# Patient Record
Sex: FEMALE | Race: BLACK OR AFRICAN AMERICAN | Employment: UNEMPLOYED | URBAN - METROPOLITAN AREA
[De-identification: names, ages, dates, MRNs, and addresses within clinical notes are randomized per-mention and may not be internally consistent; named-entity substitution may affect disease eponyms.]

---

## 2017-01-15 ENCOUNTER — HOSPITAL ENCOUNTER (EMERGENCY)
Age: 22
Discharge: HOME | End: 2017-01-15
Attending: EMERGENCY MEDICINE
Payer: COMMERCIAL

## 2017-01-15 VITALS
TEMPERATURE: 98 F | BODY MASS INDEX: 26.37 KG/M2 | DIASTOLIC BLOOD PRESSURE: 70 MMHG | OXYGEN SATURATION: 99 % | WEIGHT: 168 LBS | SYSTOLIC BLOOD PRESSURE: 132 MMHG | RESPIRATION RATE: 18 BRPM | HEIGHT: 67 IN | HEART RATE: 74 BPM

## 2017-01-15 DIAGNOSIS — Z34.90 EARLY STAGE OF PREGNANCY: Primary | ICD-10-CM

## 2017-01-15 LAB
ALBUMIN SERPL BCP-MCNC: 3.8 G/DL (ref 3.5–5)
ALBUMIN/GLOB SERPL: 1 {RATIO} (ref 1.2–3.5)
ALP SERPL-CCNC: 45 U/L (ref 50–136)
ALT SERPL-CCNC: 23 U/L (ref 12–65)
ANION GAP BLD CALC-SCNC: 11 MMOL/L (ref 7–16)
AST SERPL W P-5'-P-CCNC: 28 U/L (ref 15–37)
BASOPHILS # BLD AUTO: 0 K/UL (ref 0–0.2)
BASOPHILS # BLD: 1 % (ref 0–2)
BILIRUB SERPL-MCNC: 0.5 MG/DL (ref 0.2–1.1)
BUN SERPL-MCNC: 9 MG/DL (ref 6–23)
CALCIUM SERPL-MCNC: 8.9 MG/DL (ref 8.3–10.4)
CHLORIDE SERPL-SCNC: 105 MMOL/L (ref 98–107)
CO2 SERPL-SCNC: 23 MMOL/L (ref 21–32)
CREAT SERPL-MCNC: 0.76 MG/DL (ref 0.6–1)
DIFFERENTIAL METHOD BLD: ABNORMAL
EOSINOPHIL # BLD: 0.1 K/UL (ref 0–0.8)
EOSINOPHIL NFR BLD: 1 % (ref 0.5–7.8)
ERYTHROCYTE [DISTWIDTH] IN BLOOD BY AUTOMATED COUNT: 13.2 % (ref 11.9–14.6)
GLOBULIN SER CALC-MCNC: 3.7 G/DL (ref 2.3–3.5)
GLUCOSE SERPL-MCNC: 75 MG/DL (ref 65–100)
HCG SERPL-ACNC: ABNORMAL MIU/ML (ref 0–6)
HCG UR QL: POSITIVE
HCT VFR BLD AUTO: 33.4 % (ref 35.8–46.3)
HGB BLD-MCNC: 11.3 G/DL (ref 11.7–15.4)
IMM GRANULOCYTES # BLD: 0 K/UL (ref 0–0.5)
IMM GRANULOCYTES NFR BLD AUTO: 0 % (ref 0–5)
LYMPHOCYTES # BLD AUTO: 66 % (ref 13–44)
LYMPHOCYTES # BLD: 2.7 K/UL (ref 0.5–4.6)
MAGNESIUM SERPL-MCNC: 2.3 MG/DL (ref 1.8–2.4)
MCH RBC QN AUTO: 29.4 PG (ref 26.1–32.9)
MCHC RBC AUTO-ENTMCNC: 33.8 G/DL (ref 31.4–35)
MCV RBC AUTO: 87 FL (ref 79.6–97.8)
MONOCYTES # BLD: 0.7 K/UL (ref 0.1–1.3)
MONOCYTES NFR BLD AUTO: 18 % (ref 4–12)
NEUTS SEG # BLD: 0.6 K/UL (ref 1.7–8.2)
NEUTS SEG NFR BLD AUTO: 14 % (ref 43–78)
PLATELET # BLD AUTO: 181 K/UL (ref 150–450)
PMV BLD AUTO: 10 FL (ref 10.8–14.1)
POTASSIUM SERPL-SCNC: 3.6 MMOL/L (ref 3.5–5.1)
PROT SERPL-MCNC: 7.5 G/DL (ref 6.3–8.2)
RBC # BLD AUTO: 3.84 M/UL (ref 4.05–5.25)
SODIUM SERPL-SCNC: 139 MMOL/L (ref 136–145)
WBC # BLD AUTO: 4.1 K/UL (ref 4.3–11.1)

## 2017-01-15 PROCEDURE — 81003 URINALYSIS AUTO W/O SCOPE: CPT | Performed by: EMERGENCY MEDICINE

## 2017-01-15 PROCEDURE — 99284 EMERGENCY DEPT VISIT MOD MDM: CPT | Performed by: EMERGENCY MEDICINE

## 2017-01-15 PROCEDURE — 84702 CHORIONIC GONADOTROPIN TEST: CPT | Performed by: EMERGENCY MEDICINE

## 2017-01-15 PROCEDURE — 83735 ASSAY OF MAGNESIUM: CPT | Performed by: EMERGENCY MEDICINE

## 2017-01-15 PROCEDURE — 81025 URINE PREGNANCY TEST: CPT

## 2017-01-15 PROCEDURE — 85025 COMPLETE CBC W/AUTO DIFF WBC: CPT | Performed by: EMERGENCY MEDICINE

## 2017-01-15 PROCEDURE — 80053 COMPREHEN METABOLIC PANEL: CPT | Performed by: EMERGENCY MEDICINE

## 2017-01-15 NOTE — ED PROVIDER NOTES
HPI Comments: Pt reports to the ED reporting increasing fatigue and dizziness for the past couple of days. Denies any syncope. Reports some intermittent nausea. Recently completed amoxicillin for UTI. Denies fevers, vomiting, chest pain or dyspnea. Patient is a 19 y.o. female presenting with fatigue. The history is provided by the patient. Fatigue   This is a recurrent problem. The current episode started more than 1 week ago. The problem has not changed since onset. There was no focality noted. Pertinent negatives include no focal weakness, no slurred speech, no speech difficulty, no movement disorder, no agitation and no unresponsiveness. There has been no fever. Pertinent negatives include no shortness of breath, no vomiting, no confusion, no headaches and no nausea. Past Medical History:   Diagnosis Date    Anemia     Hypertension      gestational BP    Missed ab 2/10/15    Preeclampsia     Second trimester bleeding 8/1/2014    Subchorionic bleed 8/2/2014       Past Surgical History:   Procedure Laterality Date    Hx other surgical       D&C 2015    Hx gyn           Family History:   Problem Relation Age of Onset    Asthma Father     Diabetes Father     Bleeding Prob Brother      Brain    Diabetes Brother     Other Paternal Grandmother      Lung cancer       Social History     Social History    Marital status: SINGLE     Spouse name: N/A    Number of children: N/A    Years of education: N/A     Occupational History    Not on file. Social History Main Topics    Smoking status: Never Smoker    Smokeless tobacco: Never Used    Alcohol use No    Drug use: No    Sexual activity: Yes     Partners: Male     Other Topics Concern    Not on file     Social History Narrative         ALLERGIES: Bactrim [sulfamethoprim ds]    Review of Systems   Constitutional: Positive for fatigue. Negative for chills and diaphoresis.    HENT: Negative for congestion, dental problem, trouble swallowing and voice change. Eyes: Negative for photophobia and visual disturbance. Respiratory: Negative for cough, chest tightness and shortness of breath. Cardiovascular: Negative for palpitations and leg swelling. Gastrointestinal: Negative for abdominal pain, nausea and vomiting. Endocrine: Negative for polydipsia, polyphagia and polyuria. Genitourinary: Negative for dysuria, flank pain and urgency. Musculoskeletal: Negative for back pain and gait problem. Allergic/Immunologic: Negative for food allergies and immunocompromised state. Neurological: Negative for tremors, focal weakness, speech difficulty, weakness, numbness and headaches. Hematological: Does not bruise/bleed easily. Psychiatric/Behavioral: Negative for agitation and confusion. All other systems reviewed and are negative. Vitals:    01/15/17 1511   BP: 126/62   Pulse: 78   Resp: 16   Temp: 98.2 °F (36.8 °C)   SpO2: 100%   Weight: 76.2 kg (168 lb)   Height: 5' 7\" (1.702 m)            Physical Exam   Constitutional: She is oriented to person, place, and time. She appears well-developed and well-nourished. No distress. HENT:   Head: Normocephalic and atraumatic. Mouth/Throat: Oropharynx is clear and moist.   Eyes: Conjunctivae and EOM are normal. Pupils are equal, round, and reactive to light. No scleral icterus. Neck: Normal range of motion. Neck supple. No JVD present. No tracheal deviation present. Cardiovascular: Normal rate, regular rhythm, normal heart sounds and intact distal pulses. No murmur heard. Pulmonary/Chest: Effort normal and breath sounds normal. No respiratory distress. She has no wheezes. Abdominal: Soft. Bowel sounds are normal. She exhibits no distension. Musculoskeletal: Normal range of motion. She exhibits no edema or deformity. Neurological: She is alert and oriented to person, place, and time. She has normal reflexes. No cranial nerve deficit.  Coordination normal.   Skin: Skin is warm and dry. No rash noted. She is not diaphoretic. Nursing note and vitals reviewed. MDM  Number of Diagnoses or Management Options  Diagnosis management comments: DDx: Anemia/ UTI/ Electrolyte abnormality/ Pregnancy    5:05 PM  UPT positive, will check HCG quant    6:02 PM  HCG 20K  Bedside US showed an IUP  Labs stable, normal urinalysis, will d/c with early pregnancy precautions. Will ob f/u       Amount and/or Complexity of Data Reviewed  Clinical lab tests: ordered and reviewed    Risk of Complications, Morbidity, and/or Mortality  Presenting problems: moderate  Diagnostic procedures: low  Management options: moderate    Patient Progress  Patient progress: stable    ED Course       Bedside US  Date/Time: 1/15/2017 6:02 PM  Consent: Verbal consent obtained.   Consent given by: patient  Procedure Type: Abdominal and pelvis - unknown pregnancy  Indication: pregnancy  Images Obtained: Transabdominal  Findings: Positive IUP with fetal heart rate  Fetal Heart Rate: 120 bpm  Comments: Visualized early IUP, CRL 6w5d

## 2017-01-15 NOTE — DISCHARGE INSTRUCTIONS
Learning About Pregnancy  Your Care Instructions  Your health in the early weeks of your pregnancy is particularly important for your babys health. Take good care of yourself. Anything you do that harms your body can also harm your baby. Make sure to go to all of your doctor appointments. Regular checkups will help keep you and your baby healthy. Follow-up care is a key part of your treatment and safety. Be sure to make and go to all appointments, and call your doctor if you are having problems. Its also a good idea to know your test results and keep a list of the medicines you take. How can you care for yourself at home? Diet  · Eat a balanced diet. Make sure your diet includes plenty of beans, peas, and leafy green vegetables. · Do not skip meals or go for many hours without eating. If you are nauseated, try to eat a small, healthy snack every 2 to 3 hours. · Do not eat fish that has a high level of mercury, such as shark, swordfish, or mackerel. Do not eat more than one can of tuna each week. · Drink plenty of fluids, enough so that your urine is light yellow or clear like water. If you have kidney, heart, or liver disease and have to limit fluids, talk with your doctor before you increase the amount of fluids you drink. · Cut down on caffeine, such as coffee, tea, and cola. · Do not drink alcohol, such as beer, wine, or hard liquor. · Take a multivitamin that contains at least 400 micrograms (mcg) of folic acid to help prevent birth defects. Fortified cereal and whole wheat bread are good additional sources of folic acid. · Increase the calcium in your diet. Try to drink a quart of skim milk each day. You may also take calcium supplements and choose foods such as cheese and yogurt. Lifestyle  · Make sure you go to your follow-up appointments. · Get plenty of rest. You may be unusually tired while you are pregnant. · Get at least 30 minutes of exercise on most days of the week.  Walking is a good choice. If you have not exercised in the past, start out slowly. Take several short walks each day. · Do not smoke. If you need help quitting, talk to your doctor about stop-smoking programs. These can increase your chances of quitting for good. · Do not touch cat feces or litter boxes. Also, wash your hands after you handle raw meat, and fully cook all meat before you eat it. Wear gloves when you work in the yard or garden, and wash your hands well when you are done. Cat feces, raw or undercooked meat, and contaminated dirt can cause an infection that may harm your baby or lead to a miscarriage. · Do not use saunas or hot tubs. Raising your body temperature may harm your baby. · Avoid chemical fumes, paint fumes, or poisons. · Do not use illegal drugs or alcohol. Medicines  · Review all of your medicines with your doctor. Some of your routine medicines may need to be changed to protect your baby. · Use acetaminophen (Tylenol) to relieve minor problems, such as a mild headache or backache or a mild fever with cold symptoms. Do not use nonsteroidal anti-inflammatory drugs (NSAIDs), such as ibuprofen (Advil, Motrin) or naproxen (Aleve), unless your doctor says it is okay. · Do not take two or more pain medicines at the same time unless the doctor told you to. Many pain medicines have acetaminophen, which is Tylenol. Too much acetaminophen (Tylenol) can be harmful. · Take your medicines exactly as prescribed. Call your doctor if you think you are having a problem with your medicine. To manage morning sickness  · If you feel sick when you first wake up, try eating a small snack (such as crackers) before you get out of bed. Allow some time to digest the snack, and then get out of bed slowly. · Do not skip meals or go for long periods without eating. An empty stomach can make nausea worse. · Eat small, frequent meals instead of three large meals each day. · Drink plenty of fluids.  Sports drinks, such as Gatorade or Powerade, are good choices. · Eat foods that are high in protein but low in fat. · If you are taking iron supplements, ask your doctor if they are necessary. Iron can make nausea worse. · Avoid any smells, such as coffee, that make you feel sick. · Get lots of rest. Morning sickness may be worse when you are tired. Where can you learn more? Go to http://gini-analilia.info/. Enter G656 in the search box to learn more about \"Learning About Pregnancy. \"  Current as of: May 30, 2016  Content Version: 11.1  © 3592-8630 [employer], [employer]. Care instructions adapted under license by [employer] (which disclaims liability or warranty for this information). If you have questions about a medical condition or this instruction, always ask your healthcare professional. [address] any warranty or liability for your use of this information.

## 2017-01-15 NOTE — ED TRIAGE NOTES
C/o feeling dizzy on/off x 1week . Pt states no movement needed for dizziness to begin. . Denies any recent illness, cough. cold,n,v,d.

## 2017-02-27 PROBLEM — A59.01 TRICHOMONAL VAGINITIS DURING PREGNANCY IN FIRST TRIMESTER: Status: ACTIVE | Noted: 2017-02-27

## 2017-02-27 PROBLEM — O23.591 TRICHOMONAL VAGINITIS DURING PREGNANCY IN FIRST TRIMESTER: Status: ACTIVE | Noted: 2017-02-27

## 2017-02-27 PROCEDURE — 88142 CYTOPATH C/V THIN LAYER: CPT | Performed by: OBSTETRICS & GYNECOLOGY

## 2017-03-01 ENCOUNTER — HOSPITAL ENCOUNTER (OUTPATIENT)
Dept: LAB | Age: 22
Discharge: HOME | End: 2017-03-01

## 2017-03-02 PROBLEM — O09.299 HIGH RISK PREGNANCY DUE TO HISTORY OF PREVIOUS OBSTETRICAL PROBLEM: Status: ACTIVE | Noted: 2017-02-27

## 2017-03-24 ENCOUNTER — HOSPITAL ENCOUNTER (EMERGENCY)
Age: 22
Discharge: HOME | End: 2017-03-24
Attending: OBSTETRICS & GYNECOLOGY | Admitting: OBSTETRICS & GYNECOLOGY
Payer: COMMERCIAL

## 2017-03-24 PROCEDURE — 76815 OB US LIMITED FETUS(S): CPT

## 2017-03-24 PROCEDURE — 99281 EMR DPT VST MAYX REQ PHY/QHP: CPT

## 2017-03-24 NOTE — PROGRESS NOTES
Support given to patient in 16 week fetal demise. I have been with patient on the last 3 demises. Prayer and support given. Will continue to follow as needed. Jane Gallo M.Div.

## 2017-03-24 NOTE — PROGRESS NOTES
Pt arrived to L&D triage for decreased FHT, no HT found on doppler. Pt request spiritual care to come before Dr. Marley Hunt. Ino To called.

## 2017-03-24 NOTE — IP AVS SNAPSHOT
Current Discharge Medication List  
  
ASK your doctor about these medications Dose & Instructions Dispensing Information Comments Morning Noon Evening Bedtime  
 prenatal vit-calcium-iron-fa 29 mg iron- 1 mg Tab tablet Commonly known as:  PRENATAL PLUS Your last dose was: Your next dose is:    
   
   
 Dose:  1 Tab Take 1 Tab by mouth daily. Quantity:  30 Tab Refills:  3

## 2017-03-24 NOTE — PROGRESS NOTES
Discharge instuctions given. States understanding. Ambulate to personal auto with family at side x 2.

## 2017-03-24 NOTE — DISCHARGE INSTRUCTIONS
Go to Dr Jaqui Vance office at 0830 AM for appointment and will schedule a D&C. Sepsis: Care Instructions  Your Care Instructions  Sepsis is an infection that has spread throughout your body. It is a life-threatening condition and often causes extremely low blood pressure. This can lead to problems with many different organs. The cause of sepsis is not always clear, but it can happen as part of a long-term or sudden illness. Sometimes even a mild illness can lead to sepsis. Follow-up care is a key part of your treatment and safety. Be sure to make and go to all appointments, and call your doctor if you are having problems. Its also a good idea to know your test results and keep a list of the medicines you take. How can you care for yourself at home? · If your doctor prescribed antibiotics, take them as directed. Do not stop taking them just because you feel better. You need to take the full course of antibiotics. · Drink plenty of fluids, enough so that your urine is light yellow or clear like water. Choose water or caffeine-free clear liquids until you feel better. If you have kidney, heart, or liver disease and have to limit fluids, talk with your doctor before you increase your fluid intake. You can try rehydration drinks, such as Gatorade or Powerade. · Do not drink alcohol. · Eat a healthy diet. Include fruits, vegetables, and whole grains in your diet every day. · Walking is an easy way to get exercise. Gradually increase the amount you walk every day. Make sure your doctor knows that you are starting an exercise program.  · Do not smoke or use other tobacco products. If you need help quitting, talk to your doctor about stop-smoking programs and medicines. These can increase your chances of quitting for good. When should you call for help? Call 911 anytime you think you may need emergency care. For example, call if:  · You passed out (lost consciousness).   Call your doctor now or seek immediate medical care if:  · You have a fever or chills. · You have cool, pale, or clammy skin. · You are dizzy or lightheaded, or you feel like you may faint. · You have any new symptoms, such as a cough, pain in one part of your body, or urinary problems. Watch closely for changes in your health, and be sure to contact your doctor if:  · You do not get better as expected. Where can you learn more? Go to http://alyssa.info/.

## 2017-03-24 NOTE — ED PROVIDER NOTES
Chief Complaint:      19 y.o. female  at 16w0d  weeks gestation who is seen for fetal demise. Pt has a h/o fetal loss at 19 weeks EGA and 3 early miscarriages. Pt denies any pain or bleeding. She had a sono earlier in the pregnancy at 11w4d. Mitesh Reddy HISTORY:    History   Sexual Activity    Sexual activity: Yes    Partners: Male     Patient's last menstrual period was 2016 (approximate). Social History     Social History    Marital status: SINGLE     Spouse name: N/A    Number of children: N/A    Years of education: N/A     Occupational History    Not on file. Social History Main Topics    Smoking status: Current Every Day Smoker     Packs/day: 0.25     Years: 0.50    Smokeless tobacco: Never Used    Alcohol use No    Drug use: No    Sexual activity: Yes     Partners: Male     Other Topics Concern    Not on file     Social History Narrative       Past Surgical History:   Procedure Laterality Date    HX GYN      [facility] OTHER SURGICAL      D&C     HX OTHER SURGICAL      D&C        Past Medical History:   Diagnosis Date    Anemia     Hypertension     gestational BP    Missed ab 2/10/15    Preeclampsia     Second trimester bleeding 2014    Subchorionic bleed 2014         ROS:  A 12 point review of symptoms negative except for chief complaint as described above. PHYSICAL EXAM:  Last menstrual period 2016, unknown if currently breastfeeding. The patient appears well, alert, oriented x 3. Lungs are clear. Heart RRR, no murmurs. Abdomen soft, nontender, no guarding  No cva tenderness  No fundal tenderness  Upper ext: no edema, reflexes +2  Lower ext: no edema, neg karthik's, reflexes +2  Skin: no rashes or lesions  Mood/ Affect: appropriate  SVE: Long/closed  FHT: On abdominal ultrasound - no FCA; CRL c/w 12w4d  TOCO:      Assessment/Plan:  First trimester loss; pt asymptomatic currently. Pt d/w Dr. Tabatha Contreras.  He requests that pt f/u with Dr. Jayden Ferguson on Monday 3/27/17. Ab precautions given.

## 2017-03-24 NOTE — IP AVS SNAPSHOT
Kayce Petties 
 
 
 [address] [place]  
[phone] Patient: Hosea Painting MRN: [id number] :1995 You are allergic to the following Allergen Reactions Bactrim (Sulfamethoprim Ds) Nausea and Vomiting Recent Documentation OB Status Smoking Status Pregnant Current Every Day Smoker Emergency Contacts Name Discharge Info Relation Home Work Mobile Sinai Romero DISCHARGE CAREGIVER [3] Mother [14] [phone] About your hospitalization You were admitted on:  N/A You [place] in [place]  [facility] [place] [facility] You were discharged on:  2017 Unit phone number:  [phone] Why you were hospitalized Your primary diagnosis was:  Not on File Providers Seen During Your Hospitalizations Provider Role Specialty Primary office phone Mary Carmen Doe MD Attending Provider Obstetrics & Gynecology [phone] Your Primary Care Physician (PCP) Primary Care Physician Office Phone Office Fax Ta Gross [phone] [address] Follow-up Information Follow up With Details Comments Contact Info Mary Carmen Doe MD Go in 1 day at 0830 AM on 2017 [address]  [address] 
[phone] Your Appointments 2017  8:30 AM EDT Follow Up with Mary Carmen Doe MD  
[facility] [address]  [address]  
[phone] Current Discharge Medication List  
  
ASK your doctor about these medications Dose & Instructions Dispensing Information Comments Morning Noon Evening Bedtime  
 prenatal vit-calcium-iron-fa 29 mg iron- 1 mg Tab tablet Commonly known as:  PRENATAL PLUS Your last dose was: Your next dose is:    
   
   
 Dose:  1 Tab Take 1 Tab by mouth daily. Quantity:  30 Tab Refills:  3 Discharge Instructions Go to Dr Agata Jean Baptiste office at 0830 AM for appointment and will schedule a D&C. Sepsis: Care Instructions Your Care Instructions Sepsis is an infection that has spread throughout your body. It is a life-threatening condition and often causes extremely low blood pressure. This can lead to problems with many different organs. The cause of sepsis is not always clear, but it can happen as part of a long-term or sudden illness. Sometimes even a mild illness can lead to sepsis. Follow-up care is a key part of your treatment and safety. Be sure to make and go to all appointments, and call your doctor if you are having problems. Its also a good idea to know your test results and keep a list of the medicines you take. How can you care for yourself at home? · If your doctor prescribed antibiotics, take them as directed. Do not stop taking them just because you feel better. You need to take the full course of antibiotics. · Drink plenty of fluids, enough so that your urine is light yellow or clear like water. Choose water or caffeine-free clear liquids until you feel better. If you have kidney, heart, or liver disease and have to limit fluids, talk with your doctor before you increase your fluid intake. You can try rehydration drinks, such as Gatorade or Powerade. · Do not drink alcohol. · Eat a healthy diet. Include fruits, vegetables, and whole grains in your diet every day. · Walking is an easy way to get exercise. Gradually increase the amount you walk every day. Make sure your doctor knows that you are starting an exercise program. 
· Do not smoke or use other tobacco products. If you need help quitting, talk to your doctor about stop-smoking programs and medicines. These can increase your chances of quitting for good. When should you call for help? Call 911 anytime you think you may need emergency care. For example, call if: 
· You passed out (lost consciousness). Call your doctor now or seek immediate medical care if: 
· You have a fever or chills. · You have cool, pale, or clammy skin. · You are dizzy or lightheaded, or you feel like you may faint. · You have any new symptoms, such as a cough, pain in one part of your body, or urinary problems. Watch closely for changes in your health, and be sure to contact your doctor if: 
· You do not get better as expected. Where can you learn more? Go to http://gini-analilia.info/. Discharge Orders None Introducing [facility]! Dear Bruce Davis: Thank you for requesting a [employer] account. Our records indicate that you already have an active [employer] account. You can access your account anytime at [email or web address] Did you know that you can access your hospital and ER discharge instructions at any time in [employer]? You can also review all of your test results from your hospital stay or ER visit. Additional Information If you have questions, please visit the Frequently Asked Questions section of the [employer] website at [email or web address] Remember, [facility] is NOT to be used for urgent needs. For medical emergencies, dial 911. Now available from your iPhone and Android! General Information Please provide this summary of care documentation to your next provider. Patient Signature:  ____________________________________________________________ Date:  ____________________________________________________________  
  
Bryan Thakkar Provider Signature:  ____________________________________________________________ Date:  ____________________________________________________________

## 2017-03-27 ENCOUNTER — ANESTHESIA EVENT (OUTPATIENT)
Dept: SURGERY | Age: 22
End: 2017-03-27
Payer: COMMERCIAL

## 2017-03-27 PROBLEM — O02.1 MISSED AB: Status: ACTIVE | Noted: 2017-03-27

## 2017-03-27 RX ORDER — MIDAZOLAM HYDROCHLORIDE 1 MG/ML
2 INJECTION, SOLUTION INTRAMUSCULAR; INTRAVENOUS ONCE
Status: CANCELLED | OUTPATIENT
Start: 2017-03-27 | End: 2017-03-27

## 2017-03-27 RX ORDER — CELECOXIB 200 MG/1
200 CAPSULE ORAL
Status: CANCELLED | OUTPATIENT
Start: 2017-03-27

## 2017-03-28 ENCOUNTER — ANESTHESIA (OUTPATIENT)
Dept: SURGERY | Age: 22
End: 2017-03-28
Payer: COMMERCIAL

## 2017-03-28 ENCOUNTER — SURGERY (OUTPATIENT)
Age: 22
End: 2017-03-28

## 2017-03-28 ENCOUNTER — HOSPITAL ENCOUNTER (OUTPATIENT)
Age: 22
Setting detail: OUTPATIENT SURGERY
Discharge: HOME | End: 2017-03-28
Attending: OBSTETRICS & GYNECOLOGY | Admitting: OBSTETRICS & GYNECOLOGY
Payer: COMMERCIAL

## 2017-03-28 VITALS
DIASTOLIC BLOOD PRESSURE: 58 MMHG | RESPIRATION RATE: 16 BRPM | TEMPERATURE: 97.8 F | HEIGHT: 67 IN | OXYGEN SATURATION: 100 % | BODY MASS INDEX: 27 KG/M2 | HEART RATE: 65 BPM | SYSTOLIC BLOOD PRESSURE: 115 MMHG | WEIGHT: 172 LBS

## 2017-03-28 PROCEDURE — 76010000138 HC OR TIME 0.5 TO 1 HR: Performed by: OBSTETRICS & GYNECOLOGY

## 2017-03-28 PROCEDURE — 77030020143 HC AIRWY LARYN INTUB CGAS -A: Performed by: ANESTHESIOLOGY

## 2017-03-28 PROCEDURE — 77030008579 HC TBNG UTER SUC CARD -A: Performed by: OBSTETRICS & GYNECOLOGY

## 2017-03-28 PROCEDURE — 77030020782 HC GWN BAIR PAWS FLX 3M -B: Performed by: ANESTHESIOLOGY

## 2017-03-28 PROCEDURE — 88305 TISSUE EXAM BY PATHOLOGIST: CPT | Performed by: OBSTETRICS & GYNECOLOGY

## 2017-03-28 PROCEDURE — 74011250636 HC RX REV CODE- 250/636

## 2017-03-28 PROCEDURE — 76210000016 HC OR PH I REC 1 TO 1.5 HR: Performed by: OBSTETRICS & GYNECOLOGY

## 2017-03-28 PROCEDURE — 74011000250 HC RX REV CODE- 250

## 2017-03-28 PROCEDURE — 77030018836 HC SOL IRR NACL ICUM -A: Performed by: OBSTETRICS & GYNECOLOGY

## 2017-03-28 PROCEDURE — 74011000250 HC RX REV CODE- 250: Performed by: ANESTHESIOLOGY

## 2017-03-28 PROCEDURE — 76060000032 HC ANESTHESIA 0.5 TO 1 HR: Performed by: OBSTETRICS & GYNECOLOGY

## 2017-03-28 PROCEDURE — 77030011640 HC PAD GRND REM COVD -A: Performed by: OBSTETRICS & GYNECOLOGY

## 2017-03-28 PROCEDURE — 77030009368: Performed by: OBSTETRICS & GYNECOLOGY

## 2017-03-28 PROCEDURE — 74011250636 HC RX REV CODE- 250/636: Performed by: ANESTHESIOLOGY

## 2017-03-28 PROCEDURE — 74011250637 HC RX REV CODE- 250/637: Performed by: ANESTHESIOLOGY

## 2017-03-28 PROCEDURE — 76210000021 HC REC RM PH II 0.5 TO 1 HR: Performed by: OBSTETRICS & GYNECOLOGY

## 2017-03-28 RX ORDER — OXYCODONE HYDROCHLORIDE 5 MG/1
5 TABLET ORAL
Status: DISCONTINUED | OUTPATIENT
Start: 2017-03-28 | End: 2017-03-28 | Stop reason: HOSPADM

## 2017-03-28 RX ORDER — LIDOCAINE HYDROCHLORIDE 10 MG/ML
0.1 INJECTION INFILTRATION; PERINEURAL AS NEEDED
Status: DISCONTINUED | OUTPATIENT
Start: 2017-03-28 | End: 2017-03-28 | Stop reason: HOSPADM

## 2017-03-28 RX ORDER — ALBUTEROL SULFATE 0.83 MG/ML
2.5 SOLUTION RESPIRATORY (INHALATION) AS NEEDED
Status: DISCONTINUED | OUTPATIENT
Start: 2017-03-28 | End: 2017-03-28 | Stop reason: HOSPADM

## 2017-03-28 RX ORDER — LIDOCAINE HYDROCHLORIDE 20 MG/ML
INJECTION, SOLUTION EPIDURAL; INFILTRATION; INTRACAUDAL; PERINEURAL AS NEEDED
Status: DISCONTINUED | OUTPATIENT
Start: 2017-03-28 | End: 2017-03-28 | Stop reason: HOSPADM

## 2017-03-28 RX ORDER — DEXAMETHASONE SODIUM PHOSPHATE 4 MG/ML
INJECTION, SOLUTION INTRA-ARTICULAR; INTRALESIONAL; INTRAMUSCULAR; INTRAVENOUS; SOFT TISSUE AS NEEDED
Status: DISCONTINUED | OUTPATIENT
Start: 2017-03-28 | End: 2017-03-28 | Stop reason: HOSPADM

## 2017-03-28 RX ORDER — ONDANSETRON 2 MG/ML
INJECTION INTRAMUSCULAR; INTRAVENOUS AS NEEDED
Status: DISCONTINUED | OUTPATIENT
Start: 2017-03-28 | End: 2017-03-28 | Stop reason: HOSPADM

## 2017-03-28 RX ORDER — FENTANYL CITRATE 50 UG/ML
INJECTION, SOLUTION INTRAMUSCULAR; INTRAVENOUS AS NEEDED
Status: DISCONTINUED | OUTPATIENT
Start: 2017-03-28 | End: 2017-03-28 | Stop reason: HOSPADM

## 2017-03-28 RX ORDER — SODIUM CHLORIDE 0.9 % (FLUSH) 0.9 %
5-10 SYRINGE (ML) INJECTION AS NEEDED
Status: DISCONTINUED | OUTPATIENT
Start: 2017-03-28 | End: 2017-03-28 | Stop reason: HOSPADM

## 2017-03-28 RX ORDER — OXYCODONE HYDROCHLORIDE 5 MG/1
5 TABLET ORAL
Qty: 20 TAB | Refills: 0 | Status: SHIPPED | OUTPATIENT
Start: 2017-03-28 | End: 2018-09-06

## 2017-03-28 RX ORDER — FENTANYL CITRATE 50 UG/ML
100 INJECTION, SOLUTION INTRAMUSCULAR; INTRAVENOUS ONCE
Status: DISCONTINUED | OUTPATIENT
Start: 2017-03-28 | End: 2017-03-28 | Stop reason: HOSPADM

## 2017-03-28 RX ORDER — HYDROMORPHONE HYDROCHLORIDE 2 MG/ML
0.5 INJECTION, SOLUTION INTRAMUSCULAR; INTRAVENOUS; SUBCUTANEOUS
Status: DISCONTINUED | OUTPATIENT
Start: 2017-03-28 | End: 2017-03-28 | Stop reason: HOSPADM

## 2017-03-28 RX ORDER — MIDAZOLAM HYDROCHLORIDE 1 MG/ML
2 INJECTION, SOLUTION INTRAMUSCULAR; INTRAVENOUS
Status: DISCONTINUED | OUTPATIENT
Start: 2017-03-28 | End: 2017-03-28 | Stop reason: HOSPADM

## 2017-03-28 RX ORDER — SODIUM CHLORIDE, SODIUM LACTATE, POTASSIUM CHLORIDE, CALCIUM CHLORIDE 600; 310; 30; 20 MG/100ML; MG/100ML; MG/100ML; MG/100ML
75 INJECTION, SOLUTION INTRAVENOUS CONTINUOUS
Status: DISCONTINUED | OUTPATIENT
Start: 2017-03-28 | End: 2017-03-28 | Stop reason: HOSPADM

## 2017-03-28 RX ORDER — SODIUM CHLORIDE 0.9 % (FLUSH) 0.9 %
5-10 SYRINGE (ML) INJECTION EVERY 8 HOURS
Status: DISCONTINUED | OUTPATIENT
Start: 2017-03-28 | End: 2017-03-28 | Stop reason: HOSPADM

## 2017-03-28 RX ORDER — PROPOFOL 10 MG/ML
INJECTION, EMULSION INTRAVENOUS AS NEEDED
Status: DISCONTINUED | OUTPATIENT
Start: 2017-03-28 | End: 2017-03-28 | Stop reason: HOSPADM

## 2017-03-28 RX ORDER — IBUPROFEN 800 MG/1
800 TABLET ORAL
Qty: 30 TAB | Refills: 0 | Status: SHIPPED | OUTPATIENT
Start: 2017-03-28 | End: 2018-09-06

## 2017-03-28 RX ORDER — SODIUM CHLORIDE, SODIUM LACTATE, POTASSIUM CHLORIDE, CALCIUM CHLORIDE 600; 310; 30; 20 MG/100ML; MG/100ML; MG/100ML; MG/100ML
50 INJECTION, SOLUTION INTRAVENOUS CONTINUOUS
Status: DISCONTINUED | OUTPATIENT
Start: 2017-03-28 | End: 2017-03-28 | Stop reason: HOSPADM

## 2017-03-28 RX ADMIN — MIDAZOLAM HYDROCHLORIDE 2 MG: 1 INJECTION, SOLUTION INTRAMUSCULAR; INTRAVENOUS at 12:46

## 2017-03-28 RX ADMIN — LIDOCAINE HYDROCHLORIDE 100 MG: 20 INJECTION, SOLUTION EPIDURAL; INFILTRATION; INTRACAUDAL; PERINEURAL at 13:02

## 2017-03-28 RX ADMIN — SODIUM CHLORIDE, SODIUM LACTATE, POTASSIUM CHLORIDE, AND CALCIUM CHLORIDE 75 ML/HR: 600; 310; 30; 20 INJECTION, SOLUTION INTRAVENOUS at 12:46

## 2017-03-28 RX ADMIN — HYDROMORPHONE HYDROCHLORIDE 0.5 MG: 2 INJECTION, SOLUTION INTRAMUSCULAR; INTRAVENOUS; SUBCUTANEOUS at 13:50

## 2017-03-28 RX ADMIN — FENTANYL CITRATE 100 MCG: 50 INJECTION, SOLUTION INTRAMUSCULAR; INTRAVENOUS at 12:59

## 2017-03-28 RX ADMIN — HYDROMORPHONE HYDROCHLORIDE 0.5 MG: 2 INJECTION, SOLUTION INTRAMUSCULAR; INTRAVENOUS; SUBCUTANEOUS at 14:04

## 2017-03-28 RX ADMIN — OXYCODONE HYDROCHLORIDE 5 MG: 5 TABLET ORAL at 14:52

## 2017-03-28 RX ADMIN — ONDANSETRON 4 MG: 2 INJECTION INTRAMUSCULAR; INTRAVENOUS at 13:07

## 2017-03-28 RX ADMIN — HYDROMORPHONE HYDROCHLORIDE 0.5 MG: 2 INJECTION, SOLUTION INTRAMUSCULAR; INTRAVENOUS; SUBCUTANEOUS at 13:57

## 2017-03-28 RX ADMIN — LIDOCAINE HYDROCHLORIDE 0.1 ML: 10 INJECTION, SOLUTION INFILTRATION; PERINEURAL at 12:46

## 2017-03-28 RX ADMIN — DEXAMETHASONE SODIUM PHOSPHATE 5 MG: 4 INJECTION, SOLUTION INTRA-ARTICULAR; INTRALESIONAL; INTRAMUSCULAR; INTRAVENOUS; SOFT TISSUE at 13:07

## 2017-03-28 RX ADMIN — PROPOFOL 200 MG: 10 INJECTION, EMULSION INTRAVENOUS at 13:02

## 2017-03-28 NOTE — OP NOTES
Name: Lashell Shaw Record Number: [id number]      YOB: 1995     Today's Date: 2017      Preoperative Diagnosis: Missed  [O02.1]    Postoperative Diagnosis: Missed     Procedure:      Surgeon(s):  Cheyanne Douglas MD    Anesthesia:  general    Prophylactic Antibiotics: None    EBL: 100 ml    DRAINS: None. OPERATIVE FINDINGS: A 14 week sized, uterus. Uterus sounded to 14cm    OPERATIVE PROCEDURE: The patient was brought to the operating room and placed on the operating table. After general anesthesia was induced and adequate ventilation was established, the patient was placed in a dorsal lithotomy position, prepped and draped in a sterile fashion. Exam under anesthesia performed, confirming an 14 week sized,  uterus with no distinct adnexal mass. Speculum was inserted into the vaginal vault. The cervix was visualized. Anterior cervical lip was grasped with a tenaculum. Endometrial cavity was sounded to 14 cm. The endocervical canal was then dilated with serial dilators. A 12 mm suction curette was then used to evacuate the uterine cavity of products of conception. large  amount of tissue was obtained and was sent for pathologic evaluation. Instruments were then removed from the cervix and vagina. Instrument, and sponge counts were reported to be correct at the end of the procedure. The patient seemed to tolerate the procedure well and was transferred to  the recovery room in good, stable condition.     Cheyanne Douglas MD

## 2017-03-28 NOTE — IP AVS SNAPSHOT
Current Discharge Medication List  
  
START taking these medications Dose & Instructions Dispensing Information Comments Morning Noon Evening Bedtime  
 ibuprofen 800 mg tablet Commonly known as:  MOTRIN IB Your last dose was: Your next dose is:    
   
   
 Dose:  800 mg Take 1 Tab by mouth every six (6) hours as needed for Pain. Quantity:  30 Tab Refills:  0  
     
   
   
   
  
 oxyCODONE IR 5 mg immediate release tablet Commonly known as:  Chinyere Arreguin Your last dose was: Your next dose is:    
   
   
 Dose:  5 mg Take 1 Tab by mouth every four (4) hours as needed. Max Daily Amount: 30 mg.  
 Quantity:  20 Tab Refills:  0 ASK your doctor about these medications Dose & Instructions Dispensing Information Comments Morning Noon Evening Bedtime  
 prenatal vit-calcium-iron-fa 29 mg iron- 1 mg Tab tablet Commonly known as:  PRENATAL PLUS Your last dose was: Your next dose is:    
   
   
 Dose:  1 Tab Take 1 Tab by mouth daily. Quantity:  30 Tab Refills:  3 Where to Get Your Medications These medications were sent to [address] [place] [address] [address] [address] Hours:  24-hours Phone:  [phone]  
  ibuprofen 800 mg tablet Information on where to get these meds will be given to you by the nurse or doctor. ! Ask your nurse or doctor about these medications  
  oxyCODONE IR 5 mg immediate release tablet

## 2017-03-28 NOTE — IP AVS SNAPSHOT
Christopher Brown 
 
 
 [address] [place] [address] 
[phone] Patient: Hugo Colón MRN: [id number] :1995 You are allergic to the following Allergen Reactions Bactrim (Sulfamethoprim Ds) Nausea and Vomiting Recent Documentation Height Weight BMI OB Status Smoking Status 1.702 m 78 kg 26.94 kg/m2 Pregnant Current Every Day Smoker Emergency Contacts Name Discharge Info Relation Home Work Mobile S[facility] Romero DISCHARGE CAREGIVER [3] Mother [14]   [phone] About your hospitalization You were admitted on:  2017 You last received care in the:  [facility] PACU You were discharged on:  2017 Unit phone number:  [phone] Why you were hospitalized Your primary diagnosis was:  Missed Ab Providers Seen During Your Hospitalizations Provider Role Specialty Primary office phone Eugenia Paul MD Attending Provider Obstetrics & Gynecology [phone] Your Primary Care Physician (PCP) Primary Care Physician Office Phone Office Fax Bhumi  [phone] Chavez Follow-up Information Follow up With Details Comments Contact Info Eugenia Paul MD Call today call for follow up appt [address]  [address] 
[phone] Claude Stevens MD   [address] [place] [address] 
[phone] Your Appointments 2017 11:30 AM EDT Global Post Op with Eugenia Paul MD  
[facility]) [address]  [address]  
[phone] Current Discharge Medication List  
  
START taking these medications Dose & Instructions Dispensing Information Comments Morning Noon Evening Bedtime  
 ibuprofen 800 mg tablet Commonly known as:  MOTRIN IB  
   
 Your last dose was: Your next dose is:    
   
   
 Dose:  800 mg Take 1 Tab by mouth every six (6) hours as needed for Pain. Quantity:  30 Tab Refills:  0  
     
   
   
   
  
 oxyCODONE IR 5 mg immediate release tablet Commonly known as:  Ortiz Alvares Your last dose was: Your next dose is:    
   
   
 Dose:  5 mg Take 1 Tab by mouth every four (4) hours as needed. Max Daily Amount: 30 mg.  
 Quantity:  20 Tab Refills:  0 ASK your doctor about these medications Dose & Instructions Dispensing Information Comments Morning Noon Evening Bedtime  
 prenatal vit-calcium-iron-fa 29 mg iron- 1 mg Tab tablet Commonly known as:  PRENATAL PLUS Your last dose was: Your next dose is:    
   
   
 Dose:  1 Tab Take 1 Tab by mouth daily. Quantity:  30 Tab Refills:  3 Where to Get Your Medications These medications were sent to [address] [place] [address] Way [address] Hours:  24-hours Phone:  [phone]  
  ibuprofen 800 mg tablet Information on where to get these meds will be given to you by the nurse or doctor. ! Ask your nurse or doctor about these medications  
  oxyCODONE IR 5 mg immediate release tablet Discharge Instructions Dilation and Curettage: What to Expect at [facility] Your Recovery Dilation and curettage (D&C) is a procedure to remove tissue from the inside of the uterus. The doctor used a curved tool, called a curette, to gently scrape tissue from your uterus. You are likely to have a backache, or cramps similar to menstrual cramps, and pass small clots of blood from your vagina for the first few days. You may continue to have light vaginal bleeding for several weeks after the procedure.  
You will probably be able to go back to most of your normal activities in 1 or 2 days. This care sheet gives you a general idea about how long it will take for you to recover. But each person recovers at a different pace. Follow the steps below to get better as quickly as possible. How can you care for yourself at home? Activity · Rest when you feel tired. Getting enough sleep will help you recover. · Avoid strenuous activities, such as bicycle riding, jogging, weight lifting, or aerobic exercise, until your doctor says it is okay. · Most women are able to return to work the day after the procedure. · You may have some light vaginal bleeding. Wear sanitary pads if needed. Do not douche or use tampons for 2 weeks or until your doctor says it is okay. · Ask your doctor when it is okay for you to have sex. · If you could become pregnant, talk about birth control with your doctor. Do not try to become pregnant until your doctor says it is okay. Diet · You can eat your normal diet. If your stomach is upset, try bland, low-fat foods like plain rice, broiled chicken, toast, and yogurt. · Drink plenty of fluids (unless your doctor tells you not to). Medicines · Your doctor will tell you if and when you can restart your medicines. He or she will also give you instructions about taking any new medicines. · If you take blood thinners, such as warfarin (Coumadin), clopidogrel (Plavix), or aspirin, be sure to talk to your doctor. He or she will tell you if and when to start taking those medicines again. Make sure that you understand exactly what your doctor wants you to do. · Be safe with medicines. Take pain medicines exactly as directed. ¨ If the doctor gave you a prescription medicine for pain, take it as prescribed. ¨ If you are not taking a prescription pain medicine, ask your doctor if you can take an over-the-counter medicine. · If you think your pain medicine is making you sick to your stomach: 
¨ Take your medicine after meals (unless your doctor has told you not to). ¨ Ask your doctor for a different pain medicine. · If your doctor prescribed antibiotics, take them as directed. Do not stop taking them just because you feel better. You need to take the full course of antibiotics. Follow-up care is a key part of your treatment and safety. Be sure to make and go to all appointments, and call your doctor if you are having problems. It's also a good idea to know your test results and keep a list of the medicines you take. When should you call for help? Call 911 anytime you think you may need emergency care. For example, call if: 
· You passed out (lost consciousness). · You have severe trouble breathing. · You have chest pain and shortness of breath, or you cough up blood. · You have severe pain in your belly. Call your doctor now or seek immediate medical care if: 
· You have bright red vaginal bleeding that soaks one or more pads each hour for 2 or more hours. · You pass blood clots that are larger than a golf ball. · You have vaginal discharge that smells bad. · You are sick to your stomach or cannot keep fluids down. · You have pain that does not get better after you take pain medicine. · You have pain that is getting worse 2 days after the procedure. · You have a fever over 100°F. 
· Your belly feels tender, or full and hard. Watch closely for changes in your health, and be sure to contact your doctor if: 
· You do not get better as expected. Where can you learn more? Go to http://gini-analilia.info/. Enter [phone] in the search box to learn more about \"Dilation and Curettage: What to Expect at Home. \" Current as of: May 30, 2016 Content Version: 11.1 © 0742-1543 [employer]wise, [employer]. Care instructions adapted under license by [employer] (which disclaims liability or warranty for this information).  If you have questions about a medical condition or this instruction, always ask your healthcare professional. [employer] disclaims any warranty or liability for your use of this information. Discharge Orders None Introducing [facility]! Dear Bryan Kumar: Thank you for requesting a [employer] account. Our records indicate that you already have an active [employer] account. You can access your account anytime at https://[employer]. [employer] Did you know that you can access your hospital and ER discharge instructions at any time in [employer]? You can also review all of your test results from your hospital stay or ER visit. Additional Information If you have questions, please visit the Frequently Asked Questions section of the [employer] website at https://[employer]. [employer]/. Remember, [employer] is NOT to be used for urgent needs. For medical emergencies, dial 911. Now available from your iPhone and Android! General Information Please provide this summary of care documentation to your next provider. Patient Signature:  ____________________________________________________________ Date:  ____________________________________________________________  
  
Earnstine Mane Provider Signature:  ____________________________________________________________ Date:  ____________________________________________________________

## 2017-03-28 NOTE — DISCHARGE INSTRUCTIONS
Dilation and Curettage: What to Expect at Home  Your Recovery  Dilation and curettage (D&C) is a procedure to remove tissue from the inside of the uterus. The doctor used a curved tool, called a curette, to gently scrape tissue from your uterus. You are likely to have a backache, or cramps similar to menstrual cramps, and pass small clots of blood from your vagina for the first few days. You may continue to have light vaginal bleeding for several weeks after the procedure. You will probably be able to go back to most of your normal activities in 1 or 2 days. This care sheet gives you a general idea about how long it will take for you to recover. But each person recovers at a different pace. Follow the steps below to get better as quickly as possible. How can you care for yourself at home? Activity  · Rest when you feel tired. Getting enough sleep will help you recover. · Avoid strenuous activities, such as bicycle riding, jogging, weight lifting, or aerobic exercise, until your doctor says it is okay. · Most women are able to return to work the day after the procedure. · You may have some light vaginal bleeding. Wear sanitary pads if needed. Do not douche or use tampons for 2 weeks or until your doctor says it is okay. · Ask your doctor when it is okay for you to have sex. · If you could become pregnant, talk about birth control with your doctor. Do not try to become pregnant until your doctor says it is okay. Diet  · You can eat your normal diet. If your stomach is upset, try bland, low-fat foods like plain rice, broiled chicken, toast, and yogurt. · Drink plenty of fluids (unless your doctor tells you not to). Medicines  · Your doctor will tell you if and when you can restart your medicines. He or she will also give you instructions about taking any new medicines. · If you take blood thinners, such as warfarin (Coumadin), clopidogrel (Plavix), or aspirin, be sure to talk to your doctor.  He or she will tell you if and when to start taking those medicines again. Make sure that you understand exactly what your doctor wants you to do. · Be safe with medicines. Take pain medicines exactly as directed. ¨ If the doctor gave you a prescription medicine for pain, take it as prescribed. ¨ If you are not taking a prescription pain medicine, ask your doctor if you can take an over-the-counter medicine. · If you think your pain medicine is making you sick to your stomach:  ¨ Take your medicine after meals (unless your doctor has told you not to). ¨ Ask your doctor for a different pain medicine. · If your doctor prescribed antibiotics, take them as directed. Do not stop taking them just because you feel better. You need to take the full course of antibiotics. Follow-up care is a key part of your treatment and safety. Be sure to make and go to all appointments, and call your doctor if you are having problems. It's also a good idea to know your test results and keep a list of the medicines you take. When should you call for help? Call 911 anytime you think you may need emergency care. For example, call if:  · You passed out (lost consciousness). · You have severe trouble breathing. · You have chest pain and shortness of breath, or you cough up blood. · You have severe pain in your belly. Call your doctor now or seek immediate medical care if:  · You have bright red vaginal bleeding that soaks one or more pads each hour for 2 or more hours. · You pass blood clots that are larger than a golf ball. · You have vaginal discharge that smells bad. · You are sick to your stomach or cannot keep fluids down. · You have pain that does not get better after you take pain medicine. · You have pain that is getting worse 2 days after the procedure. · You have a fever over 100°F.  · Your belly feels tender, or full and hard.   Watch closely for changes in your health, and be sure to contact your doctor if:  · You do not get better as expected. Where can you learn more? Go to http://gini-analilia.info/. Enter [phone] in the search box to learn more about \"Dilation and Curettage: What to Expect at Home. \"  Current as of: May 30, 2016  Content Version: 11.1  © 4850-1976 [employer]. Care instructions adapted under license by [employer] (which disclaims liability or warranty for this information). If you have questions about a medical condition or this instruction, always ask your healthcare professional. [address] any warranty or liability for your use of this information.

## 2017-03-28 NOTE — ANESTHESIA POSTPROCEDURE EVALUATION
Post-Anesthesia Evaluation and Assessment    Patient: Luana Perez MRN: [id number]  SSN: [id number]    YOB: 1995  Age: 19 y.o. Sex: female       Cardiovascular Function/Vital Signs  Visit Vitals    /59    Pulse 74    Temp 36.6 °C (97.8 °F)    Resp 16    Ht 5' 7\" (1.702 m)    Wt 78 kg (172 lb)    SpO2 100%    BMI 26.94 kg/m2       Patient is status post general anesthesia for Procedure(s):  DILATATION AND CURETTAGE WITH SUCTION   . Nausea/Vomiting: None    Postoperative hydration reviewed and adequate. Pain:  Pain Scale 1: Numeric (0 - 10) (03/28/17 1405)  Pain Intensity 1: 7 (03/28/17 1405)   Managed    Neurological Status:   Neuro (WDL): Exceptions to WDL (03/28/17 1335)  Neuro  Neurologic State: Drowsy (03/28/17 1335)  Orientation Level: Oriented to person (03/28/17 1335)   At baseline    Mental Status and Level of Consciousness: Arousable    Pulmonary Status:   O2 Device: Nasal cannula (03/28/17 1405)   Adequate oxygenation and airway patent    Complications related to anesthesia: None    Post-anesthesia assessment completed.  No concerns    Signed By: Agata Cain MD     March 28, 2017

## 2017-03-28 NOTE — PROGRESS NOTES
Support given to patient in 16 week demise. \"[employer]\"  bereavement materials and [facility] support group information given along with contact information. Patient decided on [employer]. Forms filled out. Will continue to support patient as needed. Carlos Mayo M.Div.

## 2017-03-28 NOTE — H&P
Gynecology History and Physical    Name: Beatriz Lema MRN: [id number] SSN: [id number]    YOB: 1995  Age: 19 y.o. Sex: female       Subjective:      Chief complaint:  Missed Isabel Annette is a 19 y.o.  female with a history of missed AB. She is admitted for   DILATATION AND CURETTAGE WITH SUCTION 12 WKS 3 DAYS/ BLOOD TYPE A+   (N/A). OB History      Para Term  AB TAB SAB Ectopic Multiple Living    5 2 1  2  2   1        Past Medical History:   Diagnosis Date    Anemia     Hypertension     gestational BP    Missed ab 2/10/15    Preeclampsia     Second trimester bleeding 2014    Subchorionic bleed 2014     Past Surgical History:   Procedure Laterality Date    HX GYN      HX OTHER SURGICAL      D&C     HX OTHER SURGICAL      D&C      Social History     Occupational History    Not on file. Social History Main Topics    Smoking status: Current Every Day Smoker     Packs/day: 0.25     Years: 0.50    Smokeless tobacco: Never Used    Alcohol use No    Drug use: No    Sexual activity: Yes     Partners: Male     Family History   Problem Relation Age of Onset    Asthma Father     Diabetes Father     Bleeding Prob Brother      Brain    Diabetes Brother     Other Brother      brain tumor    Other Paternal Grandmother      Lung cancer    Other Mother      cervical cancer    Other Maternal Grandmother      cervical cancer        Allergies   Allergen Reactions    Bactrim [Sulfamethoprim Ds] Nausea and Vomiting     Prior to Admission medications    Medication Sig Start Date End Date Taking? Authorizing Provider   prenatal vit-calcium-iron-fa (PRENATAL PLUS) 29 mg iron- 1 mg tab tablet Take 1 Tab by mouth daily. 1/15/17  Yes Fidel Quiñonez MD        Review of Systems:  Pertinent items are noted in the History of Present Illness.      Objective:     Vitals:    17 1105   BP: 130/72   Pulse: 70   Resp: 16   Temp: 98.8 °F (37.1 °C) SpO2: 98%   Weight: 172 lb (78 kg)   Height: 5' 7\" (1.702 m)       Physical Exam:  Patient without distress. Heart: Regular rate and rhythm  Lung: clear to auscultation throughout lung fields, no wheezes, no rales, no rhonchi and normal respiratory effort  Back: costovertebral angle tenderness absent  Abdomen: soft, nontender  External Genitalia: normal general appearance  Urinary system: urethral meatus normal  Vagina: normal mucosa without prolapse or lesions  Cervix: normal appearance  Adnexa: normal bimanual exam  Uterus: normal single, nontender    Assessment:     Principal Problem:    Missed ab (3/27/2017)        Plan:     Procedure(s) (LRB):  DILATATION AND CURETTAGE WITH SUCTION 16 WKS 3 DAYS/ BLOOD TYPE A+   (N/A)  Discussed the risks of surgery including the risks of bleeding, infection, deep vein thrombosis, and surgical injuries to internal organs including but not limited to the bowels, bladder, rectum, and female reproductive organs. The patient understands the risks; any and all questions were answered to the patient's satisfaction.     Signed By:  Raquel Collet, MD     March 28, 2017

## 2017-03-28 NOTE — ANESTHESIA PREPROCEDURE EVALUATION
Anesthetic History   No history of anesthetic complications            Review of Systems / Medical History  Patient summary reviewed, nursing notes reviewed and pertinent labs reviewed    Pulmonary          Smoker         Neuro/Psych   Within defined limits           Cardiovascular  Within defined limits                Exercise tolerance: >4 METS     GI/Hepatic/Renal  Within defined limits              Endo/Other  Within defined limits           Other Findings              Physical Exam    Airway  Mallampati: II    Neck ROM: normal range of motion   Mouth opening: Normal     Cardiovascular  Regular rate and rhythm,  S1 and S2 normal,  no murmur, click, rub, or gallop             Dental  No notable dental hx       Pulmonary  Breath sounds clear to auscultation               Abdominal         Other Findings            Anesthetic Plan    ASA: 2  Anesthesia type: general          Induction: Intravenous  Anesthetic plan and risks discussed with: Patient and Sibling

## 2018-09-06 PROBLEM — Z34.80 PRENATAL CARE OF MULTIGRAVIDA, ANTEPARTUM: Status: ACTIVE | Noted: 2018-09-06

## 2020-02-18 PROBLEM — R01.1 HEART MURMUR: Status: ACTIVE | Noted: 2018-03-22

## 2020-02-18 PROBLEM — O02.1 MISSED AB: Status: RESOLVED | Noted: 2017-03-27 | Resolved: 2020-02-18

## 2020-02-18 PROBLEM — A60.00 GENITAL HERPES SIMPLEX: Status: ACTIVE | Noted: 2018-03-22

## 2020-02-18 PROBLEM — O09.91 SUPERVISION OF HIGH RISK PREGNANCY, ANTEPARTUM, FIRST TRIMESTER: Status: ACTIVE | Noted: 2020-02-18

## 2020-03-09 PROBLEM — O28.5 ABNORMAL FETAL CHROMOSOMAL ANALYSIS AFFECTING ANTEPARTUM CARE OF MOTHER: Status: ACTIVE | Noted: 2020-03-09

## 2020-03-13 PROBLEM — O09.299 HX OF PREECLAMPSIA, PRIOR PREGNANCY, CURRENTLY PREGNANT: Status: ACTIVE | Noted: 2020-03-13

## 2020-03-17 ENCOUNTER — ANESTHESIA EVENT (OUTPATIENT)
Dept: SURGERY | Age: 25
End: 2020-03-17
Payer: MEDICAID

## 2020-03-17 ENCOUNTER — HOSPITAL ENCOUNTER (OUTPATIENT)
Dept: SURGERY | Age: 25
Discharge: HOME | End: 2020-03-17

## 2020-03-17 RX ORDER — FENTANYL CITRATE 50 UG/ML
25 INJECTION, SOLUTION INTRAMUSCULAR; INTRAVENOUS ONCE
Status: CANCELLED | OUTPATIENT
Start: 2020-03-17 | End: 2020-03-17

## 2020-03-17 RX ORDER — MIDAZOLAM HYDROCHLORIDE 1 MG/ML
2 INJECTION, SOLUTION INTRAMUSCULAR; INTRAVENOUS ONCE
Status: CANCELLED | OUTPATIENT
Start: 2020-03-17 | End: 2020-03-17

## 2020-03-17 RX ORDER — MISOPROSTOL 200 UG/1
600 TABLET ORAL ONCE
Status: CANCELLED | OUTPATIENT
Start: 2020-03-17 | End: 2020-03-17

## 2020-03-17 RX ORDER — SODIUM CHLORIDE 0.9 % (FLUSH) 0.9 %
5-40 SYRINGE (ML) INJECTION EVERY 8 HOURS
Status: CANCELLED | OUTPATIENT
Start: 2020-03-17

## 2020-03-17 RX ORDER — METHYLERGONOVINE MALEATE 0.2 MG/ML
0.2 INJECTION INTRAVENOUS ONCE
Status: CANCELLED | OUTPATIENT
Start: 2020-03-17 | End: 2020-03-17

## 2020-03-17 RX ORDER — SODIUM CHLORIDE 0.9 % (FLUSH) 0.9 %
5-40 SYRINGE (ML) INJECTION AS NEEDED
Status: CANCELLED | OUTPATIENT
Start: 2020-03-17

## 2020-03-17 RX ORDER — DEXTROSE, SODIUM CHLORIDE, SODIUM LACTATE, POTASSIUM CHLORIDE, AND CALCIUM CHLORIDE 5; .6; .31; .03; .02 G/100ML; G/100ML; G/100ML; G/100ML; G/100ML
150 INJECTION, SOLUTION INTRAVENOUS CONTINUOUS
Status: CANCELLED | OUTPATIENT
Start: 2020-03-17 | End: 2020-03-18

## 2020-03-17 RX ORDER — SODIUM CHLORIDE 9 MG/ML
50 INJECTION, SOLUTION INTRAVENOUS CONTINUOUS
Status: CANCELLED | OUTPATIENT
Start: 2020-03-17 | End: 2020-03-18

## 2020-03-17 NOTE — H&P
[employer] [facility] H&P      Assessment/Plan    Problem List  Date Reviewed: 3/17/2020          Codes Class    Hx of preeclampsia, prior pregnancy, currently pregnant ICD-10-CM: O09.299  ICD-9-CM: V23.49     Overview Signed 3/13/2020  9:02 AM by Ned Dykes RN         See High Risk Pregnancy Overview             Abnormal fetal chromosomal analysis affecting antepartum care of mother ICD-10-CM: O28.5  ICD-9-CM: 655.13     Overview Addendum 3/13/2020  9:02 AM by Ned Dykes RN     Positive Materna 21 for increased risk for trisomy 18    See High Risk Pregnancy Overview             High-risk pregnancy in first trimester ICD-10-CM: O09.91  ICD-9-CM: V23.9     Overview Signed 2/18/2020  9:40 AM by Shalini Pagan,      Recurrent AB  2nd Trim - at 16 weeks  Pre-eclampsia at 30 weeks requiring delivery             Heart murmur ICD-10-CM: R01.1  ICD-9-CM: 785.2         Missed ab ICD-10-CM: O02.1  ICD-9-CM: 912     Overview Addendum 3/17/2020 10:09 AM by Juanito Wilkerson MD     3/17/2020 at [facility]- 10 week IUFD noted today on US, had FCA earlier in pregnancy. No bleeding or cramping. Explained findings, patient understands. Called Dr. Sherly Thurman to see and arrange D/C versus Cytotec. · Needs APA work up while in hospital.  Please draw full APA workup in your office at next visit (we have included the test codes for [facility]):  ACL IgG/IgM  (825598)  LA  (743147)  NATALIE  (100384)  Beta2 Glycoprotein IgG/IgM  (970097)  Leiden Factor V  (095531)  Prothrombin DNA analysis  (790047)    3/17/2020 at [facility]:  D/W pt at length findings and treatment options: obs vs. outpt cytotec vs. D&C and risks and benefits for all, including but not limited to: death, bleeding, infection, perforation need for emergent intervention, post-op complications from [facility]  and possible further effects on fertility. Pt prefers to proceed with suction D&C.                  Trichomonal vaginitis during pregnancy in first trimester ICD-10-CM: O23.591, A59.01  ICD-9-CM: 819.47, 131.01               Subjective    Sean Hilton 30 y.o. presents today for surgical evaluation/treatment of the condition noted above. She is without any new complaints or issues.     OB History    Para Term  AB Living   6 3 1 0 2 2   SAB TAB Ectopic Molar Multiple Live Births   2 0 0 0 0 2      # Outcome Date GA Lbr Timoteo/2nd Weight Sex Delivery Anes PTL Lv   6 Current            5 Para  30w0d   F Vag-Spont  N ZAYRA      Complications: Pre-eclampsia   4 SAB               Birth Comments: D&C required   3 SAB 2015              Birth Comments: D&C required   2 Para 14 19w0d  1.8 oz (0.05 kg) U VAGINAL DELI None N FD   1 Term 2010 39w0d  6 lb 7 oz (2.92 kg) M Vag-Spont EPIDURAL AN N ZAYRA       Past Medical History:   Diagnosis Date    Anemia     Genital herpes simplex 3/22/2018    Hypertension     gestational BP    Missed ab 2/10/15    Preeclampsia     Second trimester bleeding 2014    Subchorionic bleed 2014       Past Surgical History:   Procedure Laterality Date    HX GYN      HX OTHER SURGICAL      D&C     HX OTHER SURGICAL      D&C        Family History   Problem Relation Age of Onset    Asthma Father     Diabetes Father     Bleeding Prob Brother         Brain    Diabetes Brother     Other Brother         brain tumor    Other Paternal Grandmother         Lung cancer    Other Mother         cervical cancer    Other Maternal Grandmother         cervical cancer       Social History     Socioeconomic History    Marital status: SINGLE     Spouse name: Not on file    Number of children: Not on file    Years of education: Not on file    Highest education level: Not on file   Occupational History    Not on file   Social Needs    Financial resource strain: Not on file    Food insecurity     Worry: Not on file     Inability: Not on file    Transportation needs     Medical: Not on file     Non-medical: Not on file   Tobacco Use    Smoking status: Former Smoker     Packs/day: 0.25     Years: 0.50     Pack years: 0.12    Smokeless tobacco: Never Used   Substance and Sexual Activity    Alcohol use: No    Drug use: No    Sexual activity: Yes     Partners: Male   Lifestyle    Physical activity     Days per week: Not on file     Minutes per session: Not on file    Stress: Not on file   Relationships    Social connections     Talks on phone: Not on file     Gets together: Not on file     Attends Voodoo service: Not on file     Active member of club or organization: Not on file     Attends meetings of clubs or organizations: Not on file     Relationship status: Not on file    Intimate partner violence     Fear of current or ex partner: Not on file     Emotionally abused: Not on file     Physically abused: Not on file     Forced sexual activity: Not on file   Other Topics Concern    Not on file   Social History Narrative    Not on file       Allergies   Allergen Reactions    Bactrim [Sulfamethoprim Ds] Nausea and Vomiting         Review of Systems:    Constitutional: No fevers or chills     CV: No chest pain or palpatations    Resp: No SOB or cough    GI: No nausea/vomiting/diarrhea/constipation    Neuro: No HA, no seizure like activity    Skin: No rashes or lesions     : No dysuria or hematuria        Objective    Visit Vitals  LMP 12/18/2019 (LMP Unknown)         Physical Exam    Gen: alert and cooperative, NAD    HEENT: NCAT    CV: RRR    Resp: CTA bilat    Abd: soft, NT, NABS    EXT: trace edema bilat    Gyn: done as per prev office visit    Neuro: No focal deficits    Skin: No noted rashes or lesions       Shyann Mata MD  10:26 AM  03/17/20

## 2020-03-18 ENCOUNTER — ANESTHESIA (OUTPATIENT)
Dept: SURGERY | Age: 25
End: 2020-03-18
Payer: MEDICAID

## 2020-03-18 ENCOUNTER — HOSPITAL ENCOUNTER (OUTPATIENT)
Age: 25
Discharge: HOME | End: 2020-03-18
Attending: OBSTETRICS & GYNECOLOGY | Admitting: OBSTETRICS & GYNECOLOGY
Payer: MEDICAID

## 2020-03-18 VITALS
SYSTOLIC BLOOD PRESSURE: 121 MMHG | OXYGEN SATURATION: 99 % | WEIGHT: 198 LBS | DIASTOLIC BLOOD PRESSURE: 61 MMHG | BODY MASS INDEX: 31.08 KG/M2 | HEIGHT: 67 IN | RESPIRATION RATE: 16 BRPM | TEMPERATURE: 98.6 F | HEART RATE: 77 BPM

## 2020-03-18 DIAGNOSIS — O02.1 MISSED AB: Primary | ICD-10-CM

## 2020-03-18 PROCEDURE — 76060000031 HC ANESTHESIA FIRST 0.5 HR: Performed by: OBSTETRICS & GYNECOLOGY

## 2020-03-18 PROCEDURE — 76210000006 HC OR PH I REC 0.5 TO 1 HR: Performed by: OBSTETRICS & GYNECOLOGY

## 2020-03-18 PROCEDURE — 74011250636 HC RX REV CODE- 250/636: Performed by: NURSE ANESTHETIST, CERTIFIED REGISTERED

## 2020-03-18 PROCEDURE — 74011250636 HC RX REV CODE- 250/636: Performed by: ANESTHESIOLOGY

## 2020-03-18 PROCEDURE — 77030018836 HC SOL IRR NACL ICUM -A: Performed by: OBSTETRICS & GYNECOLOGY

## 2020-03-18 PROCEDURE — 74011250637 HC RX REV CODE- 250/637: Performed by: ANESTHESIOLOGY

## 2020-03-18 PROCEDURE — 74011000250 HC RX REV CODE- 250: Performed by: NURSE ANESTHETIST, CERTIFIED REGISTERED

## 2020-03-18 PROCEDURE — 77030010509 HC AIRWY LMA MSK TELE -A: Performed by: NURSE ANESTHETIST, CERTIFIED REGISTERED

## 2020-03-18 PROCEDURE — 77030008579 HC TBNG UTER SUC CARD -A: Performed by: OBSTETRICS & GYNECOLOGY

## 2020-03-18 PROCEDURE — 74011250636 HC RX REV CODE- 250/636: Performed by: OBSTETRICS & GYNECOLOGY

## 2020-03-18 PROCEDURE — 74011000258 HC RX REV CODE- 258: Performed by: OBSTETRICS & GYNECOLOGY

## 2020-03-18 PROCEDURE — 88305 TISSUE EXAM BY PATHOLOGIST: CPT

## 2020-03-18 PROCEDURE — 74011250637 HC RX REV CODE- 250/637: Performed by: OBSTETRICS & GYNECOLOGY

## 2020-03-18 PROCEDURE — 59820 CARE OF MISCARRIAGE: CPT | Performed by: OBSTETRICS & GYNECOLOGY

## 2020-03-18 PROCEDURE — 76210000021 HC REC RM PH II 0.5 TO 1 HR: Performed by: OBSTETRICS & GYNECOLOGY

## 2020-03-18 PROCEDURE — 77030012317 HC CATH URET INT COVD -A: Performed by: OBSTETRICS & GYNECOLOGY

## 2020-03-18 PROCEDURE — 74011000250 HC RX REV CODE- 250: Performed by: ANESTHESIOLOGY

## 2020-03-18 PROCEDURE — 77030009368: Performed by: OBSTETRICS & GYNECOLOGY

## 2020-03-18 PROCEDURE — 76010000154 HC OR TIME FIRST 0.5 HR: Performed by: OBSTETRICS & GYNECOLOGY

## 2020-03-18 RX ORDER — FAMOTIDINE 20 MG/1
20 TABLET, FILM COATED ORAL ONCE
Status: COMPLETED | OUTPATIENT
Start: 2020-03-18 | End: 2020-03-18

## 2020-03-18 RX ORDER — OXYCODONE HYDROCHLORIDE 5 MG/1
5 TABLET ORAL
Status: COMPLETED | OUTPATIENT
Start: 2020-03-18 | End: 2020-03-18

## 2020-03-18 RX ORDER — IBUPROFEN 600 MG/1
600 TABLET ORAL
Qty: 60 TAB | Refills: 1 | Status: SHIPPED | OUTPATIENT
Start: 2020-03-18 | End: 2020-10-26 | Stop reason: ALTCHOICE

## 2020-03-18 RX ORDER — SODIUM CHLORIDE, SODIUM LACTATE, POTASSIUM CHLORIDE, CALCIUM CHLORIDE 600; 310; 30; 20 MG/100ML; MG/100ML; MG/100ML; MG/100ML
75 INJECTION, SOLUTION INTRAVENOUS CONTINUOUS
Status: DISCONTINUED | OUTPATIENT
Start: 2020-03-18 | End: 2020-03-18 | Stop reason: HOSPADM

## 2020-03-18 RX ORDER — SODIUM CHLORIDE 0.9 % (FLUSH) 0.9 %
5-40 SYRINGE (ML) INJECTION EVERY 8 HOURS
Status: DISCONTINUED | OUTPATIENT
Start: 2020-03-18 | End: 2020-03-18 | Stop reason: HOSPADM

## 2020-03-18 RX ORDER — SODIUM CHLORIDE 0.9 % (FLUSH) 0.9 %
5-40 SYRINGE (ML) INJECTION AS NEEDED
Status: DISCONTINUED | OUTPATIENT
Start: 2020-03-18 | End: 2020-03-18 | Stop reason: HOSPADM

## 2020-03-18 RX ORDER — HYDROCODONE BITARTRATE AND ACETAMINOPHEN 5; 325 MG/1; MG/1
1 TABLET ORAL
Qty: 18 TAB | Refills: 0 | Status: SHIPPED | OUTPATIENT
Start: 2020-03-18 | End: 2020-03-23

## 2020-03-18 RX ORDER — LIDOCAINE HYDROCHLORIDE 10 MG/ML
0.1 INJECTION INFILTRATION; PERINEURAL AS NEEDED
Status: DISCONTINUED | OUTPATIENT
Start: 2020-03-18 | End: 2020-03-18 | Stop reason: HOSPADM

## 2020-03-18 RX ORDER — PROPOFOL 10 MG/ML
INJECTION, EMULSION INTRAVENOUS AS NEEDED
Status: DISCONTINUED | OUTPATIENT
Start: 2020-03-18 | End: 2020-03-18 | Stop reason: HOSPADM

## 2020-03-18 RX ORDER — MISOPROSTOL 100 UG/1
TABLET ORAL AS NEEDED
Status: DISCONTINUED | OUTPATIENT
Start: 2020-03-18 | End: 2020-03-18 | Stop reason: HOSPADM

## 2020-03-18 RX ORDER — KETOROLAC TROMETHAMINE 30 MG/ML
INJECTION, SOLUTION INTRAMUSCULAR; INTRAVENOUS AS NEEDED
Status: DISCONTINUED | OUTPATIENT
Start: 2020-03-18 | End: 2020-03-18 | Stop reason: HOSPADM

## 2020-03-18 RX ORDER — DIPHENHYDRAMINE HYDROCHLORIDE 50 MG/ML
12.5 INJECTION, SOLUTION INTRAMUSCULAR; INTRAVENOUS ONCE
Status: DISCONTINUED | OUTPATIENT
Start: 2020-03-18 | End: 2020-03-18 | Stop reason: HOSPADM

## 2020-03-18 RX ORDER — OXYCODONE AND ACETAMINOPHEN 5; 325 MG/1; MG/1
1 TABLET ORAL AS NEEDED
Status: DISCONTINUED | OUTPATIENT
Start: 2020-03-18 | End: 2020-03-18 | Stop reason: HOSPADM

## 2020-03-18 RX ORDER — FENTANYL CITRATE 50 UG/ML
INJECTION, SOLUTION INTRAMUSCULAR; INTRAVENOUS AS NEEDED
Status: DISCONTINUED | OUTPATIENT
Start: 2020-03-18 | End: 2020-03-18 | Stop reason: HOSPADM

## 2020-03-18 RX ORDER — MIDAZOLAM HYDROCHLORIDE 1 MG/ML
2 INJECTION, SOLUTION INTRAMUSCULAR; INTRAVENOUS
Status: COMPLETED | OUTPATIENT
Start: 2020-03-18 | End: 2020-03-18

## 2020-03-18 RX ORDER — SODIUM CHLORIDE, SODIUM LACTATE, POTASSIUM CHLORIDE, CALCIUM CHLORIDE 600; 310; 30; 20 MG/100ML; MG/100ML; MG/100ML; MG/100ML
100 INJECTION, SOLUTION INTRAVENOUS CONTINUOUS
Status: DISCONTINUED | OUTPATIENT
Start: 2020-03-18 | End: 2020-03-18 | Stop reason: HOSPADM

## 2020-03-18 RX ORDER — METHYLERGONOVINE MALEATE 0.2 MG/ML
INJECTION INTRAVENOUS AS NEEDED
Status: DISCONTINUED | OUTPATIENT
Start: 2020-03-18 | End: 2020-03-18 | Stop reason: HOSPADM

## 2020-03-18 RX ORDER — DEXAMETHASONE SODIUM PHOSPHATE 4 MG/ML
INJECTION, SOLUTION INTRA-ARTICULAR; INTRALESIONAL; INTRAMUSCULAR; INTRAVENOUS; SOFT TISSUE AS NEEDED
Status: DISCONTINUED | OUTPATIENT
Start: 2020-03-18 | End: 2020-03-18 | Stop reason: HOSPADM

## 2020-03-18 RX ORDER — LIDOCAINE HYDROCHLORIDE 20 MG/ML
INJECTION, SOLUTION EPIDURAL; INFILTRATION; INTRACAUDAL; PERINEURAL AS NEEDED
Status: DISCONTINUED | OUTPATIENT
Start: 2020-03-18 | End: 2020-03-18 | Stop reason: HOSPADM

## 2020-03-18 RX ORDER — ACETAMINOPHEN 500 MG
1000 TABLET ORAL ONCE
Status: COMPLETED | OUTPATIENT
Start: 2020-03-18 | End: 2020-03-18

## 2020-03-18 RX ORDER — ONDANSETRON 2 MG/ML
INJECTION INTRAMUSCULAR; INTRAVENOUS AS NEEDED
Status: DISCONTINUED | OUTPATIENT
Start: 2020-03-18 | End: 2020-03-18 | Stop reason: HOSPADM

## 2020-03-18 RX ORDER — MISOPROSTOL 100 UG/1
100 TABLET ORAL ONCE
Status: COMPLETED | OUTPATIENT
Start: 2020-03-18 | End: 2020-03-18

## 2020-03-18 RX ORDER — HYDROMORPHONE HYDROCHLORIDE 2 MG/ML
0.5 INJECTION, SOLUTION INTRAMUSCULAR; INTRAVENOUS; SUBCUTANEOUS
Status: DISCONTINUED | OUTPATIENT
Start: 2020-03-18 | End: 2020-03-18 | Stop reason: HOSPADM

## 2020-03-18 RX ADMIN — MIDAZOLAM 2 MG: 1 INJECTION INTRAMUSCULAR; INTRAVENOUS at 11:08

## 2020-03-18 RX ADMIN — KETOROLAC TROMETHAMINE 30 MG: 30 INJECTION, SOLUTION INTRAMUSCULAR; INTRAVENOUS at 11:22

## 2020-03-18 RX ADMIN — SODIUM CHLORIDE, SODIUM LACTATE, POTASSIUM CHLORIDE, AND CALCIUM CHLORIDE: 600; 310; 30; 20 INJECTION, SOLUTION INTRAVENOUS at 11:25

## 2020-03-18 RX ADMIN — MISOPROSTOL 100 MCG: 100 TABLET ORAL at 09:48

## 2020-03-18 RX ADMIN — HYDROMORPHONE HYDROCHLORIDE 0.5 MG: 2 INJECTION INTRAMUSCULAR; INTRAVENOUS; SUBCUTANEOUS at 12:03

## 2020-03-18 RX ADMIN — ACETAMINOPHEN 1000 MG: 500 TABLET, FILM COATED ORAL at 09:48

## 2020-03-18 RX ADMIN — DOXYCYCLINE 100 MG: 100 INJECTION, POWDER, LYOPHILIZED, FOR SOLUTION INTRAVENOUS at 11:15

## 2020-03-18 RX ADMIN — FENTANYL CITRATE 50 MCG: 50 INJECTION INTRAMUSCULAR; INTRAVENOUS at 11:20

## 2020-03-18 RX ADMIN — HYDROMORPHONE HYDROCHLORIDE 0.5 MG: 2 INJECTION INTRAMUSCULAR; INTRAVENOUS; SUBCUTANEOUS at 12:13

## 2020-03-18 RX ADMIN — PROPOFOL 200 MG: 10 INJECTION, EMULSION INTRAVENOUS at 11:11

## 2020-03-18 RX ADMIN — SODIUM CHLORIDE, SODIUM LACTATE, POTASSIUM CHLORIDE, AND CALCIUM CHLORIDE 75 ML/HR: 600; 310; 30; 20 INJECTION, SOLUTION INTRAVENOUS at 09:43

## 2020-03-18 RX ADMIN — METHYLERGONOVINE MALEATE 0.2 MG: 0.2 INJECTION, SOLUTION INTRAMUSCULAR; INTRAVENOUS at 11:24

## 2020-03-18 RX ADMIN — LIDOCAINE HYDROCHLORIDE 0.1 ML: 10 INJECTION, SOLUTION INFILTRATION; PERINEURAL at 09:43

## 2020-03-18 RX ADMIN — OXYCODONE HYDROCHLORIDE 5 MG: 5 TABLET ORAL at 11:52

## 2020-03-18 RX ADMIN — SODIUM CHLORIDE, SODIUM LACTATE, POTASSIUM CHLORIDE, AND CALCIUM CHLORIDE: 600; 310; 30; 20 INJECTION, SOLUTION INTRAVENOUS at 11:36

## 2020-03-18 RX ADMIN — DEXAMETHASONE SODIUM PHOSPHATE 10 MG: 4 INJECTION, SOLUTION INTRAMUSCULAR; INTRAVENOUS at 11:16

## 2020-03-18 RX ADMIN — ONDANSETRON 4 MG: 2 INJECTION INTRAMUSCULAR; INTRAVENOUS at 11:16

## 2020-03-18 RX ADMIN — LIDOCAINE HYDROCHLORIDE 100 MG: 20 INJECTION, SOLUTION EPIDURAL; INFILTRATION; INTRACAUDAL; PERINEURAL at 11:11

## 2020-03-18 RX ADMIN — FAMOTIDINE 20 MG: 20 TABLET ORAL at 09:48

## 2020-03-18 RX ADMIN — FENTANYL CITRATE 50 MCG: 50 INJECTION INTRAMUSCULAR; INTRAVENOUS at 11:11

## 2020-03-18 NOTE — DISCHARGE INSTRUCTIONS
Patient Education        Miscarriage: Care Instructions  Your Care Instructions    The loss of a pregnancy can be very hard. You may wonder why it happened or blame yourself. Miscarriages are common and are not caused by exercise, stress, or sex. Most happen because the fertilized egg in the uterus does not develop normally. There is no treatment that can stop a miscarriage. As long as you do not have heavy blood loss, fever, weakness, or other signs of infection, you can let a miscarriage follow its own course. This can take several days. Your body will recover over the next several weeks. Having a miscarriage does not mean you cannot have a normal pregnancy in the future. The doctor has checked you carefully, but problems can develop later. If you notice any problems or new symptoms, get medical treatment right away. Follow-up care is a key part of your treatment and safety. Be sure to make and go to all appointments, and call your doctor if you are having problems. It's also a good idea to know your test results and keep a list of the medicines you take. How can you care for yourself at home? · You will probably have some vaginal bleeding for 1 to 2 weeks. It may be similar to or slightly heavier than a normal period. The bleeding should get lighter after a week. Use pads instead of tampons. You may use tampons during your next period, which should start in 3 to 6 weeks. · Take an over-the-counter pain medicine, such as acetaminophen (Tylenol), ibuprofen (Advil, Motrin), or naproxen (Aleve) for cramps. Read and follow all instructions on the label. You may have cramps for several days after the miscarriage. · Do not take two or more pain medicines at the same time unless the doctor told you to. Many pain medicines have acetaminophen, which is Tylenol. Too much acetaminophen (Tylenol) can be harmful. · Use a clear container to save any tissue that you pass.  Take it to your doctor's office as soon as you can.  · Do not have sex until the bleeding stops. · You may return to your normal activities if you feel well enough to do so. But you should avoid heavy exercise until the bleeding stops. · If you would like to try to get pregnant again, it is usually safe whenever you feel ready. Talk with your doctor about any future pregnancy plans. · If you do not want to get pregnant, ask your doctor about birth control. You can get pregnant again before your next period starts if you are not using birth control. · You may be low in iron because of blood loss. Eat a balanced diet that is high in iron and vitamin C. Foods rich in iron include red meat, shellfish, eggs, beans, and leafy green vegetables. Foods high in vitamin C include citrus fruits, tomatoes, and broccoli. Talk to your doctor about whether you need to take iron pills or a multivitamin. · The loss of a pregnancy can be very hard. You may wonder why it happened and blame yourself. Talking to family members, friends, a counselor, or your doctor may help you cope with your loss. When should you call for help? Call 911 anytime you think you may need emergency care. For example, call if:    · You passed out (lost consciousness).    Call your doctor now or seek immediate medical care if:    · You have severe vaginal bleeding.     · You are dizzy or lightheaded, or you feel like you may faint.     · You have new or worse pain in your belly or pelvis.     · You have a fever.     · You have vaginal discharge that smells bad.    Watch closely for changes in your health, and be sure to contact your doctor if:    · You do not get better as expected. Where can you learn more? Go to http://gini-analilia.info/  Enter E802 in the search box to learn more about \"Miscarriage: Care Instructions. \"  Current as of: May 29, 2019Content Version: 12.4  © 4900-8295 Healthwise, Incorporated.   Care instructions adapted under license by [employer] (which disclaims liability or warranty for this information). If you have questions about a medical condition or this instruction, always ask your healthcare professional. [address] any warranty or liability for your use of this information. Patient Education        Dilation and Curettage: What to Expect at Home  Your Recovery  Dilation and curettage (D&C) is a procedure to remove tissue from the inside of the uterus. The doctor used a curved tool, called a curette, to gently scrape tissue from your uterus. You are likely to have a backache, or cramps similar to menstrual cramps, and pass small clots of blood from your vagina for the first few days. You may continue to have light vaginal bleeding for several weeks after the procedure. You will probably be able to go back to most of your normal activities in 1 or 2 days. This care sheet gives you a general idea about how long it will take for you to recover. But each person recovers at a different pace. Follow the steps below to get better as quickly as possible. How can you care for yourself at home? Activity    · Rest when you feel tired. Getting enough sleep will help you recover.     · Avoid strenuous activities, such as bicycle riding, jogging, weight lifting, or aerobic exercise, until your doctor says it is okay.     · Most women are able to return to work the day after the procedure.     · You may have some light vaginal bleeding. Wear sanitary pads if needed. Do not douche or use tampons for 2 weeks or until your doctor says it is okay.     · Ask your doctor when it is okay for you to have sex.     · If you could become pregnant, talk about birth control with your doctor. Do not try to become pregnant until your doctor says it is okay. Diet    · You can eat your normal diet.  If your stomach is upset, try bland, low-fat foods like plain rice, broiled chicken, toast, and yogurt.     · Drink plenty of fluids (unless your doctor tells you not to). Medicines    · Your doctor will tell you if and when you can restart your medicines. He or she will also give you instructions about taking any new medicines.     · If you take aspirin or some other blood thinner, ask your doctor if and when to start taking it again. Make sure that you understand exactly what your doctor wants you to do.     · Be safe with medicines. Take pain medicines exactly as directed. ? If the doctor gave you a prescription medicine for pain, take it as prescribed. ? If you are not taking a prescription pain medicine, ask your doctor if you can take an over-the-counter medicine.     · If you think your pain medicine is making you sick to your stomach:  ? Take your medicine after meals (unless your doctor has told you not to). ? Ask your doctor for a different pain medicine.     · If your doctor prescribed antibiotics, take them as directed. Do not stop taking them just because you feel better. You need to take the full course of antibiotics. Follow-up care is a key part of your treatment and safety. Be sure to make and go to all appointments, and call your doctor if you are having problems. It's also a good idea to know your test results and keep a list of the medicines you take. When should you call for help? Call 911 anytime you think you may need emergency care.  For example, call if:    · You passed out (lost consciousness).     · You have chest pain, are short of breath, or cough up blood.    Call your doctor now or seek immediate medical care if:    · You have bright red vaginal bleeding that soaks one or more pads in an hour, or you have large clots.     · You have vaginal discharge that increases in amount or smells bad.     · You are sick to your stomach or cannot drink fluids.     · You have pain that does not get better after you take pain medicine.     · You cannot pass stools or gas.     · You have symptoms of a blood clot in your leg (called a deep vein thrombosis), such as:  ? Pain in your calf, back of the knee, thigh, or groin. ? Redness and swelling in your leg.     · You have signs of infection, such as:  ? Increased pain, swelling, warmth, or redness. ? Red streaks leading from the area. ? Pus draining from the area. ? A fever.    Watch closely for changes in your health, and be sure to contact your doctor if you have any problems. Where can you learn more? Go to http://gini-analilia.info/  Enter D453 in the search box to learn more about \"Dilation and Curettage: What to Expect at Home. \"  Current as of: May 29, 2019Content Version: 12.4  © 1004-7604 Healthwise, Incorporated. Care instructions adapted under license by [employer] (which disclaims liability or warranty for this information). If you have questions about a medical condition or this instruction, always ask your healthcare professional. [place] [address] any warranty or liability for your use of this information.

## 2020-03-18 NOTE — ANESTHESIA POSTPROCEDURE EVALUATION
Procedure(s):  DILATATION AND CURETTAGE WITH SUCTION/ 13 WKS/ O+.     general    Anesthesia Post Evaluation      Multimodal analgesia: multimodal analgesia used between 6 hours prior to anesthesia start to PACU discharge  Patient location during evaluation: bedside  Patient participation: complete - patient participated  Level of consciousness: awake  Pain management: adequate  Airway patency: patent  Anesthetic complications: no  Cardiovascular status: acceptable and stable  Respiratory status: acceptable and room air  Hydration status: acceptable  Post anesthesia nausea and vomiting:  none      Vitals Value Taken Time   /57 3/18/2020 11:41 AM   Temp 37 °C (98.6 °F) 3/18/2020 11:35 AM   Pulse 69 3/18/2020 11:41 AM   Resp 16 3/18/2020 11:41 AM   SpO2 100 % 3/18/2020 11:41 AM

## 2020-03-18 NOTE — PROGRESS NOTES
Support given to patient in 13 week fetal demise. \"[employer]\" early pregnancy loss brochure and [facility] support group information given along with contact information. Patient is known to me from multiple losses. Patient decided on [facility] for burial of baby remains. Consent form placed on chart to go with baby remains. I will continue to follow and support as needed. Álvaro Rueda M.Div.

## 2020-03-18 NOTE — PERIOP NOTES
3/18/2020      RE: Jenn Hilton      To Whom it May Concern: This is to certify that Darryl Naqvi had surgery today. She may return to work tomorrow. Please feel free to contact my office if you have any questions or concerns. Thank you for your assistance in this matter.     Sincerely,      Dr Petra Caldwell

## 2020-03-18 NOTE — ANESTHESIA PREPROCEDURE EVALUATION
Relevant Problems   No relevant active problems       Anesthetic History   No history of anesthetic complications            Review of Systems / Medical History  Patient summary reviewed and pertinent labs reviewed    Pulmonary  Within defined limits                 Neuro/Psych   Within defined limits           Cardiovascular    Hypertension: well controlled              Exercise tolerance: >4 METS     GI/Hepatic/Renal  Within defined limits              Endo/Other        Obesity     Other Findings              Physical Exam    Airway  Mallampati: I  TM Distance: 4 - 6 cm  Neck ROM: normal range of motion   Mouth opening: Normal     Cardiovascular  Regular rate and rhythm,  S1 and S2 normal,  no murmur, click, rub, or gallop  Rhythm: regular  Rate: normal         Dental  No notable dental hx    Comments: Several missing   Pulmonary  Breath sounds clear to auscultation               Abdominal  Abdominal exam normal       Other Findings            Anesthetic Plan    ASA: 2  Anesthesia type: general          Induction: Intravenous  Anesthetic plan and risks discussed with: Patient

## 2020-03-18 NOTE — OP NOTES
Shweta Angelica Hilton    1995        Preop Dx:    Missed       Postop Dx:  Same      Procedure:  Suction dilation and curretage      Surgeon:  Babs Schulz        Anesthesia:  LMA general      EBL:  25 mL     IVF:  1883 mL         Complications:  None      Procedure:    Patient was taken to the operating room where general anesthesia was found to be adequate. She was prepped and draped in the usual sterile fashion and placed in the lithotomy position in [employer] Weighted speculum was placed in patient's vagina and anterior lip of cervix grasped with a single tooth tenaculum. Cervix was sequentially dilated with Hegar dilators to a #11. #10 suction curette was introduced and products of conception were removed in the usual fashion with multiple passes. Sharp curettage was undertaken until the uterus had a gritty texture throughout. All instruments removed from the patient's vagina and hemostasis was assured throughout. Cytotec was placed rectally for additional hemostasis. Patient tolerated procedure well. Sponge, lap and needle counts correct x 3.       Disposition:  Pt to RR in stable condition      Pathology: Products of conception      Makeda Krishnamurthy MD   11:29 AM  20

## 2020-03-18 NOTE — INTERVAL H&P NOTE
I have examined and spoken with the patient this morning. She has no new medical issues or complaints. She reports no new medicines since H&P. She again understands procedure, risks/benefits and agrees to proceed.   Sherry Alves MD  9:49 AM  03/18/20

## 2020-04-01 PROBLEM — O09.299 HX OF PREECLAMPSIA, PRIOR PREGNANCY, CURRENTLY PREGNANT: Status: RESOLVED | Noted: 2020-03-13 | Resolved: 2020-04-01

## 2020-04-01 PROBLEM — O02.1 MISSED ABORTION: Status: RESOLVED | Noted: 2020-03-18 | Resolved: 2020-04-01

## 2020-04-01 PROBLEM — O02.1 MISSED AB: Status: RESOLVED | Noted: 2017-03-27 | Resolved: 2020-04-01

## 2020-04-01 PROBLEM — Z09 POSTOPERATIVE EXAMINATION: Status: ACTIVE | Noted: 2020-04-01

## 2020-04-01 PROBLEM — O28.5 ABNORMAL FETAL CHROMOSOMAL ANALYSIS AFFECTING ANTEPARTUM CARE OF MOTHER: Status: RESOLVED | Noted: 2020-03-09 | Resolved: 2020-04-01

## 2020-04-01 PROBLEM — O09.91 HIGH-RISK PREGNANCY IN FIRST TRIMESTER: Status: RESOLVED | Noted: 2020-02-18 | Resolved: 2020-04-01

## 2020-08-11 ENCOUNTER — HOSPITAL ENCOUNTER (EMERGENCY)
Age: 25
Discharge: HOME | End: 2020-08-11
Attending: EMERGENCY MEDICINE
Payer: MEDICAID

## 2020-08-11 VITALS
BODY MASS INDEX: 32.49 KG/M2 | RESPIRATION RATE: 16 BRPM | HEIGHT: 67 IN | SYSTOLIC BLOOD PRESSURE: 143 MMHG | DIASTOLIC BLOOD PRESSURE: 87 MMHG | HEART RATE: 84 BPM | WEIGHT: 207 LBS | OXYGEN SATURATION: 100 % | TEMPERATURE: 98.4 F

## 2020-08-11 DIAGNOSIS — N39.0 URINARY TRACT INFECTION WITHOUT HEMATURIA, SITE UNSPECIFIED: Primary | ICD-10-CM

## 2020-08-11 LAB
BACTERIA URNS QL MICRO: ABNORMAL /HPF
CASTS URNS QL MICRO: 0 /LPF
CRYSTALS URNS QL MICRO: 0 /LPF
EPI CELLS #/AREA URNS HPF: ABNORMAL /HPF
HCG UR QL: NEGATIVE
MUCOUS THREADS URNS QL MICRO: ABNORMAL /LPF
OTHER OBSERVATIONS,UCOM: ABNORMAL
RBC #/AREA URNS HPF: ABNORMAL /HPF
SERVICE CMNT-IMP: NORMAL
WBC URNS QL MICRO: ABNORMAL /HPF
WET PREP GENITAL: NORMAL
WET PREP GENITAL: NORMAL

## 2020-08-11 PROCEDURE — 87210 SMEAR WET MOUNT SALINE/INK: CPT

## 2020-08-11 PROCEDURE — 81015 MICROSCOPIC EXAM OF URINE: CPT

## 2020-08-11 PROCEDURE — 99284 EMERGENCY DEPT VISIT MOD MDM: CPT

## 2020-08-11 PROCEDURE — 81003 URINALYSIS AUTO W/O SCOPE: CPT

## 2020-08-11 PROCEDURE — 87491 CHLMYD TRACH DNA AMP PROBE: CPT

## 2020-08-11 PROCEDURE — 81025 URINE PREGNANCY TEST: CPT

## 2020-08-11 RX ORDER — PHENAZOPYRIDINE HYDROCHLORIDE 200 MG/1
200 TABLET, FILM COATED ORAL 3 TIMES DAILY
Qty: 6 TAB | Refills: 0 | Status: SHIPPED | OUTPATIENT
Start: 2020-08-11 | End: 2020-08-13

## 2020-08-11 RX ORDER — DOXYCYCLINE HYCLATE 100 MG
100 TABLET ORAL 2 TIMES DAILY
Qty: 14 TAB | Refills: 0 | Status: SHIPPED | OUTPATIENT
Start: 2020-08-11 | End: 2020-10-26

## 2020-08-11 NOTE — ED TRIAGE NOTES
Pt given mask upon arrival. Presents to ED c/o 72 hours of urinary frequency/burning and pressure at end of stream. Denies vaginal discharge. Denies fever/chills.

## 2020-08-11 NOTE — ED PROVIDER NOTES
20-year-old female states 3-day history of vaginal irritation and burning. Somewhat worse with urination. Does have some terminal dysuria. No discharge. No abnormal vaginal bleeding. No abdominal pain fever vomiting or back pain. Does have a history of hypertension. The history is provided by the patient. Urinary Pain    This is a new problem. The current episode started more than 2 days ago. The problem occurs every urination. The problem has not changed since onset. The quality of the pain is described as burning. The pain is mild. There has been no fever. Associated symptoms include urgency. Pertinent negatives include no chills, no nausea, no vomiting, no frequency, no hematuria, no hesitancy, no flank pain, no vaginal discharge, no abdominal pain and no back pain. She has tried nothing for the symptoms.         Past Medical History:   Diagnosis Date    Anemia     Genital herpes simplex 3/22/2018    Hypertension     gestational BP    Missed ab 2/10/15    Preeclampsia     Second trimester bleeding 8/1/2014    Subchorionic bleed 8/2/2014       Past Surgical History:   Procedure Laterality Date    HX GYN      HX OTHER SURGICAL      D&C 2015    HX OTHER SURGICAL      D&C 2016         Family History:   Problem Relation Age of Onset    Asthma Father     Diabetes Father     Bleeding Prob Brother         Brain    Diabetes Brother     Other Brother         brain tumor    Other Paternal Grandmother         Lung cancer    Other Mother         cervical cancer    Other Maternal Grandmother         cervical cancer       Social History     Socioeconomic History    Marital status: SINGLE     Spouse name: Not on file    Number of children: Not on file    Years of education: Not on file    Highest education level: Not on file   Occupational History    Not on file   Social Needs    Financial resource strain: Not on file    Food insecurity     Worry: Not on file     Inability: Not on file   [address] [place] [address] Transportation needs     Medical: Not on file     Non-medical: Not on file   Tobacco Use    Smoking status: Former Smoker     Packs/day: 0.25     Years: 0.50     Pack years: 0.12    Smokeless tobacco: Never Used   Substance and Sexual Activity    Alcohol use: No    Drug use: No    Sexual activity: Yes     Partners: Male   Lifestyle    Physical activity     Days per week: Not on file     Minutes per session: Not on file    Stress: Not on file   Relationships    Social connections     Talks on phone: Not on file     Gets together: Not on file     Attends Sabianism service: Not on file     Active member of club or organization: Not on file     Attends meetings of clubs or organizations: Not on file     Relationship status: Not on file    Intimate partner violence     Fear of current or ex partner: Not on file     Emotionally abused: Not on file     Physically abused: Not on file     Forced sexual activity: Not on file   Other Topics Concern    Not on file   Social History Narrative    Not on file         ALLERGIES: Bactrim [sulfamethoprim ds]    Review of Systems   Constitutional: Negative for chills and fever. Gastrointestinal: Negative for abdominal pain, nausea and vomiting. Genitourinary: Positive for urgency. Negative for flank pain, frequency, hematuria, hesitancy, vaginal bleeding and vaginal discharge. Musculoskeletal: Negative for back pain. Vitals:    08/11/20 1745   BP: 147/68   Pulse: 84   Resp: 16   Temp: 98.7 °F (37.1 °C)   SpO2: 100%   Weight: 93.9 kg (207 lb)   Height: 5' 7\" (1.702 m)            Physical Exam  Vitals signs and nursing note reviewed. Constitutional:       Appearance: She is not ill-appearing. Genitourinary:     Labia:         Right: No rash or lesion. Left: No rash or lesion. Vagina: Normal.      Cervix: Normal.   Skin:     General: Skin is warm and dry. Neurological:      Mental Status: She is alert.           MDM  Number of Diagnoses or Management Options  Diagnosis management comments: Check for UTI. Rule out pregnancy. Pelvic examination. Risk of Complications, Morbidity, and/or Mortality  Presenting problems: moderate  Diagnostic procedures: minimal  Management options: low    Patient Progress  Patient progress: stable         Procedures        Results Include:    Recent Results (from the past 24 hour(s))   WET PREP    Collection Time: 08/11/20  6:22 PM    Specimen: Vagina   Result Value Ref Range    Special Requests: NO SPECIAL REQUESTS      Wet prep 3 TO 5 WBCS PER HPF     Wet prep NO YEAST,TRICHOMONAS OR CLUE CELLS NOTED       Urine dip positive for nitrates and leukocytes. Will treat for UTI/urethral syndrome.

## 2020-08-11 NOTE — ED NOTES
I have reviewed discharge instructions with the patient. The patient verbalized understanding. Patient left ED via Discharge Method: ambulatory to Home with self. Opportunity for questions and clarification provided. Patient given 2 scripts. Education was given with verbal feedback to nurse. The pt was in no acute distress at DE,.            Yves Jiménez

## 2020-08-11 NOTE — DISCHARGE INSTRUCTIONS
Plenty of fluids. Pyridium will turn your urine orange. Recheck with your doctor 4 to 5 days if not improving. Recheck sooner for worse pain vomiting or high fever. Patient Education        Urinary Tract Infection in Women: Care Instructions  Your Care Instructions     A urinary tract infection, or UTI, is a general term for an infection anywhere between the kidneys and the urethra (where urine comes out). Most UTIs are bladder infections. They often cause pain or burning when you urinate. UTIs are caused by bacteria and can be cured with antibiotics. Be sure to complete your treatment so that the infection goes away. Follow-up care is a key part of your treatment and safety. Be sure to make and go to all appointments, and call your doctor if you are having problems. It's also a good idea to know your test results and keep a list of the medicines you take. How can you care for yourself at home? · Take your antibiotics as directed. Do not stop taking them just because you feel better. You need to take the full course of antibiotics. · Drink extra water and other fluids for the next day or two. This may help wash out the bacteria that are causing the infection. (If you have kidney, heart, or liver disease and have to limit fluids, talk with your doctor before you increase your fluid intake.)  · Avoid drinks that are carbonated or have caffeine. They can irritate the bladder. · Urinate often. Try to empty your bladder each time. · To relieve pain, take a hot bath or lay a heating pad set on low over your lower belly or genital area. Never go to sleep with a heating pad in place. To prevent UTIs  · Drink plenty of water each day. This helps you urinate often, which clears bacteria from your system. (If you have kidney, heart, or liver disease and have to limit fluids, talk with your doctor before you increase your fluid intake.)  · Urinate when you need to. · Urinate right after you have sex.   · Change sanitary pads often. · Avoid douches, bubble baths, feminine hygiene sprays, and other feminine hygiene products that have deodorants. · After going to the bathroom, wipe from front to back. When should you call for help? Call your doctor now or seek immediate medical care if:  · Symptoms such as fever, chills, nausea, or vomiting get worse or appear for the first time. · You have new pain in your back just below your rib cage. This is called flank pain. · There is new blood or pus in your urine. · You have any problems with your antibiotic medicine. Watch closely for changes in your health, and be sure to contact your doctor if:  · You are not getting better after taking an antibiotic for 2 days. · Your symptoms go away but then come back. Where can you learn more? Go to http://gini-analilia.info/  Enter Q061 in the search box to learn more about \"Urinary Tract Infection in Women: Care Instructions. \"  Current as of: August 22, 2019               Content Version: 12.5  © 7759-7783 Healthwise, Incorporated. Care instructions adapted under license by [employer] (which disclaims liability or warranty for this information). If you have questions about a medical condition or this instruction, always ask your healthcare professional. [address] any warranty or liability for your use of this information.

## 2020-08-20 LAB
C TRACH RRNA SPEC QL NAA+PROBE: POSITIVE
N GONORRHOEA RRNA SPEC QL NAA+PROBE: NEGATIVE
SPECIMEN SOURCE: ABNORMAL

## 2020-08-21 NOTE — PROGRESS NOTES
Dc with doxycycline for 7 days, however requires 14 days of treatment. I contacted her and have called in an additional 7 days of doxy. Reviewed findings w her. She is feeling a little better.

## 2020-10-26 PROBLEM — A59.01 TRICHOMONAL VAGINITIS DURING PREGNANCY IN FIRST TRIMESTER: Status: RESOLVED | Noted: 2017-02-27 | Resolved: 2020-10-26

## 2020-10-26 PROBLEM — O26.21: Status: ACTIVE | Noted: 2020-10-26

## 2020-10-26 PROBLEM — O28.5 ABNORMAL GENETIC TEST DURING PREGNANCY: Status: ACTIVE | Noted: 2020-10-26

## 2020-10-26 PROBLEM — Z87.59 HISTORY OF PLACENTAL ABRUPTION: Status: RESOLVED | Noted: 2020-10-26 | Resolved: 2020-10-26

## 2020-10-26 PROBLEM — Z34.81 MULTIGRAVIDA IN FIRST TRIMESTER: Status: ACTIVE | Noted: 2020-10-26

## 2020-10-26 PROBLEM — O23.591 TRICHOMONAL VAGINITIS DURING PREGNANCY IN FIRST TRIMESTER: Status: RESOLVED | Noted: 2017-02-27 | Resolved: 2020-10-26

## 2020-10-26 PROBLEM — O09.891 HISTORY OF PRETERM DELIVERY, CURRENTLY PREGNANT IN FIRST TRIMESTER: Status: ACTIVE | Noted: 2020-10-26

## 2020-10-26 PROBLEM — Z09 POSTOPERATIVE EXAMINATION: Status: RESOLVED | Noted: 2020-04-01 | Resolved: 2020-10-26

## 2020-10-26 PROBLEM — Z87.59 HISTORY OF PLACENTAL ABRUPTION: Status: ACTIVE | Noted: 2020-10-26

## 2020-10-26 PROBLEM — O98.511 HERPES VIRUS INFECTION IN MOTHER DURING FIRST TRIMESTER OF PREGNANCY: Status: ACTIVE | Noted: 2020-10-26

## 2020-10-26 PROBLEM — B00.9 HERPES VIRUS INFECTION IN MOTHER DURING FIRST TRIMESTER OF PREGNANCY: Status: ACTIVE | Noted: 2020-10-26

## 2020-10-26 PROBLEM — O09.291 HISTORY OF PRE-ECLAMPSIA IN PRIOR PREGNANCY, CURRENTLY PREGNANT IN FIRST TRIMESTER: Status: ACTIVE | Noted: 2020-10-26

## 2020-10-26 PROBLEM — O09.292 HISTORY OF PREGNANCY LOSS IN PRIOR PREGNANCY, CURRENTLY PREGNANT IN SECOND TRIMESTER: Status: ACTIVE | Noted: 2020-10-26

## 2020-11-07 ENCOUNTER — HOSPITAL ENCOUNTER (EMERGENCY)
Age: 25
Discharge: HOME | End: 2020-11-07
Attending: STUDENT IN AN ORGANIZED HEALTH CARE EDUCATION/TRAINING PROGRAM
Payer: MEDICAID

## 2020-11-07 ENCOUNTER — APPOINTMENT (OUTPATIENT)
Dept: ULTRASOUND IMAGING | Age: 25
End: 2020-11-07
Attending: STUDENT IN AN ORGANIZED HEALTH CARE EDUCATION/TRAINING PROGRAM
Payer: MEDICAID

## 2020-11-07 VITALS
DIASTOLIC BLOOD PRESSURE: 65 MMHG | TEMPERATURE: 98.5 F | HEART RATE: 89 BPM | RESPIRATION RATE: 16 BRPM | SYSTOLIC BLOOD PRESSURE: 137 MMHG | BODY MASS INDEX: 32.49 KG/M2 | OXYGEN SATURATION: 100 % | WEIGHT: 207 LBS | HEIGHT: 67 IN

## 2020-11-07 DIAGNOSIS — A59.9 TRICHOMONIASIS: ICD-10-CM

## 2020-11-07 DIAGNOSIS — O20.0 THREATENED MISCARRIAGE IN EARLY PREGNANCY: Primary | ICD-10-CM

## 2020-11-07 LAB
ABO + RH BLD: NORMAL
ALBUMIN SERPL-MCNC: 3.6 G/DL (ref 3.5–5)
ALBUMIN/GLOB SERPL: 0.9 {RATIO} (ref 1.2–3.5)
ALP SERPL-CCNC: 39 U/L (ref 50–130)
ALT SERPL-CCNC: 14 U/L (ref 12–65)
ANION GAP SERPL CALC-SCNC: 7 MMOL/L (ref 7–16)
AST SERPL-CCNC: 15 U/L (ref 15–37)
BASOPHILS # BLD: 0 K/UL (ref 0–0.2)
BASOPHILS NFR BLD: 1 % (ref 0–2)
BILIRUB SERPL-MCNC: 0.6 MG/DL (ref 0.2–1.1)
BUN SERPL-MCNC: 6 MG/DL (ref 6–23)
CALCIUM SERPL-MCNC: 8.9 MG/DL (ref 8.3–10.4)
CHLORIDE SERPL-SCNC: 105 MMOL/L (ref 98–107)
CO2 SERPL-SCNC: 25 MMOL/L (ref 21–32)
CREAT SERPL-MCNC: 0.76 MG/DL (ref 0.6–1)
DIFFERENTIAL METHOD BLD: ABNORMAL
EOSINOPHIL # BLD: 0 K/UL (ref 0–0.8)
EOSINOPHIL NFR BLD: 1 % (ref 0.5–7.8)
ERYTHROCYTE [DISTWIDTH] IN BLOOD BY AUTOMATED COUNT: 13.9 % (ref 11.9–14.6)
GLOBULIN SER CALC-MCNC: 4 G/DL (ref 2.3–3.5)
GLUCOSE SERPL-MCNC: 82 MG/DL (ref 65–100)
HCG SERPL-ACNC: ABNORMAL MIU/ML (ref 0–6)
HCT VFR BLD AUTO: 36 % (ref 35.8–46.3)
HGB BLD-MCNC: 12.1 G/DL (ref 11.7–15.4)
IMM GRANULOCYTES # BLD AUTO: 0 K/UL (ref 0–0.5)
IMM GRANULOCYTES NFR BLD AUTO: 0 % (ref 0–5)
LYMPHOCYTES # BLD: 1.6 K/UL (ref 0.5–4.6)
LYMPHOCYTES NFR BLD: 36 % (ref 13–44)
MCH RBC QN AUTO: 30.6 PG (ref 26.1–32.9)
MCHC RBC AUTO-ENTMCNC: 33.6 G/DL (ref 31.4–35)
MCV RBC AUTO: 91.1 FL (ref 79.6–97.8)
MONOCYTES # BLD: 0.5 K/UL (ref 0.1–1.3)
MONOCYTES NFR BLD: 10 % (ref 4–12)
NEUTS SEG # BLD: 2.3 K/UL (ref 1.7–8.2)
NEUTS SEG NFR BLD: 53 % (ref 43–78)
NRBC # BLD: 0 K/UL (ref 0–0.2)
PLATELET # BLD AUTO: 264 K/UL (ref 150–450)
PMV BLD AUTO: 10.1 FL (ref 9.4–12.3)
POTASSIUM SERPL-SCNC: 3.8 MMOL/L (ref 3.5–5.1)
PROT SERPL-MCNC: 7.6 G/DL (ref 6.3–8.2)
RBC # BLD AUTO: 3.95 M/UL (ref 4.05–5.2)
SERVICE CMNT-IMP: NORMAL
SODIUM SERPL-SCNC: 137 MMOL/L (ref 136–145)
WBC # BLD AUTO: 4.4 K/UL (ref 4.3–11.1)
WET PREP GENITAL: NORMAL

## 2020-11-07 PROCEDURE — 99283 EMERGENCY DEPT VISIT LOW MDM: CPT

## 2020-11-07 PROCEDURE — 85025 COMPLETE CBC W/AUTO DIFF WBC: CPT

## 2020-11-07 PROCEDURE — 87491 CHLMYD TRACH DNA AMP PROBE: CPT

## 2020-11-07 PROCEDURE — 74011250637 HC RX REV CODE- 250/637: Performed by: STUDENT IN AN ORGANIZED HEALTH CARE EDUCATION/TRAINING PROGRAM

## 2020-11-07 PROCEDURE — 99284 EMERGENCY DEPT VISIT MOD MDM: CPT

## 2020-11-07 PROCEDURE — 87210 SMEAR WET MOUNT SALINE/INK: CPT

## 2020-11-07 PROCEDURE — 86900 BLOOD TYPING SEROLOGIC ABO: CPT

## 2020-11-07 PROCEDURE — 80053 COMPREHEN METABOLIC PANEL: CPT

## 2020-11-07 PROCEDURE — 74011250636 HC RX REV CODE- 250/636: Performed by: STUDENT IN AN ORGANIZED HEALTH CARE EDUCATION/TRAINING PROGRAM

## 2020-11-07 PROCEDURE — 76815 OB US LIMITED FETUS(S): CPT

## 2020-11-07 PROCEDURE — 84702 CHORIONIC GONADOTROPIN TEST: CPT

## 2020-11-07 RX ORDER — METRONIDAZOLE 500 MG/1
2000 TABLET ORAL ONCE
Status: COMPLETED | OUTPATIENT
Start: 2020-11-07 | End: 2020-11-07

## 2020-11-07 RX ORDER — ONDANSETRON 4 MG/1
4 TABLET, ORALLY DISINTEGRATING ORAL
Status: COMPLETED | OUTPATIENT
Start: 2020-11-07 | End: 2020-11-07

## 2020-11-07 RX ADMIN — SODIUM CHLORIDE 1000 ML: 900 INJECTION, SOLUTION INTRAVENOUS at 08:28

## 2020-11-07 RX ADMIN — ONDANSETRON 4 MG: 4 TABLET, ORALLY DISINTEGRATING ORAL at 11:46

## 2020-11-07 RX ADMIN — METRONIDAZOLE 2000 MG: 500 TABLET, FILM COATED ORAL at 11:46

## 2020-11-07 NOTE — ED PROVIDER NOTES
Lorenzo 29 at 9 weeks 5 days history emergency department with reports of vaginal spotting that started last night. Patient states when she went to the bathroom and wiped, she noted pink-tinged discharge present. She called her OB office as she is high risk and was instructed to monitor for 24 hours and come to this department if symptoms continued. Symptoms continue this to morning prompting her visit to this department. Patient reports no pain. She denies any other vaginal discharge, change in urination or bowel movements. Patient reports multiple multiple earlier miscarriages in the past of unknown cause. She is followed by maternal-fetal medicine and high risk OB through [facility].            Past Medical History:   Diagnosis Date    Anemia     Genital herpes simplex 3/22/2018    Hypertension     gestational BP    Missed ab 2/10/15    Postoperative examination 4/1/2020    Suction D&C on 3/18/2020    Preeclampsia     Second trimester bleeding 8/1/2014    Subchorionic bleed 8/2/2014       Past Surgical History:   Procedure Laterality Date    HX GYN      HX OTHER SURGICAL      D&C 2015    HX OTHER SURGICAL      D&C 2016         Family History:   Problem Relation Age of Onset    Asthma Father     Diabetes Father     Bleeding Prob Brother         Brain    Diabetes Brother     Other Brother         brain tumor    Other Paternal Grandmother         Lung cancer    Other Mother         cervical cancer    Other Maternal Grandmother         cervical cancer       Social History     Socioeconomic History    Marital status: SINGLE     Spouse name: Not on file    Number of children: Not on file    Years of education: Not on file    Highest education level: Not on file   Occupational History    Not on file   Social Needs    Financial resource strain: Not on file    Food insecurity     Worry: Not on file     Inability: Not on file    Transportation needs     Medical: Not on file Non-medical: Not on file   Tobacco Use    Smoking status: Former Smoker     Packs/day: 0.25     Years: 0.50     Pack years: 0.12    Smokeless tobacco: Never Used   Substance and Sexual Activity    Alcohol use: No    Drug use: No    Sexual activity: Yes     Partners: Male   Lifestyle    Physical activity     Days per week: Not on file     Minutes per session: Not on file    Stress: Not on file   Relationships    Social connections     Talks on phone: Not on file     Gets together: Not on file     Attends Hindu service: Not on file     Active member of club or organization: Not on file     Attends meetings of clubs or organizations: Not on file     Relationship status: Not on file    Intimate partner violence     Fear of current or ex partner: Not on file     Emotionally abused: Not on file     Physically abused: Not on file     Forced sexual activity: Not on file   Other Topics Concern    Not on file   Social History Narrative    Abuse: Feels safe at home, no history of physical abuse, no history of sexual abuse         ALLERGIES: Bactrim [sulfamethoprim ds]    Review of Systems   Constitutional: Negative for chills, diaphoresis and fever. HENT: Negative for congestion, sneezing and sore throat. Eyes: Negative for visual disturbance. Respiratory: Negative for cough, chest tightness, shortness of breath and wheezing. Cardiovascular: Negative for chest pain and leg swelling. Gastrointestinal: Negative for abdominal pain, blood in stool, diarrhea, nausea and vomiting. Endocrine: Negative for polyuria. Genitourinary: Negative for difficulty urinating, dysuria, flank pain, hematuria and urgency. Musculoskeletal: Negative for back pain, myalgias, neck pain and neck stiffness. Skin: Negative for color change and rash. Neurological: Negative for dizziness, syncope, speech difficulty, weakness, light-headedness, numbness and headaches.    Psychiatric/Behavioral: Negative for behavioral problems. Vitals:    11/07/20 0803   BP: 131/72   Pulse: 89   Resp: 16   Temp: 98.5 °F (36.9 °C)   SpO2: 100%   Weight: 93.9 kg (207 lb)   Height: 5' 7\" (1.702 m)            Physical Exam  Vitals signs and nursing note reviewed. Exam conducted with a chaperone present. Constitutional:       General: She is not in acute distress. Appearance: She is well-developed. She is not diaphoretic. Comments: Alert and oriented to person place and time. No acute distress, speaks in clear, fluid sentences. HENT:      Head: Normocephalic and atraumatic. Right Ear: External ear normal.      Left Ear: External ear normal.      Nose: Nose normal.   Eyes:      Pupils: Pupils are equal, round, and reactive to light. Neck:      Musculoskeletal: Normal range of motion. Cardiovascular:      Rate and Rhythm: Normal rate and regular rhythm. Heart sounds: Normal heart sounds. No murmur. No friction rub. No gallop. Pulmonary:      Effort: Pulmonary effort is normal. No respiratory distress. Breath sounds: Normal breath sounds. No stridor. No decreased breath sounds, wheezing, rhonchi or rales. Chest:      Chest wall: No tenderness. Abdominal:      General: There is no distension. Palpations: Abdomen is soft. There is no mass. Tenderness: There is no abdominal tenderness. There is no guarding or rebound. Hernia: No hernia is present. Genitourinary:     General: Normal vulva. Pubic Area: No rash. Vagina: Vaginal discharge and bleeding present. Cervix: Normal.      Uterus: Normal.       Comments: Small amount of dark red clotted material without evidence of active bleeding present. There is clear discharge noted to the vaginal vault. No other focal findings. Musculoskeletal: Normal range of motion. General: No tenderness or deformity. Skin:     General: Skin is warm and dry.    Neurological:      Mental Status: She is alert and oriented to person, place, and time. Cranial Nerves: No cranial nerve deficit. MDM  Number of Diagnoses or Management Options  Threatened miscarriage in early pregnancy: new and does not require workup  Trichomoniasis: new and does not require workup  Diagnosis management comments: Patient reports recent diagnosis of trichomoniasis. She was prescribed Flagyl which she has failed to take as she is scared it may end her pregnancy. Advised patient that she should take this medication as she was diagnosed with trichomonas as this is a risk for miscarriage as well. Wet prep and GC chlamydia testing repeated today. Being ultrasound results. Remainder of labs look normal.    Wet prep reveals ongoing trichomoniasis. Patient will be treated here in this department for this issue. Encourage close outpatient follow-up with her primary OB/GYN provider. Voices understanding and agreement. Voice dictation software was used during the making of this note. This software is not perfect and grammatical and other typographical errors may be present. This note has been proofread, but may still contain errors.   [address]; 11/7/2020 @2:44 PM   ===================================================================         Amount and/or Complexity of Data Reviewed  Clinical lab tests: reviewed and ordered  Tests in the radiology section of CPT®: ordered and reviewed  Tests in the medicine section of CPT®: ordered and reviewed  Independent visualization of images, tracings, or specimens: yes    Risk of Complications, Morbidity, and/or Mortality  Presenting problems: moderate  Diagnostic procedures: low  Management options: moderate    Patient Progress  Patient progress: stable    ED Course as of Nov 07 1443   Sat Nov 07, 2020   1134 Wet prep: MODERATE  MOTILE TRICHOMONAS NOTED   [BR]      ED Course User Index  [BR] Agapito Allan DO       Procedures

## 2020-11-07 NOTE — ED NOTES
Patient advises started with light pink vaginal bleeding last night, advises 9 weeks pregnant with 8th pregnancy and 2 live births. Patient denies any abdominal pain or further complaints. Mask on during triage.

## 2020-11-07 NOTE — ED NOTES
I have reviewed discharge instructions with the patient. The patient verbalized understanding. Patient left ED via Discharge Method: ambulatory to Home with self. Opportunity for questions and clarification provided. Patient given 0 scripts. To continue your aftercare when you leave the hospital, you may receive an automated call from our care team to check in on how you are doing. This is a free service and part of our promise to provide the best care and service to meet your aftercare needs.  If you have questions, or wish to unsubscribe from this service please call [phone]. Thank you for Choosing our [facility] Emergency Department.

## 2020-11-07 NOTE — DISCHARGE INSTRUCTIONS
Patient Education      Your vaginal swabs today show evidence of trichomonas. You have been treated for this infection in this department. You should refrain from all sexual contact until symptoms have completely resolved and the remainder of your cultures are available for review. You should notify your partner of this infection as they will need to be treated as well. Avoid all sexual contact until your partner has been treated fully. Ultrasound imaging shows a normal-appearing pregnancy. It is imperative that you arrange close follow-up with your primary OB/GYN provider as discussed. Threatened Miscarriage: Care Instructions  Overview     Some women have light spotting or bleeding during the first 12 weeks of pregnancy. In some cases this is normal. Light spotting or bleeding can also be a sign of a possible loss of the pregnancy. This is called a threatened miscarriage. At this point, the doctor may not be able to tell if your vaginal bleeding is normal or is a sign of a miscarriage. In early pregnancy, things such as stress, exercise, and sex do not cause miscarriage. You may be worried or upset about the possibility of losing your pregnancy. But do not blame yourself. There is no treatment to stop a miscarriage. If you do have a miscarriage, there was nothing you could have done to prevent it. A miscarriage usually means that the pregnancy is not developing normally. Follow-up care is a key part of your treatment and safety. Be sure to make and go to all appointments, and call your doctor if you are having problems. It's also a good idea to know your test results and keep a list of the medicines you take. How can you care for yourself at home? · Take acetaminophen (Tylenol) for cramps. Read and follow all instructions on the label. · Do not take two or more pain medicines at the same time unless the doctor told you to. Many pain medicines have acetaminophen, which is Tylenol.  Too much acetaminophen (Tylenol) can be harmful. · Do not have sex until your doctor says it is okay. · Get lots of rest over the next several days. · You may do your normal activities if you feel well enough to do them. But do not do any heavy exercise until your doctor says it is okay. · Eat a balanced diet that is high in iron and vitamin C. Foods rich in iron include red meat, shellfish, eggs, beans, and leafy green vegetables. Foods high in vitamin C include citrus fruits, tomatoes, and broccoli. Talk to your doctor about whether you need to take iron pills or a multivitamin. · Do not drink alcohol or use tobacco or illegal drugs. · Do not smoke. If you need help quitting, talk to your doctor about stop-smoking programs and medicines. These can increase your chances of quitting for good. When should you call for help? Call 911 anytime you think you may need emergency care. For example, call if:    · You passed out (lost consciousness). Call your doctor now or seek immediate medical care if:    · You have severe vaginal bleeding.     · You are dizzy or lightheaded, or you feel like you may faint.     · You have new or worse pain in your belly or pelvis.     · You have a fever.     · You have vaginal discharge that smells bad. Watch closely for changes in your health, and be sure to contact your doctor if:    · You do not get better as expected. Where can you learn more? Go to http://www.gray.com/  Enter [id number] in the search box to learn more about \"Threatened Miscarriage: Care Instructions. \"  Current as of: February 11, 2020               Content Version: 12.6  © 5828-4262 [employer]. Care instructions adapted under license by [employer] (which disclaims liability or warranty for this information).  If you have questions about a medical condition or this instruction, always ask your healthcare professional. [employer] disclaims any warranty or liability for your use of this information. Patient Education        Trichomoniasis: Care Instructions  Your Care Instructions  Trichomoniasis is a sexually transmitted infection (STI) that is spread by having sex with an infected partner. Trichomoniasis is commonly called trich (say \"trick\"). In women, trich may cause vaginal itching and a smelly discharge. But in many cases, especially in men, there are no symptoms. Sinda Deter is treated so that you do not spread it to others. Both you and your sex partner or partners should be treated at the same time so you do not infect each other again. Trich may cause problems with pregnancy. Your doctor will talk with you about treatment for Trich if you are pregnant. Follow-up care is a key part of your treatment and safety. Be sure to make and go to all appointments, and call your doctor if you are having problems. It's also a good idea to know your test results and keep a list of the medicines you take. How can you care for yourself at home? · Take your antibiotics as directed. Do not stop taking them just because you feel better. You need to take the full course of antibiotics. · Do not have sex while you are being treated. If your doctor gave you a single dose of antibiotics, do not have sex for one week after being treated and until your partner also has been treated. · Tell your sex partner (or partners) that he or she will also need to be tested and treated. · Use a cold water compress or cool baths to relieve itching. To prevent trichomoniasis in the future  · Use latex condoms every time you have sex. Use them from the beginning to the end of sexual contact. · Talk to your partner before having sex. Find out if he or she has or is at risk for trich or any other STI. Keep in mind that a person may be able to spread an STI even if he or she does not have symptoms. · Do not have sex if you are being treated for trich or any other STI.   · Do not have sex with anyone who has symptoms of an STI, such as sores on the genitals or mouth. · Having one sex partner (who does not have STIs and does not have sex with anyone else) is a good way to avoid STIs. When should you call for help? Call your doctor now or seek immediate medical care if:    · You have unusual vaginal bleeding.     · You have a fever.     · You have new discharge from the vagina or penis.     · You have pelvic pain. Watch closely for changes in your health, and be sure to contact your doctor if:    · You do not get better as expected.     · You have any new symptoms or your symptoms get worse. Where can you learn more? Go to http://www.gray.com/  Enter J471 in the search box to learn more about \"Trichomoniasis: Care Instructions. \"  Current as of: February 26, 2020               Content Version: 12.6  © 0957-9826 [employer]. Care instructions adapted under license by [employer] (which disclaims liability or warranty for this information). If you have questions about a medical condition or this instruction, always ask your healthcare professional. [address] any warranty or liability for your use of this information.

## 2020-11-11 LAB
C TRACH RRNA SPEC QL NAA+PROBE: NEGATIVE
N GONORRHOEA RRNA SPEC QL NAA+PROBE: NEGATIVE
SPECIMEN SOURCE: NORMAL

## 2020-11-23 PROBLEM — Z34.82 MULTIGRAVIDA IN SECOND TRIMESTER: Status: ACTIVE | Noted: 2020-10-26

## 2020-11-23 PROBLEM — O09.291 HISTORY OF PREGNANCY LOSS IN PRIOR PREGNANCY, CURRENTLY PREGNANT IN FIRST TRIMESTER: Status: ACTIVE | Noted: 2020-10-26

## 2020-11-23 PROBLEM — O26.21 HIGH RISK PREGNANCY DUE TO RECURRENT PREGNANCY LOSS, FIRST TRIMESTER: Status: ACTIVE | Noted: 2020-10-26

## 2020-11-23 PROBLEM — O28.5 ABNORMAL GENETIC TEST DURING PREGNANCY: Status: RESOLVED | Noted: 2020-10-26 | Resolved: 2020-11-23

## 2020-11-23 PROBLEM — Z36.82 NUCHAL TRANSLUCENCY OF FETUS ON PRENATAL ULTRASOUND: Status: ACTIVE | Noted: 2020-11-23

## 2020-11-23 PROBLEM — O09.41 HIGH RISK MULTIGRAVIDA IN FIRST TRIMESTER: Status: ACTIVE | Noted: 2020-10-26

## 2020-12-22 PROBLEM — O09.292 HISTORY OF PRE-ECLAMPSIA IN PRIOR PREGNANCY, CURRENTLY PREGNANT IN SECOND TRIMESTER: Status: ACTIVE | Noted: 2020-10-26

## 2020-12-22 PROBLEM — O98.512 HERPES VIRUS INFECTION IN MOTHER DURING SECOND TRIMESTER OF PREGNANCY: Status: ACTIVE | Noted: 2020-10-26

## 2020-12-22 PROBLEM — O09.892 HISTORY OF PRETERM DELIVERY, CURRENTLY PREGNANT IN SECOND TRIMESTER: Status: ACTIVE | Noted: 2020-10-26

## 2021-01-11 PROBLEM — O09.92 HIGH-RISK PREGNANCY IN SECOND TRIMESTER: Status: ACTIVE | Noted: 2020-10-26

## 2021-01-11 PROBLEM — O26.22: Status: ACTIVE | Noted: 2020-10-26

## 2021-02-22 PROBLEM — O36.5990 POOR FETAL GROWTH, AFFECTING MANAGEMENT OF MOTHER, ANTEPARTUM CONDITION OR COMPLICATION: Status: ACTIVE | Noted: 2021-02-22

## 2021-03-09 PROBLEM — R21 VULVOVAGINAL RASH: Status: ACTIVE | Noted: 2021-03-09

## 2021-03-11 PROBLEM — Z22.330 GBS CARRIER: Status: ACTIVE | Noted: 2021-03-11

## 2021-03-22 PROBLEM — O09.293 HISTORY OF PREGNANCY LOSS IN PRIOR PREGNANCY, CURRENTLY PREGNANT IN THIRD TRIMESTER: Status: ACTIVE | Noted: 2020-10-26

## 2021-03-22 PROBLEM — O98.513 HERPES VIRUS INFECTION IN MOTHER DURING THIRD TRIMESTER OF PREGNANCY: Status: ACTIVE | Noted: 2020-10-26

## 2021-03-22 PROBLEM — O09.93 HIGH-RISK PREGNANCY IN THIRD TRIMESTER: Status: ACTIVE | Noted: 2020-10-26

## 2021-03-22 PROBLEM — O26.23: Status: ACTIVE | Noted: 2020-10-26

## 2021-03-22 PROBLEM — O09.893 HISTORY OF PRETERM DELIVERY, CURRENTLY PREGNANT IN THIRD TRIMESTER: Status: ACTIVE | Noted: 2020-10-26

## 2021-03-23 PROBLEM — O99.013 ANEMIA AFFECTING PREGNANCY IN THIRD TRIMESTER: Status: ACTIVE | Noted: 2021-03-23

## 2021-03-24 PROBLEM — R21 VULVOVAGINAL RASH: Status: RESOLVED | Noted: 2021-03-09 | Resolved: 2021-03-24

## 2021-03-25 PROBLEM — O09.293 HISTORY OF PRE-ECLAMPSIA IN PRIOR PREGNANCY, CURRENTLY PREGNANT IN THIRD TRIMESTER: Status: ACTIVE | Noted: 2020-10-26

## 2021-03-25 PROBLEM — O40.3XX0 POLYHYDRAMNIOS AFFECTING PREGNANCY IN THIRD TRIMESTER: Status: ACTIVE | Noted: 2021-03-25

## 2021-04-12 ENCOUNTER — HOSPITAL ENCOUNTER (OUTPATIENT)
Dept: LAB | Age: 26
Discharge: HOME | End: 2021-04-12
Attending: NURSE PRACTITIONER
Payer: COMMERCIAL

## 2021-04-12 LAB
ALBUMIN SERPL-MCNC: 2.6 G/DL (ref 3.5–5)
ALBUMIN/GLOB SERPL: 0.8 {RATIO} (ref 1.2–3.5)
ALP SERPL-CCNC: 385 U/L (ref 50–136)
ALT SERPL-CCNC: 20 U/L (ref 12–65)
ANION GAP SERPL CALC-SCNC: 6 MMOL/L (ref 7–16)
AST SERPL-CCNC: 23 U/L (ref 15–37)
BILIRUB SERPL-MCNC: 0.2 MG/DL (ref 0.2–1.1)
BNP SERPL-MCNC: 51 PG/ML (ref 5–125)
BUN SERPL-MCNC: 7 MG/DL (ref 6–23)
CALCIUM SERPL-MCNC: 8.3 MG/DL (ref 8.3–10.4)
CHLORIDE SERPL-SCNC: 107 MMOL/L (ref 98–107)
CO2 SERPL-SCNC: 23 MMOL/L (ref 21–32)
CREAT SERPL-MCNC: 0.51 MG/DL (ref 0.6–1)
CREAT UR-MCNC: 249 MG/DL
ERYTHROCYTE [DISTWIDTH] IN BLOOD BY AUTOMATED COUNT: 14.1 % (ref 11.9–14.6)
GLOBULIN SER CALC-MCNC: 3.2 G/DL (ref 2.3–3.5)
GLUCOSE SERPL-MCNC: 88 MG/DL (ref 65–100)
HCT VFR BLD AUTO: 31.9 % (ref 35.8–46.3)
HGB BLD-MCNC: 10.7 G/DL (ref 11.7–15.4)
LDH SERPL L TO P-CCNC: 249 U/L (ref 100–190)
MCH RBC QN AUTO: 31.5 PG (ref 26.1–32.9)
MCHC RBC AUTO-ENTMCNC: 33.5 G/DL (ref 31.4–35)
MCV RBC AUTO: 93.8 FL (ref 79.6–97.8)
NRBC # BLD: 0 K/UL (ref 0–0.2)
PLATELET # BLD AUTO: 201 K/UL (ref 150–450)
PMV BLD AUTO: 11.3 FL (ref 9.4–12.3)
POTASSIUM SERPL-SCNC: 4 MMOL/L (ref 3.5–5.1)
PROT SERPL-MCNC: 5.8 G/DL (ref 6.3–8.2)
PROT UR-MCNC: 29 MG/DL
PROT/CREAT UR-RTO: 0.1
RBC # BLD AUTO: 3.4 M/UL (ref 4.05–5.2)
SODIUM SERPL-SCNC: 136 MMOL/L (ref 136–145)
URATE SERPL-MCNC: 3.2 MG/DL (ref 2.6–6)
WBC # BLD AUTO: 4.8 K/UL (ref 4.3–11.1)

## 2021-04-12 PROCEDURE — 80053 COMPREHEN METABOLIC PANEL: CPT

## 2021-04-12 PROCEDURE — 85027 COMPLETE CBC AUTOMATED: CPT

## 2021-04-12 PROCEDURE — 84550 ASSAY OF BLOOD/URIC ACID: CPT

## 2021-04-12 PROCEDURE — 36415 COLL VENOUS BLD VENIPUNCTURE: CPT

## 2021-04-12 PROCEDURE — 83615 LACTATE (LD) (LDH) ENZYME: CPT

## 2021-04-12 PROCEDURE — 84156 ASSAY OF PROTEIN URINE: CPT

## 2021-04-12 PROCEDURE — 83880 ASSAY OF NATRIURETIC PEPTIDE: CPT

## 2021-04-13 ENCOUNTER — HOSPITAL ENCOUNTER (OUTPATIENT)
Dept: ULTRASOUND IMAGING | Age: 26
Discharge: HOME | End: 2021-04-13
Attending: NURSE PRACTITIONER
Payer: COMMERCIAL

## 2021-04-13 DIAGNOSIS — O36.5990 POOR FETAL GROWTH AFFECTING MANAGEMENT OF MOTHER, ANTEPARTUM, SINGLE OR UNSPECIFIED FETUS: ICD-10-CM

## 2021-04-13 DIAGNOSIS — O09.93 HIGH-RISK PREGNANCY IN THIRD TRIMESTER: ICD-10-CM

## 2021-04-13 DIAGNOSIS — O26.23: ICD-10-CM

## 2021-04-13 DIAGNOSIS — O09.293 HISTORY OF PRE-ECLAMPSIA IN PRIOR PREGNANCY, CURRENTLY PREGNANT IN THIRD TRIMESTER: ICD-10-CM

## 2021-04-13 DIAGNOSIS — O12.03 SWELLING OF LOWER EXTREMITY DURING PREGNANCY IN THIRD TRIMESTER: ICD-10-CM

## 2021-04-13 PROCEDURE — 93970 EXTREMITY STUDY: CPT

## 2021-04-20 PROBLEM — R82.71 GBS BACTERIURIA: Status: ACTIVE | Noted: 2021-03-11

## 2021-04-20 PROBLEM — Z34.83 MULTIGRAVIDA IN THIRD TRIMESTER: Status: ACTIVE | Noted: 2020-10-26

## 2021-04-22 ENCOUNTER — HOSPITAL ENCOUNTER (OUTPATIENT)
Dept: LAB | Age: 26
Discharge: HOME | End: 2021-04-22
Attending: NURSE PRACTITIONER
Payer: COMMERCIAL

## 2021-04-22 PROBLEM — O13.3 GESTATIONAL HYPERTENSION, THIRD TRIMESTER: Status: ACTIVE | Noted: 2021-04-22

## 2021-04-22 LAB
ALBUMIN SERPL-MCNC: 2.7 G/DL (ref 3.5–5)
ALBUMIN/GLOB SERPL: 0.7 {RATIO} (ref 1.2–3.5)
ALP SERPL-CCNC: 411 U/L (ref 50–136)
ALT SERPL-CCNC: 24 U/L (ref 12–65)
ANION GAP SERPL CALC-SCNC: 4 MMOL/L (ref 7–16)
AST SERPL-CCNC: 26 U/L (ref 15–37)
BILIRUB SERPL-MCNC: 0.2 MG/DL (ref 0.2–1.1)
BUN SERPL-MCNC: 7 MG/DL (ref 6–23)
CALCIUM SERPL-MCNC: 8.8 MG/DL (ref 8.3–10.4)
CHLORIDE SERPL-SCNC: 106 MMOL/L (ref 98–107)
CO2 SERPL-SCNC: 25 MMOL/L (ref 21–32)
CREAT SERPL-MCNC: 0.66 MG/DL (ref 0.6–1)
CREAT UR-MCNC: 185 MG/DL
ERYTHROCYTE [DISTWIDTH] IN BLOOD BY AUTOMATED COUNT: 14.3 % (ref 11.9–14.6)
GLOBULIN SER CALC-MCNC: 3.7 G/DL (ref 2.3–3.5)
GLUCOSE SERPL-MCNC: 106 MG/DL (ref 65–100)
HCT VFR BLD AUTO: 33.5 % (ref 35.8–46.3)
HGB BLD-MCNC: 11.1 G/DL (ref 11.7–15.4)
LDH SERPL L TO P-CCNC: 213 U/L (ref 100–190)
MCH RBC QN AUTO: 31.4 PG (ref 26.1–32.9)
MCHC RBC AUTO-ENTMCNC: 33.1 G/DL (ref 31.4–35)
MCV RBC AUTO: 94.9 FL (ref 79.6–97.8)
NRBC # BLD: 0 K/UL (ref 0–0.2)
PLATELET # BLD AUTO: 201 K/UL (ref 150–450)
PMV BLD AUTO: 11.4 FL (ref 9.4–12.3)
POTASSIUM SERPL-SCNC: 3.7 MMOL/L (ref 3.5–5.1)
PROT SERPL-MCNC: 6.4 G/DL (ref 6.3–8.2)
PROT UR-MCNC: 27 MG/DL
PROT/CREAT UR-RTO: 0.1
RBC # BLD AUTO: 3.53 M/UL (ref 4.05–5.2)
SODIUM SERPL-SCNC: 135 MMOL/L (ref 136–145)
URATE SERPL-MCNC: 3.4 MG/DL (ref 2.6–6)
WBC # BLD AUTO: 5.6 K/UL (ref 4.3–11.1)

## 2021-04-22 PROCEDURE — 83615 LACTATE (LD) (LDH) ENZYME: CPT

## 2021-04-22 PROCEDURE — 36415 COLL VENOUS BLD VENIPUNCTURE: CPT

## 2021-04-22 PROCEDURE — 84550 ASSAY OF BLOOD/URIC ACID: CPT

## 2021-04-22 PROCEDURE — 85027 COMPLETE CBC AUTOMATED: CPT

## 2021-04-22 PROCEDURE — 80053 COMPREHEN METABOLIC PANEL: CPT

## 2021-04-22 PROCEDURE — 84156 ASSAY OF PROTEIN URINE: CPT

## 2021-04-29 ENCOUNTER — HOSPITAL ENCOUNTER (EMERGENCY)
Age: 26
Discharge: HOME | DRG: 560 | End: 2021-04-29
Attending: OBSTETRICS & GYNECOLOGY | Admitting: OBSTETRICS & GYNECOLOGY
Payer: COMMERCIAL

## 2021-04-29 VITALS — SYSTOLIC BLOOD PRESSURE: 151 MMHG | HEART RATE: 94 BPM | DIASTOLIC BLOOD PRESSURE: 72 MMHG

## 2021-04-29 LAB
ALBUMIN SERPL-MCNC: 2.9 G/DL (ref 3.5–5)
ALBUMIN/GLOB SERPL: 0.7 {RATIO} (ref 1.2–3.5)
ALP SERPL-CCNC: 456 U/L (ref 50–136)
ALT SERPL-CCNC: 26 U/L (ref 12–65)
ANION GAP SERPL CALC-SCNC: 5 MMOL/L (ref 7–16)
AST SERPL-CCNC: 36 U/L (ref 15–37)
BILIRUB SERPL-MCNC: 0.2 MG/DL (ref 0.2–1.1)
BUN SERPL-MCNC: 8 MG/DL (ref 6–23)
CALCIUM SERPL-MCNC: 9.3 MG/DL (ref 8.3–10.4)
CHLORIDE SERPL-SCNC: 105 MMOL/L (ref 98–107)
CO2 SERPL-SCNC: 25 MMOL/L (ref 21–32)
CREAT SERPL-MCNC: 0.63 MG/DL (ref 0.6–1)
CREAT UR-MCNC: 197 MG/DL
ERYTHROCYTE [DISTWIDTH] IN BLOOD BY AUTOMATED COUNT: 14.2 % (ref 11.9–14.6)
GLOBULIN SER CALC-MCNC: 4.3 G/DL (ref 2.3–3.5)
GLUCOSE SERPL-MCNC: 66 MG/DL (ref 65–100)
HCT VFR BLD AUTO: 36.3 % (ref 35.8–46.3)
HGB BLD-MCNC: 12.3 G/DL (ref 11.7–15.4)
LDH SERPL L TO P-CCNC: 292 U/L (ref 100–190)
MCH RBC QN AUTO: 32.1 PG (ref 26.1–32.9)
MCHC RBC AUTO-ENTMCNC: 33.9 G/DL (ref 31.4–35)
MCV RBC AUTO: 94.8 FL (ref 79.6–97.8)
NRBC # BLD: 0 K/UL (ref 0–0.2)
PLATELET # BLD AUTO: 209 K/UL (ref 150–450)
PMV BLD AUTO: 11.3 FL (ref 9.4–12.3)
POTASSIUM SERPL-SCNC: 4 MMOL/L (ref 3.5–5.1)
PROT SERPL-MCNC: 7.2 G/DL (ref 6.3–8.2)
PROT UR-MCNC: 41 MG/DL
PROT/CREAT UR-RTO: 0.2
RBC # BLD AUTO: 3.83 M/UL (ref 4.05–5.2)
SODIUM SERPL-SCNC: 135 MMOL/L (ref 136–145)
URATE SERPL-MCNC: 3.8 MG/DL (ref 2.6–6)
WBC # BLD AUTO: 7 K/UL (ref 4.3–11.1)

## 2021-04-29 PROCEDURE — 99285 EMERGENCY DEPT VISIT HI MDM: CPT

## 2021-04-29 PROCEDURE — 74011250636 HC RX REV CODE- 250/636: Performed by: OBSTETRICS & GYNECOLOGY

## 2021-04-29 PROCEDURE — 82570 ASSAY OF URINE CREATININE: CPT

## 2021-04-29 PROCEDURE — 96372 THER/PROPH/DIAG INJ SC/IM: CPT

## 2021-04-29 PROCEDURE — 96360 HYDRATION IV INFUSION INIT: CPT

## 2021-04-29 PROCEDURE — 80053 COMPREHEN METABOLIC PANEL: CPT

## 2021-04-29 PROCEDURE — 59025 FETAL NON-STRESS TEST: CPT

## 2021-04-29 PROCEDURE — 83615 LACTATE (LD) (LDH) ENZYME: CPT

## 2021-04-29 PROCEDURE — 85027 COMPLETE CBC AUTOMATED: CPT

## 2021-04-29 PROCEDURE — 84550 ASSAY OF BLOOD/URIC ACID: CPT

## 2021-04-29 RX ORDER — BETAMETHASONE SODIUM PHOSPHATE AND BETAMETHASONE ACETATE 3; 3 MG/ML; MG/ML
12 INJECTION, SUSPENSION INTRA-ARTICULAR; INTRALESIONAL; INTRAMUSCULAR; SOFT TISSUE ONCE
Status: COMPLETED | OUTPATIENT
Start: 2021-04-29 | End: 2021-04-29

## 2021-04-29 RX ORDER — SODIUM CHLORIDE, SODIUM LACTATE, POTASSIUM CHLORIDE, CALCIUM CHLORIDE 600; 310; 30; 20 MG/100ML; MG/100ML; MG/100ML; MG/100ML
500 INJECTION, SOLUTION INTRAVENOUS CONTINUOUS
Status: DISCONTINUED | OUTPATIENT
Start: 2021-04-29 | End: 2021-04-29 | Stop reason: HOSPADM

## 2021-04-29 RX ORDER — TERBUTALINE SULFATE 1 MG/ML
0.25 INJECTION SUBCUTANEOUS
Status: COMPLETED | OUTPATIENT
Start: 2021-04-29 | End: 2021-04-29

## 2021-04-29 RX ADMIN — BETAMETHASONE SODIUM PHOSPHATE AND BETAMETHASONE ACETATE 12 MG: 3; 3 INJECTION, SUSPENSION INTRA-ARTICULAR; INTRALESIONAL; INTRAMUSCULAR at 14:45

## 2021-04-29 RX ADMIN — SODIUM CHLORIDE, SODIUM LACTATE, POTASSIUM CHLORIDE, AND CALCIUM CHLORIDE 500 ML: 600; 310; 30; 20 INJECTION, SOLUTION INTRAVENOUS at 15:10

## 2021-04-29 RX ADMIN — TERBUTALINE SULFATE 0.25 MG: 1 INJECTION SUBCUTANEOUS at 14:58

## 2021-04-29 NOTE — PROGRESS NOTES
Pt arrived to [facility] from Dr. Clemente Palma office for Pre-E labs, UC monitoring and SVE.

## 2021-04-29 NOTE — DISCHARGE INSTRUCTIONS
Patient Education        Weeks 34 to 39 of Your Pregnancy: Care Instructions  Overview     By now, your baby and your belly have grown quite large. It's almost time to give birth! Your baby's lungs are almost ready to breathe air. The skull bones are firm enough to protect your baby's head, but soft enough to move down through the birth canal.  You may be feeling excited and happy at times--but also anxious or scared. You might wonder how you'll know if you're in labor or what to expect during labor. Try to be open and flexible in your expectations of the birth. Because each birth is different, there's no way to know exactly what childbirth will be like for you. Talk to your doctor or midwife about any concerns you have. If you haven't already had the Tdap shot during this pregnancy, talk to your doctor about getting it. It will help protect your  against pertussis infection. In the 36th week, most women have a test for group B streptococcus (GBS). GBS is a common bacteria that can live in the vagina and rectum. It can make your baby sick after birth. If you test positive, you will get antibiotics during labor. The medicine will help keep your baby from getting the bacteria. Follow-up care is a key part of your treatment and safety. Be sure to make and go to all appointments, and call your doctor if you are having problems. It's also a good idea to know your test results and keep a list of the medicines you take. How can you care for yourself at home? Learn about pain relief choices  · Pain is different for every woman. Talk with your doctor about your feelings about pain. · You can choose from several types of pain relief. These include medicine or breathing techniques, as well as comfort measures. You can use more than one option. · If you choose to have pain medicine during labor, talk to your doctor about your options. Some medicines lower anxiety and help with some of the pain.  Others make your lower body numb so that you won't feel pain. · Be sure to tell your doctor about your pain medicine choice before you start labor or very early in your labor. You may be able to change your mind as labor progresses. · Rarely, a woman is put to sleep by medicine given through a mask or an IV. Labor and delivery  · The first stage of labor has three parts: early, active, and transition. ? Most women have early labor at home. You can stay busy or rest, eat light snacks, drink clear fluids, and start counting contractions. ? When talking during a contraction gets hard, you may be moving to active labor. During active labor, you should head for the hospital if you are not there already. ? You are in active labor when contractions come every 3 to 4 minutes and last about 60 seconds. Your cervix is opening more rapidly. ? If your water breaks, contractions will come faster and stronger. ? During transition, your cervix is stretching, and contractions are coming more rapidly. ? You may want to push, but your cervix might not be ready. Your doctor will tell you when to push. · The second stage starts when your cervix is completely opened and you are ready to push. ? Contractions are very strong to push the baby down the birth canal.  ? You will feel the urge to push. You may feel like you need to have a bowel movement. ? You may be coached to push with contractions. These contractions will be very strong, but you will not have them as often. You can get a little rest between contractions. ? You may be emotional and irritable. You may not be aware of what is going on around you.  ? One last push, and your baby is born. · The third stage is when a few more contractions push out the placenta. This may take 30 minutes or less. · The fourth stage is the welcome recovery. You may feel overwhelmed with emotions and exhausted but alert. This is a good time to start breastfeeding. Where can you learn more?   Go to http://www.gray.com/  Enter N281 in the search box to learn more about \"Weeks 34 to 36 of Your Pregnancy: Care Instructions. \"  Current as of: October 8, 2020               Content Version: 12.8  © 2006-2021 Healthwise, [employer]. Care instructions adapted under license by [employer] (which disclaims liability or warranty for this information). If you have questions about a medical condition or this instruction, always ask your healthcare professional. [address] any warranty or liability for your use of this information.

## 2021-04-29 NOTE — PROGRESS NOTES
Strip reviewed. FHR reassuring  Contractions are every 3-4 minutes. Per RN still 4 cm  Ordered terbutaline sq now.      Javy Raman MD  2:47 PM

## 2021-04-30 ENCOUNTER — HOSPITAL ENCOUNTER (INPATIENT)
Age: 26
LOS: 5 days | Discharge: HOME | DRG: 560 | End: 2021-05-05
Attending: OBSTETRICS & GYNECOLOGY | Admitting: OBSTETRICS & GYNECOLOGY
Payer: COMMERCIAL

## 2021-04-30 DIAGNOSIS — Z34.83 MULTIGRAVIDA IN THIRD TRIMESTER: Primary | ICD-10-CM

## 2021-04-30 DIAGNOSIS — O09.293 HISTORY OF PRE-ECLAMPSIA IN PRIOR PREGNANCY, CURRENTLY PREGNANT IN THIRD TRIMESTER: ICD-10-CM

## 2021-04-30 DIAGNOSIS — O14.93 PRE-ECLAMPSIA IN THIRD TRIMESTER: ICD-10-CM

## 2021-04-30 PROBLEM — Z3A.34 34 WEEKS GESTATION OF PREGNANCY: Status: ACTIVE | Noted: 2021-04-30

## 2021-04-30 PROBLEM — O60.03 PRETERM LABOR IN THIRD TRIMESTER: Status: ACTIVE | Noted: 2021-04-30

## 2021-04-30 LAB
ABO + RH BLD: NORMAL
ALBUMIN SERPL-MCNC: 2.8 G/DL (ref 3.5–5)
ALBUMIN/GLOB SERPL: 0.7 {RATIO} (ref 1.2–3.5)
ALP SERPL-CCNC: 430 U/L (ref 50–130)
ALT SERPL-CCNC: 23 U/L (ref 12–65)
ANION GAP SERPL CALC-SCNC: 6 MMOL/L (ref 7–16)
AST SERPL-CCNC: 35 U/L (ref 15–37)
BASOPHILS # BLD: 0 K/UL (ref 0–0.2)
BASOPHILS NFR BLD: 0 % (ref 0–2)
BILIRUB SERPL-MCNC: 0.3 MG/DL (ref 0.2–1.1)
BLOOD GROUP ANTIBODIES SERPL: NORMAL
BUN SERPL-MCNC: 9 MG/DL (ref 6–23)
CALCIUM SERPL-MCNC: 8.7 MG/DL (ref 8.3–10.4)
CHLORIDE SERPL-SCNC: 107 MMOL/L (ref 98–107)
CO2 SERPL-SCNC: 24 MMOL/L (ref 21–32)
CREAT SERPL-MCNC: 0.73 MG/DL (ref 0.6–1)
CREAT UR-MCNC: 369 MG/DL
DIFFERENTIAL METHOD BLD: ABNORMAL
EOSINOPHIL # BLD: 0 K/UL (ref 0–0.8)
EOSINOPHIL NFR BLD: 0 % (ref 0.5–7.8)
ERYTHROCYTE [DISTWIDTH] IN BLOOD BY AUTOMATED COUNT: 14.7 % (ref 11.9–14.6)
GLOBULIN SER CALC-MCNC: 4.1 G/DL (ref 2.3–3.5)
GLUCOSE SERPL-MCNC: 89 MG/DL (ref 65–100)
HCT VFR BLD AUTO: 30.7 % (ref 35.8–46.3)
HGB BLD-MCNC: 10.4 G/DL (ref 11.7–15.4)
IMM GRANULOCYTES # BLD AUTO: 0 K/UL (ref 0–0.5)
IMM GRANULOCYTES NFR BLD AUTO: 1 % (ref 0–5)
LDH SERPL L TO P-CCNC: 366 U/L (ref 100–190)
LYMPHOCYTES # BLD: 1.3 K/UL (ref 0.5–4.6)
LYMPHOCYTES NFR BLD: 15 % (ref 13–44)
MCH RBC QN AUTO: 32.1 PG (ref 26.1–32.9)
MCHC RBC AUTO-ENTMCNC: 33.9 G/DL (ref 31.4–35)
MCV RBC AUTO: 94.8 FL (ref 79.6–97.8)
MONOCYTES # BLD: 0.8 K/UL (ref 0.1–1.3)
MONOCYTES NFR BLD: 9 % (ref 4–12)
NEUTS SEG # BLD: 6.7 K/UL (ref 1.7–8.2)
NEUTS SEG NFR BLD: 76 % (ref 43–78)
NRBC # BLD: 0 K/UL (ref 0–0.2)
PLATELET # BLD AUTO: 180 K/UL (ref 150–450)
PMV BLD AUTO: 11.1 FL (ref 9.4–12.3)
POTASSIUM SERPL-SCNC: 4.2 MMOL/L (ref 3.5–5.1)
PROT SERPL-MCNC: 6.9 G/DL (ref 6.3–8.2)
PROT UR-MCNC: 116 MG/DL
PROT/CREAT UR-RTO: 0.3
RBC # BLD AUTO: 3.24 M/UL (ref 4.05–5.2)
SODIUM SERPL-SCNC: 137 MMOL/L (ref 136–145)
SPECIMEN EXP DATE BLD: NORMAL
URATE SERPL-MCNC: 4 MG/DL (ref 2.6–6)
WBC # BLD AUTO: 8.8 K/UL (ref 4.3–11.1)

## 2021-04-30 PROCEDURE — 82570 ASSAY OF URINE CREATININE: CPT

## 2021-04-30 PROCEDURE — 74011250637 HC RX REV CODE- 250/637: Performed by: OBSTETRICS & GYNECOLOGY

## 2021-04-30 PROCEDURE — 65270000029 HC RM PRIVATE

## 2021-04-30 PROCEDURE — 99218 HC RM OBSERVATION: CPT

## 2021-04-30 PROCEDURE — 74011250636 HC RX REV CODE- 250/636: Performed by: OBSTETRICS & GYNECOLOGY

## 2021-04-30 PROCEDURE — 80053 COMPREHEN METABOLIC PANEL: CPT

## 2021-04-30 PROCEDURE — 86901 BLOOD TYPING SEROLOGIC RH(D): CPT

## 2021-04-30 PROCEDURE — 84550 ASSAY OF BLOOD/URIC ACID: CPT

## 2021-04-30 PROCEDURE — 84156 ASSAY OF PROTEIN URINE: CPT

## 2021-04-30 PROCEDURE — 83615 LACTATE (LD) (LDH) ENZYME: CPT

## 2021-04-30 PROCEDURE — 82575 CREATININE CLEARANCE TEST: CPT

## 2021-04-30 PROCEDURE — 85025 COMPLETE CBC W/AUTO DIFF WBC: CPT

## 2021-04-30 RX ORDER — BETAMETHASONE SODIUM PHOSPHATE AND BETAMETHASONE ACETATE 3; 3 MG/ML; MG/ML
12 INJECTION, SUSPENSION INTRA-ARTICULAR; INTRALESIONAL; INTRAMUSCULAR; SOFT TISSUE
Status: COMPLETED | OUTPATIENT
Start: 2021-04-30 | End: 2021-04-30

## 2021-04-30 RX ORDER — HYDROCODONE BITARTRATE AND ACETAMINOPHEN 5; 325 MG/1; MG/1
1 TABLET ORAL
Status: DISCONTINUED | OUTPATIENT
Start: 2021-04-30 | End: 2021-05-05 | Stop reason: HOSPADM

## 2021-04-30 RX ORDER — ONDANSETRON 4 MG/1
4 TABLET, ORALLY DISINTEGRATING ORAL
Status: DISCONTINUED | OUTPATIENT
Start: 2021-04-30 | End: 2021-05-05 | Stop reason: HOSPADM

## 2021-04-30 RX ORDER — SODIUM CHLORIDE 0.9 % (FLUSH) 0.9 %
5-40 SYRINGE (ML) INJECTION AS NEEDED
Status: DISCONTINUED | OUTPATIENT
Start: 2021-04-30 | End: 2021-05-03

## 2021-04-30 RX ORDER — SODIUM CHLORIDE 0.9 % (FLUSH) 0.9 %
5-40 SYRINGE (ML) INJECTION EVERY 8 HOURS
Status: DISCONTINUED | OUTPATIENT
Start: 2021-04-30 | End: 2021-05-03

## 2021-04-30 RX ORDER — DOCUSATE SODIUM 100 MG/1
100 CAPSULE, LIQUID FILLED ORAL 2 TIMES DAILY
Status: DISCONTINUED | OUTPATIENT
Start: 2021-04-30 | End: 2021-05-02 | Stop reason: SDUPTHER

## 2021-04-30 RX ORDER — ACETAMINOPHEN 325 MG/1
650 TABLET ORAL
Status: DISCONTINUED | OUTPATIENT
Start: 2021-04-30 | End: 2021-05-05 | Stop reason: HOSPADM

## 2021-04-30 RX ORDER — DIPHENHYDRAMINE HCL 25 MG
25 CAPSULE ORAL
Status: DISCONTINUED | OUTPATIENT
Start: 2021-04-30 | End: 2021-05-05 | Stop reason: HOSPADM

## 2021-04-30 RX ADMIN — HYDROCODONE BITARTRATE AND ACETAMINOPHEN 1 TABLET: 5; 325 TABLET ORAL at 20:30

## 2021-04-30 RX ADMIN — DOCUSATE SODIUM 100 MG: 100 CAPSULE, LIQUID FILLED ORAL at 20:30

## 2021-04-30 RX ADMIN — Medication 10 ML: at 20:30

## 2021-04-30 RX ADMIN — BETAMETHASONE SODIUM PHOSPHATE AND BETAMETHASONE ACETATE 12 MG: 3; 3 INJECTION, SUSPENSION INTRA-ARTICULAR; INTRALESIONAL; INTRAMUSCULAR at 14:57

## 2021-04-30 NOTE — CONSULTS
Neonatology Prenatal Consult:    Prenatal Consult was requested by the obstetrician. Obstetrical Note:  Medical record and notes reviewed. Obstetrical Findings:      Mother's Date of Admission: 2021  2:34 PM   Age: 30 y.o.  NAOMI: Estimated Date of Delivery: 21  Gestation by dates: 34w4d   Pregnancy:   Membrane status:        Social History     Socioeconomic History    Marital status: SINGLE     Spouse name: Not on file    Number of children: Not on file    Years of education: Not on file    Highest education level: Not on file   Tobacco Use    Smoking status: Former Smoker     Packs/day: 0.25     Years: 0.50     Pack years: 0.12     Quit date: 2020     Years since quittin.5    Smokeless tobacco: Never Used   Substance and Sexual Activity    Alcohol use: No    Drug use: No    Sexual activity: Yes     Partners: Male   Other Topics Concern    Caffeine Concern No    Exercise No    Seat Belt Yes    Self-Exams Yes   Social History Narrative    Abuse: Feels safe at home, no history of physical abuse, no history of sexual abuse     Current Facility-Administered Medications   Medication Dose Route Frequency    sodium chloride (NS) flush 5-40 mL  5-40 mL IntraVENous Q8H    sodium chloride (NS) flush 5-40 mL  5-40 mL IntraVENous PRN    acetaminophen (TYLENOL) tablet 650 mg  650 mg Oral Q4H PRN    ondansetron (ZOFRAN ODT) tablet 4 mg  4 mg Oral Q8H PRN    diphenhydrAMINE (BENADRYL) capsule 25 mg  25 mg Oral QHS PRN    docusate sodium (COLACE) capsule 100 mg  100 mg Oral BID    HYDROcodone-acetaminophen (NORCO) 5-325 mg per tablet 1 Tab  1 Tab Oral Q4H PRN     Patient Active Problem List    Diagnosis Date Noted    Pre-eclampsia in third trimester 2021     labor in third trimester 2021    34 weeks gestation of pregnancy 2021    Gestational hypertension, third trimester 2021    Polyhydramnios affecting pregnancy in third trimester 2021  Anemia affecting pregnancy in third trimester 2021    GBS bacteriuria 2021    Poor fetal growth, affecting management of mother, antepartum condition or complication     Multigravida in third trimester 10/26/2020    History of pre-eclampsia in prior pregnancy, currently pregnant in third trimester 10/26/2020    Herpes virus infection in mother during third trimester of pregnancy 10/26/2020    History of pregnancy loss in prior pregnancy, currently pregnant in third trimester 10/26/2020    History of  delivery, currently pregnant in third trimester 10/26/2020    Pregnancy complication, habitual aborter, third trimester 10/26/2020    Heart murmur 2018    Trichomonal vaginitis during pregnancy in first trimester 2017       Lab Results   Component Value Date/Time    ABO/Rh(D) A POSITIVE 2021 04:49 PM    Antibody screen NEG 2021 04:49 PM    Antibody screen, External Negative 2017    HBsAg, External Negative 2017    HIV, External Negative 2017    Rubella, External 20.80 2017    RPR, External Negative 2017    ABO,Rh A+ Positive 2017        Items below were discussed with the parents:      Neonatology coverage reviewed. Survival is very good to excellent. This improves with advancing gestational age. Expected LOS, dependent on gestational age and maturity skills reviewed.  resuscitation team attendance reviewed. Admissions procedures were explained. Family visitation policy were explained. RDS, at some risk. This risk decreases with advancing gestational age. Management of RDS was reviewed. Sepsis evaluation was explained. Feeding techniques reviewed. Mother plans to provide breast milk, yes. The benefits of breast milk were discussed in detail, and breast milk feedings is strongly recommended. Lactation services were also reviewed.   Gut problems, NEC and infections are lessened with breast milk feedings. Significant IVH and ROP are at a low risk as compared to the extreme  gestation. This risk decreases with advancing gestational age. School readiness and learning problems are at a low but increased risk as compared to the full term gestation. Asthma-like problems later in life is at a low but increased risk as compared to term gestation. An increased risk is found with a family history or with smoking. Significant morbidity or complications are a low occurrence for a \"late \" , but with the potential for an extended hospital stay because of temperature maintenance and poor feeding skills. If male , have discussed potential for circumcision. Risks and benefits explained. Family advised to think carefully about this procedure before consenting. SIDS and safe infant sleep practices were reviewed. Family advised to maintain their flu and pertussis vaccinations. Local or primary pediatrician: to be determined. Recommendations: The state  regionalization guidelines were reviewed. The timing and place of delivery is determined by the obstetrical staff. The subsequent appropriate level of  care is determined by the  staff. It is appropriate for the infant to be receive care here in our  Care Unit, if after evaluation of the  infant, it is determined that greater than 32 weeks gestation and greater than 1500 gm, or that a higher level of care is not required, or with consultation with the level III  C. If infant needs NCU care, then will plan to admit to the neonatology service. Attestation:     Total consultation time was 40 minutes with over 50% of the total time was spent in counseling or coordination of care. This included prognosis, risks and benefits of management (treatment) options, importance of compliance with chosen management (treatment) options, and patient and family education. Signed: Geno Herrera MD  Today's Date: 4/30/2021

## 2021-04-30 NOTE — PROGRESS NOTES
Pt moved to 438 for 23 hour observation  24 hr urine started at 1545- discussed in take and output and process for the 24 hr urine. pcr sent. Pt encouraged to drink fluids before I draw blood and INT iv. Pt does not want an iv, this was a compromise before I try to start one.

## 2021-04-30 NOTE — H&P
History & Physical    Name: Oral Love MRN: [id number]  SSN: [id number]    YOB: 1995  Age: 30 y.o. Sex: female      Subjective:     Reason for Admission:  Pregnancy and Preeclampsia and borderline IUGR and hx placental abruption    History of Present Illness: Ms. Tika Jc is a 30 y.o.  female with an estimated gestational age of 31w1d with Estimated Date of Delivery: 21. Patient complains of moderate contractions for 2 days. Pregnancy has been complicated by fetal growth restriction, preeclampsia and history of placental abruption in previous pregnancy, and mild polyhydrmnious. Patient denies abdominal pain  , chest pain, headache , nausea and vomiting, right upper quadrant pain  , shortness of breath, vaginal bleeding  and vaginal leaking of fluid . Pt presented for second betamethasone injection for  contractions. Noted to have elevated bp on arrival. Hx of previous delivery at 30 weeks for pre eclampsia. Hx of placental abruption with fetal loss at 19 weeks. Pt denies hx of chronic hypertension. Habitual aborter.      OB History    Para Term  AB Living   8 3 1 2 4 2   SAB TAB Ectopic Molar Multiple Live Births   4 0 0 0 0 2      # Outcome Date GA Lbr Timoteo/2nd Weight Sex Delivery Anes PTL Lv   8 Current            7 SAB 20 12w0d             Birth Comments: Trisomy 18   6  19 30w0d   F Vag-Spont  N ZAYRA      Complications: Pre-eclampsia   5 2018 8w0d             Birth Comments: D&C required   4 2016 8w0d             Birth Comments: D&C required   3 SAB 2015 8w0d             Birth Comments: D&C required   2  14 19w0d  0.05 kg U VAGINAL DELI None N FD      Birth Comments: [facility] early in pregnancy      Complications: Abruptio Placenta   1 Term 09/09/10 39w0d  2.92 kg M Vag-Spont EPIDURAL AN N ZAYRA      Birth Comments: PreE     Past Medical History:   Diagnosis Date    Abnormal genetic test during pregnancy 10/26/2020    G6 - NIPT elevated risk for trisomy 18, missed AB with suction D&C    Anemia     Genital herpes simplex 3/22/2018    Hypertension     gestational BP    Missed ab 2/10/15    Preeclampsia      delivery     Subchorionic bleed 2014    Vulvovaginal rash 3/9/2021     Past Surgical History:   Procedure Laterality Date    HX GYN      HX OTHER SURGICAL      D&C     HX OTHER SURGICAL      D&C      Social History     Occupational History    Not on file   Tobacco Use    Smoking status: Former Smoker     Packs/day: 0.25     Years: 0.50     Pack years: 0.12     Quit date: 2020     Years since quittin.5    Smokeless tobacco: Never Used   Substance and Sexual Activity    Alcohol use: No    Drug use: No    Sexual activity: Yes     Partners: Male      Family History   Problem Relation Age of Onset    Asthma Father     Diabetes Father     Bleeding Prob Brother         Brain    Diabetes Brother     Other Brother         brain tumor    Other Paternal Grandmother         Lung cancer    Other Mother         cervical cancer    Other Maternal Grandmother         cervical cancer       Allergies   Allergen Reactions    Bactrim [Sulfamethoprim Ds] Nausea and Vomiting     Prior to Admission medications    Medication Sig Start Date End Date Taking? Authorizing Provider   ascorbic acid, vitamin C, (Vitamin C) 500 mg tablet Take 1,000 mg by mouth. Yes Provider, Historical   cholecalciferol, vitamin D3, (Vitamin D3) 50 mcg (2,000 unit) tab Take  by mouth. Yes Provider, Historical   famotidine (PEPCID) 20 mg tablet Take 1 Tab by mouth two (2) times a day. Indications: gastroesophageal reflux disease 21  Yes Mary Faustin MD   valACYclovir (VALTREX) 500 mg tablet Take 1 Tab by mouth two (2) times a day. 20  Yes Jenny Becerra NP   prenatal vit-iron fumarate-fa 27 mg iron- 0.8 mg tab tablet Take 1 Tab by mouth daily.  10/26/20  Yes Jenny Becerra NP   ferrous sulfate (IRON) 325 mg (65 mg iron) EC tablet Take 1 Tab by mouth three (3) times daily (with meals). 3/23/21   Guanaco Hale NP   docusate sodium (COLACE) 100 mg capsule Take 1 Cap by mouth two (2) times a day for 90 days. 3/23/21 6/21/21  Guanaco Hale NP   aspirin delayed-release 81 mg tablet Take 2 Tabs by mouth daily. 20   Guanaco Hale NP        Review of Systems:  A comprehensive review of systems was negative except for that written in the History of Present Illness. A 12 point review of systems neg except as written. Objective:     Vitals:    Vitals:    21 1446 21 1451 21 1455 21 1459   BP: (!) 163/87 (!) 158/83 (!) 151/79 (!) 154/86   Pulse: 88 87 93 85   Resp: 16      Temp: 98.4 °F (36.9 °C)      Weight: (!) 231 kg (509 lb 4.2 oz)      Height: 5' 7\" (1.702 m)         Temp (24hrs), Av.4 °F (36.9 °C), Min:98.4 °F (36.9 °C), Max:98.4 °F (36.9 °C)    BP  Min: 138/69  Max: 163/87     Physical Exam:  Patient without distress. Breast: normal breast exam  Heart: Regular rate and rhythm  Lung: clear to auscultation throughout lung fields, no wheezes, no rales, no rhonchi and normal respiratory effort  Back: costovertebral angle tenderness absent  Abdomen: soft, nontender  Fundus: soft and non tender  Perineum: blood absent, amniotic fluid absent  Cervical Exam: 4 cm dilated    90% effaced    -2 station    Lower Extremities:  - Edema 2+   - Patellar Reflexes: 2+ bilaterally  Skin - no rashes or lesions     Membranes:  Intact  Uterine Activity:  None  Fetal Heart Rate:  Reactive  Baseline: 120 per minute  Variability: moderate  Accelerations: yes  Decelerations: none  Uterine contractions: none       Lab/Data Review:  No results found for this or any previous visit (from the past 24 hour(s)). Assessment and Plan:      Active Problems:    Pre-eclampsia in third trimester (2021)       labor in third trimester (2021)      34 weeks gestation of pregnancy (2021)       - Preeclampsia:  mild  Complete 48 hour course of steroids to promote fetal lung maturity.   Strict Input and Output and Daily weights  Preeclamptic Labs daily  Check 24 hour urine for total Protein  Daily Fetal monitoring with Non-stress tests     Pt has already received bmz X 2 for  labor (second dose given just prior to admission today)  No current contractions sve /-2    Last growth ultrasound on 4/15/21 = 9.7%  Or 3#12oz  vtx , bright = 22  Yesterday bright = 15    Labs pending

## 2021-04-30 NOTE — PROGRESS NOTES
Pt placed on EFM for NST, pt states some mild contractions and some round ligament pain discomfort. Mild cramping palpated.

## 2021-05-01 PROBLEM — O14.90 PREECLAMPSIA: Status: ACTIVE | Noted: 2021-05-01

## 2021-05-01 LAB
COLLECT DURATION TIME UR: 24 HR
COLLECT DURATION TIME UR: 24 HR
CREAT 24H CL BSA ADJ PNL UR+SERPL: 103 ML/MIN
CREAT 24H UR-MRATE: 1350 MG/24HR (ref 600–1800)
CREAT UR-MCNC: 135 MG/DL
PROT 24H UR-MRATE: 370 MG/24HR
PROT UR-MCNC: 37 MG/DL
SPECIMEN VOL ?TM UR: 1000 ML
SPECIMEN VOL ?TM UR: 1000 ML

## 2021-05-01 PROCEDURE — 74011250637 HC RX REV CODE- 250/637: Performed by: OBSTETRICS & GYNECOLOGY

## 2021-05-01 PROCEDURE — 99218 HC RM OBSERVATION: CPT

## 2021-05-01 PROCEDURE — 65270000029 HC RM PRIVATE

## 2021-05-01 PROCEDURE — 99232 SBSQ HOSP IP/OBS MODERATE 35: CPT | Performed by: OBSTETRICS & GYNECOLOGY

## 2021-05-01 PROCEDURE — 59025 FETAL NON-STRESS TEST: CPT | Performed by: OBSTETRICS & GYNECOLOGY

## 2021-05-01 RX ORDER — VALACYCLOVIR HYDROCHLORIDE 500 MG/1
500 TABLET, FILM COATED ORAL EVERY 12 HOURS
Status: DISCONTINUED | OUTPATIENT
Start: 2021-05-01 | End: 2021-05-05 | Stop reason: HOSPADM

## 2021-05-01 RX ORDER — NIFEDIPINE 10 MG/1
10 CAPSULE ORAL EVERY 6 HOURS
Status: DISCONTINUED | OUTPATIENT
Start: 2021-05-01 | End: 2021-05-04

## 2021-05-01 RX ADMIN — DOCUSATE SODIUM 100 MG: 100 CAPSULE, LIQUID FILLED ORAL at 08:09

## 2021-05-01 RX ADMIN — HYDROCODONE BITARTRATE AND ACETAMINOPHEN 1 TABLET: 5; 325 TABLET ORAL at 02:19

## 2021-05-01 RX ADMIN — ACETAMINOPHEN 650 MG: 325 TABLET, FILM COATED ORAL at 14:21

## 2021-05-01 RX ADMIN — NIFEDIPINE 10 MG: 10 CAPSULE ORAL at 08:09

## 2021-05-01 RX ADMIN — NIFEDIPINE 10 MG: 10 CAPSULE ORAL at 20:16

## 2021-05-01 RX ADMIN — Medication 10 ML: at 14:21

## 2021-05-01 RX ADMIN — NIFEDIPINE 10 MG: 10 CAPSULE ORAL at 14:16

## 2021-05-01 RX ADMIN — Medication 10 ML: at 22:00

## 2021-05-01 RX ADMIN — DOCUSATE SODIUM 100 MG: 100 CAPSULE, LIQUID FILLED ORAL at 18:22

## 2021-05-01 RX ADMIN — VALACYCLOVIR HYDROCHLORIDE 500 MG: 500 TABLET, FILM COATED ORAL at 21:10

## 2021-05-01 RX ADMIN — HYDROCODONE BITARTRATE AND ACETAMINOPHEN 1 TABLET: 5; 325 TABLET ORAL at 16:29

## 2021-05-01 NOTE — PROGRESS NOTES
Called to pt's room, pt states she is having some back pain on her right side that she rates a 4/10. Pt also states \"I have a weird feeling\". Pt unable to describe feeling in detail. Pt denies any contractions or cramping but pt does look uncomfortable in the bed. Pt very anxious and seems very hesitant to express any pain she is in. Mild cramping palpated. Pt accepted [place] for her back pain. Will continue to monitor.

## 2021-05-01 NOTE — PROGRESS NOTES
Mild cramping still palpated and felt by pt. No contractions picked up on the monitor. Pt rates discomfort as a 5/10. Pt states some back and hip pain with some cramping. Pt states she has been having this discomfort for 2-3 weeks. Pt given [place] and Colace and instructed to drink water. Will continue to monitor.

## 2021-05-01 NOTE — PROGRESS NOTES
Pt resting, vitals WNL. Pt now rates discomfort a 2/10 and states she is comfortable and wants to continue sleeping. Pt denies any needs at this time.

## 2021-05-01 NOTE — PROGRESS NOTES
0700 Bedside and Verbal shift change report given to SAMUEL Amanda RN (oncoming nurse) by Ben Glover RN (offgoing nurse). Report included the following information SBAR.   2145 Dr. Annemarie Moody called In. Updated on pt. Status. Orders for Procardia 10mg every 6 hours received. 2526 Medications given. See MAR. Pt. States constant lower abdominal pain. Pt. States she had a BM yesterday but feels vaginal pressure. Pt. Is sitting straight up in bed and looks uncomfortable. Warmer prepared for delivery. NICU updated on pt. Status. PIV flushed and patent. 1136 EFM and toco applied. Pt. States she has HA. Pt. Does not want Tylenol. Gingerale and alex crackers given per pt. Request. Consents signed and witnessed in case pt. Labors. 1158 NST reactive. EFM and toco removed.    Õie 16 Dr. Anais Almazan called per RN with results of 24 hour urine

## 2021-05-01 NOTE — PROGRESS NOTES
High Risk Obstetrics Progress Note    Name: Kalyn Washburn MRN: [id number]  SSN: [id number]    YOB: 1995  Age: 30 y.o. Sex: female      Subjective:      LOS: 0 days    Estimated Date of Delivery: 21   Gestational Age Today: 35w7d     Patient admitted for preeclampsia. States she does have normal fetal movement and does not have abdominal pain  , contractions, headache , nausea and vomiting, pelvic pressure, right upper quadrant pain  , swelling, vaginal bleeding , vaginal leaking of fluid  and visual disturbances. Did have a mild HA earlier but it responded to tylenol. Procardia 10 qid started this AM per MFM for contractions. Objective:     Vitals:  Blood pressure 137/81, pulse 77, temperature 98 °F (36.7 °C), resp. rate 17, height 5' 7\" (1.702 m), weight 231 lb (104.8 kg), last menstrual period 2020, currently breastfeeding. Temp (24hrs), Av.2 °F (36.8 °C), Min:98 °F (36.7 °C), Max:98.4 °F (83.1 °C)    Systolic (81XNX), QCK:425 , Min:134 , FLE:027      Diastolic (09YCT), VYH:75, Min:73, Max:83       Intake and Output:     Date 21 0700 - 21 0659   Shift 2455-1859 3170-0225 0747-0956 24 Hour Total   INTAKE   P.O. 240 355  595   Shift Total(mL/kg) 240(2.3) 355(3.4)  595(5.7)   OUTPUT   Urine(mL/kg/hr)  500  500   Shift Total(mL/kg)  500(4.8)  500(4.8)   Weight (kg) 104.8 104.8 104.8 104.8       Physical Exam:  Patient without distress.   A&Ox3  Neuro NL 2-12  Psych grossly NL  Neck supple no thyromegaly  Abdomen: soft, nontender, nondistended, without guarding, without rebound  Lower Extremities:  - Edema 1+   - No evidence of DVT seen on physical exam.   - Patellar Reflexes: 1+ bilaterally   - Clonus: absent       Membranes:  Intact    Uterine Activity:  Very irregular    Fetal Heart Rate:  Reactive  Baseline: 140s per minute  Variability: moderate  Accelerations: yes  Decelerations: none  Uterine contractions: rare        Labs:   Recent Results (from the past 36 hour(s))   PROTEIN, URINE, 24 HR    Collection Time: 04/30/21  3:45 PM   Result Value Ref Range    Period of collection 24 hr    Volume 1,000 mL    Protein, urine random 37 mg/dL    Protein,urine 24 hr 370 mg/24hr   CREATININE PANEL - CRCL, 24 HR    Collection Time: 04/30/21  3:45 PM   Result Value Ref Range    Height 67 INCHES    Weight 231 LB    Period of collection 24 hr    Total volume, urine 1,000 mL    Creatinine, urine 135.00 mg/dL    Creatinine Clearance 103 mL/min    Creatinine, urine 24 hr 1,350 600 - 1,800 MG/24HR   PROTEIN/CREATININE RATIO, URINE    Collection Time: 04/30/21  3:55 PM   Result Value Ref Range    Protein, urine random 116 mg/dL    Creatinine, urine 369.00 mg/dL    Protein/Creat. urine Ratio 0.3     METABOLIC PANEL, COMPREHENSIVE    Collection Time: 04/30/21  4:49 PM   Result Value Ref Range    Sodium 137 136 - 145 mmol/L    Potassium 4.2 3.5 - 5.1 mmol/L    Chloride 107 98 - 107 mmol/L    CO2 24 21 - 32 mmol/L    Anion gap 6 (L) 7 - 16 mmol/L    Glucose 89 65 - 100 mg/dL    BUN 9 6 - 23 MG/DL    Creatinine 0.73 0.6 - 1.0 MG/DL    GFR est AA >60 >60 ml/min/1.73m2    GFR est non-AA >60 >60 ml/min/1.73m2    Calcium 8.7 8.3 - 10.4 MG/DL    Bilirubin, total 0.3 0.2 - 1.1 MG/DL    ALT (SGPT) 23 12 - 65 U/L    AST (SGOT) 35 15 - 37 U/L    Alk.  phosphatase 430 (H) 50 - 130 U/L    Protein, total 6.9 6.3 - 8.2 g/dL    Albumin 2.8 (L) 3.5 - 5.0 g/dL    Globulin 4.1 (H) 2.3 - 3.5 g/dL    A-G Ratio 0.7 (L) 1.2 - 3.5     LD    Collection Time: 04/30/21  4:49 PM   Result Value Ref Range     (H) 100 - 190 U/L   URIC ACID    Collection Time: 04/30/21  4:49 PM   Result Value Ref Range    Uric acid 4.0 2.6 - 6.0 MG/DL   CBC WITH AUTOMATED DIFF    Collection Time: 04/30/21  4:49 PM   Result Value Ref Range    WBC 8.8 4.3 - 11.1 K/uL    RBC 3.24 (L) 4.05 - 5.2 M/uL    HGB 10.4 (L) 11.7 - 15.4 g/dL    HCT 30.7 (L) 35.8 - 46.3 %    MCV 94.8 79.6 - 97.8 FL    MCH 32.1 26.1 - 32.9 PG    MCHC 33.9 31.4 - 35.0 g/dL    RDW 14.7 (H) 11.9 - 14.6 %    PLATELET 275 648 - 598 K/uL    MPV 11.1 9.4 - 12.3 FL    ABSOLUTE NRBC 0.00 0.0 - 0.2 K/uL    DF AUTOMATED      NEUTROPHILS 76 43 - 78 %    LYMPHOCYTES 15 13 - 44 %    MONOCYTES 9 4.0 - 12.0 %    EOSINOPHILS 0 (L) 0.5 - 7.8 %    BASOPHILS 0 0.0 - 2.0 %    IMMATURE GRANULOCYTES 1 0.0 - 5.0 %    ABS. NEUTROPHILS 6.7 1.7 - 8.2 K/UL    ABS. LYMPHOCYTES 1.3 0.5 - 4.6 K/UL    ABS. MONOCYTES 0.8 0.1 - 1.3 K/UL    ABS. EOSINOPHILS 0.0 0.0 - 0.8 K/UL    ABS. BASOPHILS 0.0 0.0 - 0.2 K/UL    ABS. IMM. GRANS. 0.0 0.0 - 0.5 K/UL   TYPE & SCREEN    Collection Time: 04/30/21  4:49 PM   Result Value Ref Range    Crossmatch Expiration 05/03/2021,2359     ABO/Rh(D) A POSITIVE     Antibody screen NEG        Assessment and Plan:      Principal Problem:    Pre-eclampsia in third trimester (4/30/2021)    Active Problems:    Trichomonal vaginitis during pregnancy in first trimester (2/27/2017)      Overview: On NOB pap - repeat with other STDs around 35-36 weeks            11/23/2020: repeat NEG      Multigravida in third trimester (10/26/2020)      Overview: NAOMI 6/7/2020 by LMP c/w 8wk US      11/23/2020 at [facility]: Normal NT and nasal bone. Requests genetic       testing----->  NIPT drawn at [facility] this am.  Genetic counseling done       by genetic counselor.       11/23/2020: CF/SMA neg, NIPT wnl (neg x 3, female)      1/11/2021 at [facility]: Normal anatomy and echo, LOW RISK NIPT, AC 21%,       reassuring cervix      2/22/2021 [facility]: New borderline fetal growth restriction; AC 10%, Overall       29%, DEVON 22cm, Dopplers WNL      3/22/2021: Plans breastfeeding, depo and TDAP recommended      3/25/2021 at [facility]: Improved fetal growth restriction; AC 14%, Overall 31%,       DEVON 25cm,  BPP 8/8      · No F/U MFM, to see as needed      4/12/2021: DEVON 22cm, dopplers nl, vertex      History of pre-eclampsia in prior pregnancy, currently pregnant in third trimester (10/26/2020)      Overview: 10/26/2020: Baseline pre-e labs today, start  mg at 12 weeks      Referral sent to MFM to discuss significant obstetrical hx. Also APA labs       were to be drawn in 2020 after last missed ab but I do not see that       these were drawn. 2020 at [facility]: Check APA labs at next OB visit. Continue 162mg ASA at       this time. 2021 at [facility]: 20 APA labs WNL. /80 today. Pt c/o seeing       \"spots occasionally\" in the past few days. Denies PreE symptoms. · BP log given for pt to check BP next time she is feeling badly. Instructed to call Primary OB or go to hospital if BP >150>110       consistently or she has PreE symptoms      2021 [facility]: BP reassuring but new growth lag; MFM to reassess in 4-6       weeks. 3/25/2021 [facility]: Reassuring maternal and fetal status today. Close       surveillance with OB            2021: BP nl, urine trace protein      Given hx of preeclampsia and swelling/weight gain, pt to hospital for STAT       pre-E labs and venous dopplers      Work note given for 1 week      Herpes virus infection in mother during third trimester of pregnancy (10/26/2020)      Overview: Outbreak reported at 15 weeks - plan propho for remainder of preg      2021 at [facility]: Takes Valtrex 500mg BID            3/9/2021: ?outbreak vs. Injury from waxing, cx pending      Cx POS for herpes @ 27 weeks      3/25/2021 at [facility]: Denies current outbreak. History of pregnancy loss in prior pregnancy, currently pregnant in third trimester (10/26/2020)      Overview: 2014  at 19 weeks.  Per pt, due to placental abruption      2020 at [facility]: unclear need for progesterone supplementation as this       was a [facility] complicated pregnancy      Pregnancy complication, habitual aborter, third trimester (10/26/2020)      Overview: Hx 4 SABs      2021 at [facility]: APA workup WNL                  Poor fetal growth, affecting management of mother, antepartum condition or complication (6826)      Overview: 2021 [facility]: New borderline fetal growth restriction; AC 10%, Overall       29%, DEVON 22cm, Dopplers WNL      3/25/2021 at [facility]: Improved fetal growth restriction; AC 14%, Overall 31%,       DEVON 25cm,  BPP 8/8      Gestational hypertension, third trimester (2021)      Overview: 2021: Pt here today for US only visit BPP 8/8, doppler snl      /80, Denies HA, vision changes, RUQ/epigastric pain, or N/V.       21:  BP elevated again today confirming Dx       labor in third trimester (2021)      34 weeks gestation of pregnancy (2021)       Preeclampsia:  mild  Complete 48 hour course of steroids to promote fetal lung maturity. Daily Fetal monitoring with Non-stress tests  Ultrasound for evaluation of fetal growth and well being scheduled to be done in next 24 hours. Deliver for severe or HELLP ndrome, fetal nonreasurrance or worsening maternal condition.   Repeat labs in AM

## 2021-05-01 NOTE — PROGRESS NOTES
Pt awake, rates pain 4/10. Pt states all pain is in her back. Pt looks very uncomfortable in bed. Pt denies contractions or cramping. Pt still seems very hesitant to share information about how she is feeling. Cramping palpated. Explained to pt about high risk status and that she needs to be honest about how she is feeling and if she is yessenia, pt verbalizes understanding and still denies any contractions. Requested to do sve and put pt on monitor, pt refuses both. Will continue to monitor.

## 2021-05-01 NOTE — PROGRESS NOTES
Impression: Pinguecula, bilateral: H11.153. OU.  Plan: recommended pt to wear sunglasses anytime she is outside and using artificial tears to keep eyes moist. LILY from SAMUEL Amanda RN

## 2021-05-01 NOTE — PROGRESS NOTES
Pt found sleeping. Pt awakened when I entered room. Pt states she is still having the pain in her back and an occasional  headache, rates pain 4/10 just like before. Vitals WNL. Pt denies any contractions or needs at this time.

## 2021-05-02 ENCOUNTER — ANESTHESIA EVENT (OUTPATIENT)
Dept: ANTEPARTUM | Age: 26
DRG: 560 | End: 2021-05-02
Payer: COMMERCIAL

## 2021-05-02 ENCOUNTER — ANESTHESIA (OUTPATIENT)
Dept: ANTEPARTUM | Age: 26
DRG: 560 | End: 2021-05-02
Payer: COMMERCIAL

## 2021-05-02 PROBLEM — E87.6 HYPOKALEMIA: Status: ACTIVE | Noted: 2021-05-02

## 2021-05-02 LAB
ALBUMIN SERPL-MCNC: 2.3 G/DL (ref 3.5–5)
ALBUMIN/GLOB SERPL: 0.7 {RATIO} (ref 1.2–3.5)
ALP SERPL-CCNC: 314 U/L (ref 50–136)
ALT SERPL-CCNC: 17 U/L (ref 12–65)
ANION GAP SERPL CALC-SCNC: 2 MMOL/L (ref 7–16)
AST SERPL-CCNC: 23 U/L (ref 15–37)
BASE DEFICIT BLD-SCNC: 6.2 MMOL/L
BASE DEFICIT BLD-SCNC: 6.6 MMOL/L
BILIRUB SERPL-MCNC: 0.2 MG/DL (ref 0.2–1.1)
BUN SERPL-MCNC: 10 MG/DL (ref 6–23)
CALCIUM SERPL-MCNC: 8.4 MG/DL (ref 8.3–10.4)
CHLORIDE SERPL-SCNC: 110 MMOL/L (ref 98–107)
CO2 SERPL-SCNC: 25 MMOL/L (ref 21–32)
CREAT SERPL-MCNC: 0.57 MG/DL (ref 0.6–1)
ERYTHROCYTE [DISTWIDTH] IN BLOOD BY AUTOMATED COUNT: 14.6 % (ref 11.9–14.6)
GLOBULIN SER CALC-MCNC: 3.5 G/DL (ref 2.3–3.5)
GLUCOSE SERPL-MCNC: 81 MG/DL (ref 65–100)
HCO3 BLD-SCNC: 23.1 MMOL/L (ref 22–26)
HCO3 BLDV-SCNC: 21.7 MMOL/L (ref 23–28)
HCT VFR BLD AUTO: 27.1 % (ref 35.8–46.3)
HGB BLD-MCNC: 9.2 G/DL (ref 11.7–15.4)
LDH SERPL L TO P-CCNC: 231 U/L (ref 100–190)
MCH RBC QN AUTO: 31.6 PG (ref 26.1–32.9)
MCHC RBC AUTO-ENTMCNC: 33.9 G/DL (ref 31.4–35)
MCV RBC AUTO: 93.1 FL (ref 79.6–97.8)
NRBC # BLD: 0.08 K/UL (ref 0–0.2)
PCO2 BLDCO: 50 MMHG (ref 32–68)
PCO2 BLDCO: 61 MMHG (ref 32–68)
PH BLDCO: 7.19 [PH] (ref 7.15–7.38)
PH BLDCO: 7.25 [PH] (ref 7.15–7.38)
PLATELET # BLD AUTO: 179 K/UL (ref 150–450)
PMV BLD AUTO: 10.9 FL (ref 9.4–12.3)
PO2 BLDCO: 11 MMHG
PO2 BLDCO: <5 MMHG
POTASSIUM SERPL-SCNC: 3.9 MMOL/L (ref 3.5–5.1)
PROT SERPL-MCNC: 5.8 G/DL (ref 6.3–8.2)
RBC # BLD AUTO: 2.91 M/UL (ref 4.05–5.2)
SAO2 % BLDV: 8.3 % (ref 65–88)
SERVICE CMNT-IMP: ABNORMAL
SERVICE CMNT-IMP: NORMAL
SODIUM SERPL-SCNC: 137 MMOL/L (ref 136–145)
SPECIMEN TYPE: ABNORMAL
SPECIMEN TYPE: NORMAL
URATE SERPL-MCNC: 4 MG/DL (ref 2.6–6)
WBC # BLD AUTO: 5.8 K/UL (ref 4.3–11.1)

## 2021-05-02 PROCEDURE — 74011000250 HC RX REV CODE- 250: Performed by: NURSE ANESTHETIST, CERTIFIED REGISTERED

## 2021-05-02 PROCEDURE — 75410000002 HC LABOR FEE PER 1 HR: Performed by: OBSTETRICS & GYNECOLOGY

## 2021-05-02 PROCEDURE — 77030018846 HC SOL IRR STRL H20 ICUM -A

## 2021-05-02 PROCEDURE — 83615 LACTATE (LD) (LDH) ENZYME: CPT

## 2021-05-02 PROCEDURE — 75410000003 HC RECOV DEL/VAG/CSECN EA 0.5 HR: Performed by: OBSTETRICS & GYNECOLOGY

## 2021-05-02 PROCEDURE — 76060000078 HC EPIDURAL ANESTHESIA: Performed by: OBSTETRICS & GYNECOLOGY

## 2021-05-02 PROCEDURE — 77030002888 HC SUT CHRMC J&J -A

## 2021-05-02 PROCEDURE — 65270000029 HC RM PRIVATE

## 2021-05-02 PROCEDURE — 84550 ASSAY OF BLOOD/URIC ACID: CPT

## 2021-05-02 PROCEDURE — 59409 OBSTETRICAL CARE: CPT | Performed by: OBSTETRICS & GYNECOLOGY

## 2021-05-02 PROCEDURE — 77030019905 HC CATH URETH INTMIT MDII -A

## 2021-05-02 PROCEDURE — 74011250636 HC RX REV CODE- 250/636

## 2021-05-02 PROCEDURE — 36415 COLL VENOUS BLD VENIPUNCTURE: CPT

## 2021-05-02 PROCEDURE — 74011000258 HC RX REV CODE- 258: Performed by: OBSTETRICS & GYNECOLOGY

## 2021-05-02 PROCEDURE — 82803 BLOOD GASES ANY COMBINATION: CPT

## 2021-05-02 PROCEDURE — 88307 TISSUE EXAM BY PATHOLOGIST: CPT

## 2021-05-02 PROCEDURE — 80053 COMPREHEN METABOLIC PANEL: CPT

## 2021-05-02 PROCEDURE — 85027 COMPLETE CBC AUTOMATED: CPT

## 2021-05-02 PROCEDURE — 74011250637 HC RX REV CODE- 250/637: Performed by: OBSTETRICS & GYNECOLOGY

## 2021-05-02 PROCEDURE — 75410000000 HC DELIVERY VAGINAL/SINGLE: Performed by: OBSTETRICS & GYNECOLOGY

## 2021-05-02 PROCEDURE — 3E0S3BZ INTRODUCTION OF ANESTHETIC AGENT INTO EPIDURAL SPACE, PERCUTANEOUS APPROACH: ICD-10-PCS | Performed by: ANESTHESIOLOGY

## 2021-05-02 PROCEDURE — 10907ZC DRAINAGE OF AMNIOTIC FLUID, THERAPEUTIC FROM PRODUCTS OF CONCEPTION, VIA NATURAL OR ARTIFICIAL OPENING: ICD-10-PCS | Performed by: OBSTETRICS & GYNECOLOGY

## 2021-05-02 PROCEDURE — A4300 CATH IMPL VASC ACCESS PORTAL: HCPCS | Performed by: NURSE ANESTHETIST, CERTIFIED REGISTERED

## 2021-05-02 PROCEDURE — 77030014125 HC TY EPDRL BBMI -B: Performed by: NURSE ANESTHETIST, CERTIFIED REGISTERED

## 2021-05-02 PROCEDURE — 74011250636 HC RX REV CODE- 250/636: Performed by: NURSE ANESTHETIST, CERTIFIED REGISTERED

## 2021-05-02 PROCEDURE — 2709999900 HC NON-CHARGEABLE SUPPLY

## 2021-05-02 PROCEDURE — 74011250636 HC RX REV CODE- 250/636: Performed by: OBSTETRICS & GYNECOLOGY

## 2021-05-02 RX ORDER — ROPIVACAINE HYDROCHLORIDE 2 MG/ML
INJECTION, SOLUTION EPIDURAL; INFILTRATION; PERINEURAL
Status: DISCONTINUED | OUTPATIENT
Start: 2021-05-02 | End: 2021-05-02 | Stop reason: HOSPADM

## 2021-05-02 RX ORDER — OXYCODONE HYDROCHLORIDE 5 MG/1
5 TABLET ORAL
Status: DISCONTINUED | OUTPATIENT
Start: 2021-05-02 | End: 2021-05-05 | Stop reason: HOSPADM

## 2021-05-02 RX ORDER — LIDOCAINE HYDROCHLORIDE AND EPINEPHRINE 15; 5 MG/ML; UG/ML
INJECTION, SOLUTION EPIDURAL AS NEEDED
Status: DISCONTINUED | OUTPATIENT
Start: 2021-05-02 | End: 2021-05-02 | Stop reason: HOSPADM

## 2021-05-02 RX ORDER — SIMETHICONE 80 MG
80 TABLET,CHEWABLE ORAL
Status: DISCONTINUED | OUTPATIENT
Start: 2021-05-02 | End: 2021-05-05 | Stop reason: HOSPADM

## 2021-05-02 RX ORDER — DIPHENHYDRAMINE HCL 25 MG
25 CAPSULE ORAL
Status: DISCONTINUED | OUTPATIENT
Start: 2021-05-02 | End: 2021-05-05 | Stop reason: HOSPADM

## 2021-05-02 RX ORDER — ACETAMINOPHEN 500 MG
1000 TABLET ORAL
Status: DISCONTINUED | OUTPATIENT
Start: 2021-05-02 | End: 2021-05-05 | Stop reason: HOSPADM

## 2021-05-02 RX ORDER — OXYTOCIN/RINGER'S LACTATE 30/500 ML
87.3 PLASTIC BAG, INJECTION (ML) INTRAVENOUS AS NEEDED
Status: COMPLETED | OUTPATIENT
Start: 2021-05-02 | End: 2021-05-02

## 2021-05-02 RX ORDER — FAMOTIDINE 20 MG/1
20 TABLET, FILM COATED ORAL ONCE
Status: ACTIVE | OUTPATIENT
Start: 2021-05-02 | End: 2021-05-03

## 2021-05-02 RX ORDER — OXYCODONE HYDROCHLORIDE 5 MG/1
10 TABLET ORAL
Status: DISCONTINUED | OUTPATIENT
Start: 2021-05-02 | End: 2021-05-05 | Stop reason: HOSPADM

## 2021-05-02 RX ORDER — DOCUSATE SODIUM 100 MG/1
100 CAPSULE, LIQUID FILLED ORAL 2 TIMES DAILY
Status: DISCONTINUED | OUTPATIENT
Start: 2021-05-02 | End: 2021-05-05 | Stop reason: HOSPADM

## 2021-05-02 RX ORDER — IBUPROFEN 800 MG/1
800 TABLET ORAL
Status: DISCONTINUED | OUTPATIENT
Start: 2021-05-02 | End: 2021-05-05 | Stop reason: HOSPADM

## 2021-05-02 RX ORDER — TERBUTALINE SULFATE 1 MG/ML
INJECTION SUBCUTANEOUS
Status: COMPLETED
Start: 2021-05-02 | End: 2021-05-02

## 2021-05-02 RX ORDER — LOPERAMIDE HYDROCHLORIDE 2 MG/1
2 CAPSULE ORAL AS NEEDED
Status: DISCONTINUED | OUTPATIENT
Start: 2021-05-02 | End: 2021-05-05 | Stop reason: HOSPADM

## 2021-05-02 RX ORDER — FAMOTIDINE 20 MG/1
20 TABLET, FILM COATED ORAL 2 TIMES DAILY
Status: DISCONTINUED | OUTPATIENT
Start: 2021-05-03 | End: 2021-05-05 | Stop reason: HOSPADM

## 2021-05-02 RX ORDER — SODIUM CHLORIDE 0.9 % (FLUSH) 0.9 %
5-40 SYRINGE (ML) INJECTION EVERY 8 HOURS
Status: DISCONTINUED | OUTPATIENT
Start: 2021-05-02 | End: 2021-05-03

## 2021-05-02 RX ORDER — ZOLPIDEM TARTRATE 5 MG/1
5 TABLET ORAL
Status: DISCONTINUED | OUTPATIENT
Start: 2021-05-02 | End: 2021-05-05 | Stop reason: HOSPADM

## 2021-05-02 RX ORDER — OXYTOCIN/RINGER'S LACTATE 30/500 ML
0-20 PLASTIC BAG, INJECTION (ML) INTRAVENOUS
Status: DISCONTINUED | OUTPATIENT
Start: 2021-05-02 | End: 2021-05-03

## 2021-05-02 RX ORDER — ONDANSETRON 4 MG/1
4 TABLET, ORALLY DISINTEGRATING ORAL
Status: DISCONTINUED | OUTPATIENT
Start: 2021-05-02 | End: 2021-05-05 | Stop reason: HOSPADM

## 2021-05-02 RX ORDER — SODIUM CHLORIDE 0.9 % (FLUSH) 0.9 %
5-40 SYRINGE (ML) INJECTION AS NEEDED
Status: DISCONTINUED | OUTPATIENT
Start: 2021-05-02 | End: 2021-05-03

## 2021-05-02 RX ORDER — OXYTOCIN/RINGER'S LACTATE 30/500 ML
10 PLASTIC BAG, INJECTION (ML) INTRAVENOUS AS NEEDED
Status: DISCONTINUED | OUTPATIENT
Start: 2021-05-02 | End: 2021-05-05 | Stop reason: HOSPADM

## 2021-05-02 RX ADMIN — Medication 87.3 MILLI-UNITS/MIN: at 19:14

## 2021-05-02 RX ADMIN — TERBUTALINE SULFATE: 1 INJECTION SUBCUTANEOUS at 16:45

## 2021-05-02 RX ADMIN — NIFEDIPINE 10 MG: 10 CAPSULE ORAL at 01:53

## 2021-05-02 RX ADMIN — NIFEDIPINE 10 MG: 10 CAPSULE ORAL at 20:20

## 2021-05-02 RX ADMIN — IBUPROFEN 800 MG: 800 TABLET, FILM COATED ORAL at 20:21

## 2021-05-02 RX ADMIN — NIFEDIPINE 10 MG: 10 CAPSULE ORAL at 13:59

## 2021-05-02 RX ADMIN — VALACYCLOVIR HYDROCHLORIDE 500 MG: 500 TABLET, FILM COATED ORAL at 09:14

## 2021-05-02 RX ADMIN — ROPIVACAINE HYDROCHLORIDE 10 ML/HR: 2 INJECTION, SOLUTION EPIDURAL; INFILTRATION at 17:56

## 2021-05-02 RX ADMIN — VALACYCLOVIR HYDROCHLORIDE 500 MG: 500 TABLET, FILM COATED ORAL at 20:20

## 2021-05-02 RX ADMIN — LIDOCAINE HYDROCHLORIDE,EPINEPHRINE BITARTRATE 5 ML: 15; .005 INJECTION, SOLUTION EPIDURAL; INFILTRATION; INTRACAUDAL; PERINEURAL at 17:52

## 2021-05-02 RX ADMIN — SODIUM CHLORIDE 5 MILLION UNITS: 900 INJECTION INTRAVENOUS at 17:00

## 2021-05-02 RX ADMIN — NIFEDIPINE 10 MG: 10 CAPSULE ORAL at 09:14

## 2021-05-02 RX ADMIN — Medication 10 ML: at 09:19

## 2021-05-02 NOTE — ANESTHESIA PROCEDURE NOTES
Epidural Block    Patient location during procedure: OB  Start time: 5/2/2021 3:56 PM  End time: 5/2/2021 3:42 PM  Reason for block: labor epidural  Staffing  Performed: attending   Anesthesiologist: Sandy Torres MD  Preanesthetic Checklist  Completed: patient identified, risks and benefits discussed, surgical consent, pre-op evaluation and timeout performed  Block Placement  Patient position: sitting  Prep: ChloraPrep  Sterility prep: cap, drape, gloves, hand and mask  Sedation level: no sedation  Patient monitoring: continuous pulse oximetry and heart rate  Approach: midline  Location: lumbar  Lumbar location: L3-L4  Epidural  Guidance: landmark technique  Needle  Needle type: Tuohy   Needle gauge: 17 G  Needle length: 10 cm  Catheter type: end hole  Catheter size: 19 G  Catheter securement method: clear occlusive dressing, liquid medical adhesive and surgical tape  Test dose: negative  Assessment  Number of attempts: 1  Procedure assessment: patient tolerated procedure well with no complications

## 2021-05-02 NOTE — ANESTHESIA PREPROCEDURE EVALUATION
Relevant Problems   No relevant active problems       Anesthetic History               Review of Systems / Medical History  Patient summary reviewed, nursing notes reviewed and pertinent labs reviewed    Pulmonary                   Neuro/Psych              Cardiovascular    Hypertension              Exercise tolerance: >4 METS  Comments: PIh   GI/Hepatic/Renal                Endo/Other             Other Findings              Physical Exam    Airway  Mallampati: II  TM Distance: 4 - 6 cm  Neck ROM: normal range of motion   Mouth opening: Normal     Cardiovascular  Regular rate and rhythm,  S1 and S2 normal,  no murmur, click, rub, or gallop             Dental  No notable dental hx       Pulmonary  Breath sounds clear to auscultation               Abdominal         Other Findings            Anesthetic Plan    ASA: 3  Anesthesia type: epidural          Induction: Intravenous  Anesthetic plan and risks discussed with: Patient

## 2021-05-02 NOTE — PROGRESS NOTES
Pt states she is feeling UC.  EFM and Conner applied. UC and repetitive variable and late decelerations noted. MD notified and interventions done per flow sheet data.

## 2021-05-02 NOTE — PROGRESS NOTES
CTSP patient d/t increase in contractions, but now having deep repetitive variable decels. States contractions more intense than prior. Last procardia at about 2pm.    ON EFM UCs about q 3-4 minutes. Brethine had been ordered and given. Exam: A&Ox3, NAD  Abd soft, NT. Perineum - no fluid or blood, no lesions (has h/o HSV)  Cvx 4/90/-2. Findings discussed, rec proceeding with delivery. AROM - clear fluid. A/p Proceed with delivery. Discussed that may possibly need  if FHR indicated. Procedure discussed. Will start PCN then bolus for epidural, and start pitocin as needed. MFM notified and agrees.

## 2021-05-02 NOTE — PROGRESS NOTES
05/02/21 1651   Membranes   Membrane Status AROM   Sac Identifier Sac 1   Rupture Date 05/02/21   Rupture Time 1651   Amniotic Fluid Description Clear   Amniotic Fluid Volume  Moderate   Cervical Exam   Dilation (cm) 4   Eff 75 %   Station -2   Vaginal exam done byCaro Wise at bedside. Reviewed graph. SVE and AROM. Tolerated procedures well. Discussed possibility of C/S if fetal distress continues.

## 2021-05-02 NOTE — L&D DELIVERY NOTE
Delivery Summary    Patient: Argenis Carrillo MRN: [id number]  SSN: [id number]    YOB: 1995  Age: 30 y.o. Sex: female       Information for the patient's :  Flynn Swan [[id number]]       Labor Events:    Labor: Yes    Steroids: Full Course   Cervical Ripening Date/Time:       Cervical Ripening Type: None   Antibiotics During Labor:     Rupture Identifier:      Rupture Date/Time: 2021 4:51 PM   Rupture Type: AROM   Amniotic Fluid Volume:      Amniotic Fluid Description: Clear    Amniotic Fluid Odor:      Induction:         Induction Date/Time: 2021      Indications for Induction:      Augmentation: AROM   Augmentation Date/Time:      Indications for Augmentation: Fetal Heart Rate or Rhythm Abnormality   Labor complications: Additional complications:        Delivery Events:  Indications For Episiotomy:     Episiotomy: None   Perineal Laceration(s): None   Repaired:     Periurethral Laceration Location: bilateral    Repaired: Yes   Labial Laceration Location:     Repaired:     Sulcal Laceration Location:     Repaired:     Vaginal Laceration Location:     Repaired:     Cervical Laceration Location:     Repaired:     Repair Suture: Chromic 3-0   Number of Repair Packets: 1   Estimated Blood Loss (ml):  ml   Quantitative Blood Loss (ml)                Delivery Date: 2021    Delivery Time: 6:32 PM  Delivery Type: Vaginal, Spontaneous  Sex:  Female    Gestational Age: 34w6d   Delivery Clinician:  Stafford Severs  Living Status: Living   Delivery Location: L&D            APGARS  One minute Five minutes Ten minutes   Skin color:            Heart rate:            Grimace:            Muscle tone:            Breathing: Totals:                Presentation: Vertex    Position:   Occiput Anterior  Resuscitation Method:  Suctioning-bulb; Tactile Stimulation     Meconium Stained: None      Cord Information: 3 Vessels  Complications: None  Cord around:    Delayed cord clamping? Yes  Cord clamped date/time:2021  6:33 PM  Disposition of Cord Blood: Lab    Blood Gases Sent?: Yes    Placenta:  Date/Time: 2021  6:36 PM  Removal: Spontaneous      Appearance: Intact; Other (comment)      Measurements:  Birth Weight:        Birth Length:        Head Circumference:        Chest Circumference:       Abdominal Girth: Other Providers:   Taj Boxer, Obstetrician;Primary Nurse;Primary [place] Nurse;Nicu Nurse;Neonatologist;Anesthesiologist;Crna;Nurse Practitioner;Midwife;Nursery Nurse           Group B Strep: No results found for: GRBSEXT, GRBSEXT  Information for the patient's :  Stacie Ronquillo [[id number]]   No results found for: ABORH, PCTABR, PCTDIG, BILI, ABORHEXT, ABORH     No results for input(s): PCO2CB, PO2CB, HCO3I, SO2I, IBD, PTEMPI, SPECTI, PHICB, ISITE, IDEV, IALLEN in the last 72 hours.

## 2021-05-02 NOTE — ANESTHESIA POSTPROCEDURE EVALUATION
* No procedures listed *. No value filed.     Anesthesia Post Evaluation      Multimodal analgesia: multimodal analgesia used between 6 hours prior to anesthesia start to PACU discharge  Patient location during evaluation: PACU  Patient participation: complete - patient participated  Level of consciousness: awake and awake and alert  Pain management: adequate  Airway patency: patent  Anesthetic complications: no  Cardiovascular status: acceptable  Respiratory status: acceptable  Hydration status: acceptable  Post anesthesia nausea and vomiting:  controlled      INITIAL Post-op Vital signs:   Vitals Value Taken Time   /74 05/02/21 1906   Temp     Pulse 85 05/02/21 1906   Resp 14 05/02/21 1906   SpO2

## 2021-05-02 NOTE — PROGRESS NOTES
Pt sleeping, awakens to verbal command. Vitals WNL. Procardia given. Pt denies any needs at this time.

## 2021-05-03 PROCEDURE — 74011250637 HC RX REV CODE- 250/637: Performed by: OBSTETRICS & GYNECOLOGY

## 2021-05-03 PROCEDURE — 65270000029 HC RM PRIVATE

## 2021-05-03 PROCEDURE — 2709999900 HC NON-CHARGEABLE SUPPLY

## 2021-05-03 RX ADMIN — IBUPROFEN 800 MG: 800 TABLET, FILM COATED ORAL at 20:35

## 2021-05-03 RX ADMIN — NIFEDIPINE 10 MG: 10 CAPSULE ORAL at 08:20

## 2021-05-03 RX ADMIN — NIFEDIPINE 10 MG: 10 CAPSULE ORAL at 14:28

## 2021-05-03 RX ADMIN — VALACYCLOVIR HYDROCHLORIDE 500 MG: 500 TABLET, FILM COATED ORAL at 08:19

## 2021-05-03 RX ADMIN — VALACYCLOVIR HYDROCHLORIDE 500 MG: 500 TABLET, FILM COATED ORAL at 20:35

## 2021-05-03 RX ADMIN — FAMOTIDINE 20 MG: 20 TABLET ORAL at 08:19

## 2021-05-03 RX ADMIN — IBUPROFEN 800 MG: 800 TABLET, FILM COATED ORAL at 09:19

## 2021-05-03 RX ADMIN — DOCUSATE SODIUM 100 MG: 100 CAPSULE, LIQUID FILLED ORAL at 08:21

## 2021-05-03 RX ADMIN — FAMOTIDINE 20 MG: 20 TABLET ORAL at 18:40

## 2021-05-03 RX ADMIN — ZOLPIDEM TARTRATE 5 MG: 5 TABLET ORAL at 20:35

## 2021-05-03 RX ADMIN — DOCUSATE SODIUM 100 MG: 100 CAPSULE, LIQUID FILLED ORAL at 18:40

## 2021-05-03 RX ADMIN — IBUPROFEN 800 MG: 800 TABLET, FILM COATED ORAL at 14:28

## 2021-05-03 RX ADMIN — OXYCODONE 10 MG: 5 TABLET ORAL at 16:54

## 2021-05-03 RX ADMIN — HYDROCODONE BITARTRATE AND ACETAMINOPHEN 1 TABLET: 5; 325 TABLET ORAL at 08:19

## 2021-05-03 RX ADMIN — NIFEDIPINE 10 MG: 10 CAPSULE ORAL at 20:35

## 2021-05-03 RX ADMIN — NIFEDIPINE 10 MG: 10 CAPSULE ORAL at 02:27

## 2021-05-03 RX ADMIN — IBUPROFEN 800 MG: 800 TABLET, FILM COATED ORAL at 02:27

## 2021-05-03 NOTE — PROGRESS NOTES
SBAR OUT Report: Mother    Verbal report given to Ronaldo Manuel RN (full name & credentials) on this patient, who is now being transferred to MIU (unit) for routine progression of care. The patient is not wearing a green \"Anesthesia-Duramorph\" band. Report consisted of patient's Situation, Background, Assessment and Recommendations (SBAR).  ID bands were compared with the identification form, and verified with the patient and receiving nurse. Information from the SBAR and the [address] [facility] Report was reviewed with the receiving nurse; opportunity for questions and clarification provided.

## 2021-05-03 NOTE — PROGRESS NOTES
SBAR IN Report: Mother    Verbal report received from Annabelle De La Paz RN (full name & credentials) on this patient, who is now being transferred from L&D (unit) for routine progression of care. The patient is not wearing a green \"Anesthesia-Duramorph\" band. Report consisted of patient's Situation, Background, Assessment and Recommendations (SBAR). Information from the SBAR and the [facility] Report was reviewed with the transferring nurse; opportunity for questions and clarification provided.

## 2021-05-03 NOTE — PROGRESS NOTES
Chart reviewed - baby girl in NICU (34.6). SW met with mother while social distancing w/appropriate PPE. Shoshone's name is Toney Ruth. Patient states that she's coping well with baby's early arrival and need to be in the NICU. She had a previous 30w daughter in 2019 who is now thriving. Emotional support offered by . Patient states that she has reliable transportation to/from hospital.      Patient denies any history of postpartum depression/anxiety. Patient given informational packet on  mood & anxiety disorders (resources/education). Family denies any additional needs from  at this time. Family has 's contact information should any needs/questions arise.     HALIMA Garcia-DEE  [address] [facility]   [phone]

## 2021-05-03 NOTE — LACTATION NOTE
In to see mom for the first time. She stated that she has pumped twice now and she expressed 3 ml at first pumping and then 1 ml. Informed her this is normal and not to get discouraged. Encouraged her to pump at least every 3 hours and to know that the volume could decrease more before it picks up. Informed her that as she feels like it she can pump at infant's bedside with her pump kit. Mom has a personal breast pump at home and she plans on having her  bring it in so she can get demonstration and use it prior to discharge to home. Lactation consultant to follow up tomorrow.

## 2021-05-03 NOTE — PROGRESS NOTES
Patient up to bathroom with partial assistance. Harini-care taught and completed. Questions encouraged and answered. Patient back to bed, encouraged to call for needs or concerns. Verbalizes understanding.

## 2021-05-04 PROCEDURE — 74011250637 HC RX REV CODE- 250/637: Performed by: OBSTETRICS & GYNECOLOGY

## 2021-05-04 PROCEDURE — 2709999900 HC NON-CHARGEABLE SUPPLY

## 2021-05-04 PROCEDURE — 65270000029 HC RM PRIVATE

## 2021-05-04 RX ORDER — ESCITALOPRAM OXALATE 10 MG/1
10 TABLET ORAL DAILY
Status: DISCONTINUED | OUTPATIENT
Start: 2021-05-04 | End: 2021-05-05 | Stop reason: HOSPADM

## 2021-05-04 RX ORDER — NIFEDIPINE 30 MG/1
30 TABLET, EXTENDED RELEASE ORAL EVERY 12 HOURS
Status: DISCONTINUED | OUTPATIENT
Start: 2021-05-04 | End: 2021-05-04

## 2021-05-04 RX ORDER — FUROSEMIDE 20 MG/1
20 TABLET ORAL DAILY
Status: DISCONTINUED | OUTPATIENT
Start: 2021-05-04 | End: 2021-05-05 | Stop reason: HOSPADM

## 2021-05-04 RX ORDER — NIFEDIPINE 30 MG/1
30 TABLET, EXTENDED RELEASE ORAL EVERY 12 HOURS
Status: DISCONTINUED | OUTPATIENT
Start: 2021-05-04 | End: 2021-05-05 | Stop reason: HOSPADM

## 2021-05-04 RX ADMIN — NIFEDIPINE 10 MG: 10 CAPSULE ORAL at 09:13

## 2021-05-04 RX ADMIN — NIFEDIPINE 10 MG: 10 CAPSULE ORAL at 02:57

## 2021-05-04 RX ADMIN — FAMOTIDINE 20 MG: 20 TABLET ORAL at 09:13

## 2021-05-04 RX ADMIN — IBUPROFEN 800 MG: 800 TABLET, FILM COATED ORAL at 15:37

## 2021-05-04 RX ADMIN — OXYCODONE 10 MG: 5 TABLET ORAL at 06:08

## 2021-05-04 RX ADMIN — VALACYCLOVIR HYDROCHLORIDE 500 MG: 500 TABLET, FILM COATED ORAL at 20:23

## 2021-05-04 RX ADMIN — VALACYCLOVIR HYDROCHLORIDE 500 MG: 500 TABLET, FILM COATED ORAL at 09:14

## 2021-05-04 RX ADMIN — OXYCODONE 10 MG: 5 TABLET ORAL at 13:57

## 2021-05-04 RX ADMIN — IBUPROFEN 800 MG: 800 TABLET, FILM COATED ORAL at 02:57

## 2021-05-04 RX ADMIN — IBUPROFEN 800 MG: 800 TABLET, FILM COATED ORAL at 09:14

## 2021-05-04 RX ADMIN — ZOLPIDEM TARTRATE 5 MG: 5 TABLET ORAL at 20:31

## 2021-05-04 RX ADMIN — DOCUSATE SODIUM 100 MG: 100 CAPSULE, LIQUID FILLED ORAL at 09:14

## 2021-05-04 RX ADMIN — NIFEDIPINE 30 MG: 30 TABLET, EXTENDED RELEASE ORAL at 20:23

## 2021-05-04 RX ADMIN — IBUPROFEN 800 MG: 800 TABLET, FILM COATED ORAL at 20:37

## 2021-05-04 RX ADMIN — DOCUSATE SODIUM 100 MG: 100 CAPSULE, LIQUID FILLED ORAL at 20:23

## 2021-05-04 RX ADMIN — FUROSEMIDE 20 MG: 20 TABLET ORAL at 11:18

## 2021-05-04 RX ADMIN — ESCITALOPRAM OXALATE 10 MG: 10 TABLET ORAL at 11:17

## 2021-05-04 NOTE — PROGRESS NOTES
Post-Partum Day Number 1 Progress Note    Patient doing well post-partum without significant complaint. Voiding without difficulty, normal lochia. Vitals:    Patient Vitals for the past 8 hrs:   BP Temp Pulse Resp SpO2   21 135/71 98.2 °F (36.8 °C) 82 17 97 %     Temp (24hrs), Av.1 °F (36.7 °C), Min:98.1 °F (36.7 °C), Max:98.2 °F (36.8 °C)      Vital signs stable, afebrile. Exam:  Patient without distress. Abdomen soft, fundus firm at level of umbilicus, nontender               Lower extremities are negative for significant pitting edema, cords or tenderness. Lab/Data Review: All lab results for the last 24 hours reviewed. Assessment and Plan:  Patient appears to be having uncomplicated post-partum course. Continue routine perineal care and maternal education. Plan discharge tomorrow if no problems occur.

## 2021-05-04 NOTE — PROGRESS NOTES
05/04/21 0608   Pain 1   Pain Scale 1 Numeric (0 - 10)   Pain Intensity 1 8   Pain Location 1 Abdomen;Perineum;Back   Pain Orientation 1 Lower   Pain Description 1 Cramping;Sore;Aching   Pain Intervention(s) 1 Medication (see MAR)     Roxicodone 10mg given for severe pain per pt request.

## 2021-05-04 NOTE — LACTATION NOTE
Spoke with mom briefly in the zamora. She continues to pump and her volume is starting to increase. She was on her way to the nursery to see infant and then pump at her bedside. Lactation consultant will follow up tomorrow.

## 2021-05-04 NOTE — PROGRESS NOTES
Progress Note                               Patient: Oral Love MRN: [id number]  SSN: [id number]    YOB: 1995  Age: 30 y.o. Sex: female      Postpartum Day Number 2    Subjective:     She is doing well. Had a headache this morning which was helped by pain meds. Patient reports normal lochia. . Breastfeeding: Yes . She is experiencing some anxiety and possibly some depression with baby in NICU. Worried about postpartum depression. Objective:     Patient Vitals for the past 24 hrs:   Temp Pulse Resp BP SpO2   21 0913 97.7 °F (36.5 °C) 69  133/83    21 0256 98 °F (36.7 °C) 74 14 139/80 96 %   21 0030 98.2 °F (36.8 °C) 78 16 125/76 97 %   21 2000 98.2 °F (36.8 °C) 82 17 135/71 97 %   21 1435 98.1 °F (36.7 °C)  18  100 %   21 1428  76  (!) 148/87    21 1150  83  (!) 153/88           Temp (24hrs), Av °F (36.7 °C), Min:97.7 °F (36.5 °C), Max:98.2 °F (36.8 °C)    Date 21 07 - 21 0659 21 07 - 21 0659   Shift 9120-2063 1499-9031 24 Hour Total 1516-6115 7640-3395 24 Hour Total   INTAKE   I.V.(mL/kg/hr)  1400(1.1) 1400(0.6)        I.V.  1400 1400      Shift Total(mL/kg)  1400(13.4) 1400(13.4)      OUTPUT   Urine(mL/kg/hr) 1200(1) 3550(2.8) 4750(1.9)        Urine Voided 1200 3550 4750      Shift Total(mL/kg) 1200(11.5) 3550(33.9) 4750(45.3)      NET -1200 -2150 -3350      Weight (kg) 104.8 104.8 104.8 104.8 104.8 104.8         Physical Exam:    Patient without distress.   Abdomen: soft, nontender  Fundus: firm and non tender  Lower Extremities:  - Edema 2+   - Patellar Reflexes: 1+ bilaterally    Lab/Data Review:  Recent Labs     21  0616 21  1649 21  1545 21  1454 21  1344 21  1545 21  1022 20  0844 10/26/20  0857   WBC 5.8 8.8  --  7.0 5.6 4.8 5.4 4.4 5.1   HGB 9.2* 10.4*  --  12.3 11.1* 10.7* 10.9* 12.1 11.9    180  --  209 201 201 218 264 287   URICA 4.0 4.0 --  3.8 3.4 3.2  --   --  2.7   ALT 17 23  --  26 24 20  --  14 11   * 366*  --  292* 213* 249*  --   --  177   PUQ  --   --  370  --   --   --   --   --   --          Assessment and Plan:     Recommend change to bid procardia  Will give 3 days of lasix to help with edema  Need to monitor for another 24 hours. Possibly home tomorrow.      Marylou Landin MD  11:02 AM

## 2021-05-04 NOTE — PROGRESS NOTES
Shift assessment complete as noted. Patient request pain medicine and Ambien to sleep. Questions encouraged and answered. Encouraged to call for needs or concerns. Verbalizes understanding.

## 2021-05-04 NOTE — PROGRESS NOTES
Spoke to Dr. Ratna Richter to modify Procardia XL, first dose tonight at 2100. Lasix to be given now.

## 2021-05-04 NOTE — PROGRESS NOTES
Problem: Vaginal Delivery: Postpartum Day 1  Goal: Off Pathway (Use only if patient is Off Pathway)  Outcome: Progressing Towards Goal  Goal: Activity/Safety  Outcome: Progressing Towards Goal  Goal: Consults, if ordered  Outcome: Progressing Towards Goal  Goal: Diagnostic Test/Procedures  Outcome: Progressing Towards Goal  Goal: Nutrition/Diet  Outcome: Progressing Towards Goal  Goal: Discharge Planning  Outcome: Progressing Towards Goal  Goal: Medications  Outcome: Progressing Towards Goal  Goal: Treatments/Interventions/Procedures  Outcome: Progressing Towards Goal  Goal: Psychosocial  Outcome: Progressing Towards Goal  Goal: *Vital signs within defined limits  Outcome: Progressing Towards Goal  Goal: *Labs within defined limits  Outcome: Progressing Towards Goal  Goal: *Hemodynamically stable  Outcome: Progressing Towards Goal  Goal: *Optimal pain control at patient's stated goal  Outcome: Progressing Towards Goal  Goal: *Participates in infant care  Outcome: Progressing Towards Goal  Goal: *Demonstrates progressive activity  Outcome: Progressing Towards Goal  Goal: *Performs self perineal care  Outcome: Progressing Towards Goal  Goal: *Appropriate parent-infant bonding  Outcome: Progressing Towards Goal  Goal: *Tolerating diet  Outcome: Progressing Towards Goal  Goal: *Performs self breast care  Outcome: Progressing Towards Goal

## 2021-05-04 NOTE — PROGRESS NOTES
05/03/21 2035   Pain 1   Pain Scale 1 Numeric (0 - 10)   Pain Intensity 1 2   Pain Location 1 Perineum   Pain Orientation 1 Lower   Pain Description 1 Sore   Pain Intervention(s) 1 Medication (see MAR)     Motrin 800mg given for pain.

## 2021-05-05 VITALS
WEIGHT: 231 LBS | OXYGEN SATURATION: 99 % | HEART RATE: 75 BPM | BODY MASS INDEX: 36.26 KG/M2 | HEIGHT: 67 IN | SYSTOLIC BLOOD PRESSURE: 139 MMHG | TEMPERATURE: 98.4 F | DIASTOLIC BLOOD PRESSURE: 76 MMHG | RESPIRATION RATE: 18 BRPM

## 2021-05-05 PROCEDURE — 74011250637 HC RX REV CODE- 250/637: Performed by: OBSTETRICS & GYNECOLOGY

## 2021-05-05 RX ORDER — ESCITALOPRAM OXALATE 10 MG/1
10 TABLET ORAL DAILY
Qty: 30 TAB | Refills: 5 | Status: SHIPPED | OUTPATIENT
Start: 2021-05-06 | End: 2021-11-12

## 2021-05-05 RX ORDER — FUROSEMIDE 20 MG/1
TABLET ORAL
Qty: 3 TAB | Refills: 0 | Status: SHIPPED | OUTPATIENT
Start: 2021-05-06 | End: 2021-05-10

## 2021-05-05 RX ORDER — IBUPROFEN 800 MG/1
800 TABLET ORAL
Qty: 90 TAB | Refills: 2 | Status: SHIPPED | OUTPATIENT
Start: 2021-05-05 | End: 2021-06-15

## 2021-05-05 RX ORDER — HYDROCODONE BITARTRATE AND ACETAMINOPHEN 5; 325 MG/1; MG/1
1 TABLET ORAL
Qty: 10 TAB | Refills: 0 | Status: SHIPPED | OUTPATIENT
Start: 2021-05-05 | End: 2021-05-08

## 2021-05-05 RX ORDER — NIFEDIPINE 30 MG/1
30 TABLET, EXTENDED RELEASE ORAL EVERY 12 HOURS
Qty: 60 TAB | Refills: 2 | Status: SHIPPED | OUTPATIENT
Start: 2021-05-05 | End: 2021-11-12

## 2021-05-05 RX ADMIN — VALACYCLOVIR HYDROCHLORIDE 500 MG: 500 TABLET, FILM COATED ORAL at 09:05

## 2021-05-05 RX ADMIN — ESCITALOPRAM OXALATE 10 MG: 10 TABLET ORAL at 09:06

## 2021-05-05 RX ADMIN — NIFEDIPINE 30 MG: 30 TABLET, EXTENDED RELEASE ORAL at 09:05

## 2021-05-05 RX ADMIN — HYDROCODONE BITARTRATE AND ACETAMINOPHEN 1 TABLET: 5; 325 TABLET ORAL at 09:31

## 2021-05-05 RX ADMIN — IBUPROFEN 800 MG: 800 TABLET, FILM COATED ORAL at 15:01

## 2021-05-05 RX ADMIN — FUROSEMIDE 20 MG: 20 TABLET ORAL at 09:05

## 2021-05-05 RX ADMIN — DOCUSATE SODIUM 100 MG: 100 CAPSULE, LIQUID FILLED ORAL at 09:05

## 2021-05-05 NOTE — DISCHARGE INSTRUCTIONS
Discharge instruction to follow: Activity: Pelvis rest for 6 weeks     No heavy lifting over 15 lbs for 2 weeks     No driving for 2 weeks     No push/pull motion such as sweeping or vacuuming for 2 weeks     No tub baths for 6 weeks    Continue using the chandra-bottle after each void or bowel movement. If using sitz bath continue until comfortable stopping. If using chandra-bottle continue to use until comfortable stopping. Change sanitary pad after each urination or bowel movement. Call MD for the following:      Fever over 101 F; pain not relieved by medication; foul smelling vaginal discharge or an increase in vaginal bleeding. Take medication as prescribed. Follow up with MD as order. Vaginal Childbirth: Care Instructions  Overview  Vaginal birth means delivering a baby through the birth canal (vagina). During labor, the uterus tightens (contracts) regularly to thin and open the cervix and to push the baby out through the birth canal.  Your body will slowly heal in the next few weeks. It's easy to get too tired and overwhelmed during the first weeks after your baby is born. Changes in your hormones can shift your mood without warning. You may find it hard to meet the extra demands on your energy and time. Take it easy on yourself. Follow-up care is a key part of your treatment and safety. Be sure to make and go to all appointments, and call your doctor if you are having problems. It's also a good idea to know your test results and keep a list of the medicines you take. How can you care for yourself at home? Vaginal bleeding and cramps  · After delivery, you will have a bloody discharge from your vagina. This will turn pink within a week and then white or yellow after about 10 days. It may last for 2 to 4 weeks or longer, until the uterus has healed. Use sanitary pads until you stop bleeding. Using pads makes it easier to monitor your bleeding.   · Don't worry if you pass some blood clots, as long as they are smaller than a golf ball. If you have a tear or stitches in your vaginal area, change the pad at least every 4 hours. This will help prevent soreness and infection. · You may have cramps for the first few days after childbirth. These are normal and occur as the uterus shrinks to normal size. Take an over-the-counter pain medicine, such as acetaminophen (Tylenol), ibuprofen (Advil, Motrin), or naproxen (Aleve), for cramps. Read and follow all instructions on the label. Do not take aspirin, because it can cause more bleeding. · Do not take two or more pain medicines at the same time unless the doctor told you to. Many pain medicines have acetaminophen, which is Tylenol. Too much acetaminophen (Tylenol) can be harmful. Stitches  · If you have stitches, they will dissolve on their own and don't need to be removed. Follow your doctor's instructions for cleaning the stitched area. · Put ice or a cold pack on your painful area for 10 to 20 minutes at a time, several times a day, for the first few days. Put a thin cloth between the ice and your skin. · Sit in a few inches of warm water (sitz bath) 3 times a day and after bowel movements. The warm water helps with pain and itching. If you don't have a tub, a warm shower might help. Breast fullness  · Your breasts may overfill (engorge) in the first few days after delivery. To help milk flow and to relieve pain, warm your breasts in the shower or by using warm, moist towels before nursing. · If you aren't nursing, don't put warmth on your breasts or touch your breasts. Wear a bra that fits well and use ice until the fullness goes away. This usually takes 2 to 3 days. · Put ice or a cold pack on your breast after nursing to reduce swelling and pain. Put a thin cloth between the ice and your skin. Activity  · Eat a balanced diet. Don't try to lose weight by cutting calories. Keep taking your prenatal vitamins, or take a multivitamin.   · Get as much rest as you can. Try to take naps when your baby sleeps during the day. · Get some exercise every day. But don't do any heavy exercise until your doctor says it is okay. · Wait until you are healed (about 4 to 6 weeks) before you have sexual intercourse. Your doctor will tell you when it is okay to have sex. · If you don't want to get pregnant, talk to your doctor about birth control. You can get pregnant even before your period returns. Also, you can get pregnant while you are breastfeeding. Mental health  · It's normal to have some sadness, anxiety, sleeplessness, and mood swings after you go home. If you feel upset or hopeless for more than a few days or are having trouble doing the things you need to do, talk to your doctor. Constipation and hemorrhoids  · Drink plenty of fluids. If you have kidney, heart, or liver disease and have to limit fluids, talk with your doctor before you increase the amount of fluids you drink. · Eat plenty of fiber each day. Have a bran muffin or bran cereal for breakfast. Try eating a piece of fruit for a mid-afternoon snack. · For painful, itchy hemorrhoids, put ice or a cold pack on the area several times a day for 10 minutes at a time. Follow this by putting a warm compress on the area for another 10 to 20 minutes or by sitting in a shallow, warm bath. When should you call for help? Call  911 anytime you think you may need emergency care. For example, call if:    · You have thoughts of harming yourself, your baby, or another person.     · You passed out (lost consciousness).     · You have chest pain, are short of breath, or cough up blood.     · You have a seizure.    Call your doctor now or seek immediate medical care if:    · You have severe vaginal bleeding.     · You are dizzy or lightheaded, or you feel like you may faint.     · You have a fever.     · You have new or more pain in your belly or pelvis.     · You have symptoms of a blood clot in your leg (called a deep vein thrombosis), such as:  ? Pain in the calf, back of the knee, thigh, or groin. ? Redness and swelling in your leg or groin.     · You have signs of preeclampsia, such as:  ? Sudden swelling of your face, hands, or feet. ? New vision problems (such as dimness, blurring, or seeing spots). ? A severe headache. Watch closely for changes in your health, and be sure to contact your doctor if:    · Your vaginal bleeding seems to be getting heavier.     · You have new or worse vaginal discharge.     · You feel sad, anxious, or hopeless for more than a few days.     · You do not get better as expected. Where can you learn more? Go to http://www.gray.com/  Enter Q237 in the search box to learn more about \"Vaginal Childbirth: Care Instructions. \"  Current as of: October 8, 2020               Content Version: 12.8  © 2006-2021 [employer]. Care instructions adapted under license by [employer] (which disclaims liability or warranty for this information). If you have questions about a medical condition or this instruction, always ask your healthcare professional. [place] [address] any warranty or liability for your use of this information.

## 2021-05-05 NOTE — PROGRESS NOTES
Progress Note                               Patient: Saravanan Pacheco MRN: [id number]  SSN: [id number]    YOB: 1995  Age: 30 y.o. Sex: female      Postpartum Day Number 3    Subjective:     Patient doing well postpartum without significant complaints. Voiding without difficulty. Patient reports normal lochia. Pain well controlled, voiding on her own without difficulty. Denies HA, SOB/CP, RUQ pain, Vision changes. Objective:     Patient Vitals for the past 12 hrs:   Temp Pulse Resp BP SpO2   21 1100 98.4 °F (36.9 °C) 75 18 139/76 99 %   21 0718 98.4 °F (36.9 °C) 81 18 132/79 99 %   21 0520 98.3 °F (36.8 °C) 81 18 (!) 151/94 98 %       Temp (24hrs), Av.2 °F (36.8 °C), Min:98 °F (36.7 °C), Max:98.4 °F (36.9 °C)           Physical Exam:    Constitutional: She appears well-developed and well-nourished. No distress. HENT:    Head: Normocephalic and atraumatic. Cardiovascular: RRR  Pulmonary/Chest: CTAB  Abd:  S/NTTP/ND, BS normoactive, fundus firm at umbilicus  Ext:  No T/K/7+OFI edema; no clonus      Lab/Data Review:  CBC:  No results for input(s): WBC, HGB, HGBEXT, HCT, HCTEXT, PLT, PLTEXT, HGBEXT, HCTEXT, PLTEXT in the last 72 hours. BMP/CMP:  No results for input(s): NA, K, CL, CO2, AGAP, GLU, BUN, CREA, GFRAA, GFRNA, CA, MG, PHOS, ALB, TBIL, CBIL, TP, AP, GLOB, AGRAT, ALT in the last 72 hours. No lab exists for component: SGOT, GPT  PIH: No results for input(s): WBC, HGB, HGBEXT, HCT, HCTEXT, PLT, PLTEXT, NA, K, CL, CO2, AGAP, GLU, BUN, CREA, GFRAA, GFRNA, CA, MG, PHOS, ALB, TBIL, CBIL, TP, AP, GLOB, AGRAT, ALT, HGBEXT, HCTEXT, PLTEXT in the last 72 hours. No lab exists for component: SGOT, GPT No results for input(s): LDH, URICA, MG, PUQ in the last 72 hours.   GBS: No results found for: GRBSEXT, GRBSEXT  Blood Type:   Lab Results   Component Value Date/Time    ABO/Rh(D) A POSITIVE 2021 04:49 PM    ABO,Rh A+ Positive 2017 Assessment and Plan:     30 y.o. Maxwell Figueroa  ppd# 3 s/p  at 34w6d due to PTL and PreEclampsia:    1) PP:  Meeting all pp goals, continue routine care;  appropriate for DC home today w/ FU in office in 1wk for bp check w/ Dr Conrad Lopez  2) Breast feeding/pumping, Rh pos, Rub imm  3) PreEclampsia: never required Mag.  BPs currently mild on Procardia 30 XL BID and Lasix. HELLP labs wnl, P:C 0.3. 4) Anxiety/Dep:  Started on Lexpro postpartum.       5) Insomnia:  Requests small dose of Ambien to help w/ sleep in the next few days         Signed By: Luzmaria Herndon MD     May 5, 2021

## 2021-05-05 NOTE — PROGRESS NOTES
Patient discharged from Mother Infant room. Discharge teaching complete. Patient verbalizes understanding. Questions encouraged and answered. Patient walked to Special Care Nursery to visit infant. Stable at discharge.

## 2021-05-05 NOTE — DISCHARGE SUMMARY
Obstetrical Discharge Summary     Name: Grey Lang MRN: [id number]  SSN: [id number]    YOB: 1995  Age: 30 y.o. Sex: female      Allergies: Bactrim [sulfamethoprim ds]    Admit Date: 2021    Discharge Date: 2021     Admitting Physician: Ellen Blackwell MD     Attending Physician:  Emily Cordero MD     * Admission Diagnoses:  labor in third trimester [O60.03]; Pre-eclampsia in third trimester [O14.93];34 weeks gestation of pregnancy [Z3A.34]; Preeclampsia [O14.90]    * Discharge Diagnoses:   Information for the patient's :  Kellie Cluster [[id number]]   Delivery of a 4 lb 8.5 oz (2.055 kg) female infant via Vaginal, Spontaneous on 2021 at 6:32 PM  by Ellen Blackwell. Apgars were 6  and 8 .        Additional Diagnoses:   Hospital Problems as of 2021 Date Reviewed: 2021          Codes Class Noted - Resolved POA    Hypokalemia ICD-10-CM: E87.6  ICD-9-CM: 276.8  2021 - Present Yes        Preeclampsia ICD-10-CM: O14.90  ICD-9-CM: 642.40  2021 - Present Unknown        * (Principal) Pre-eclampsia in third trimester ICD-10-CM: O14.93  ICD-9-CM: 642.43  2021 - Present Yes         labor in third trimester ICD-10-CM: O60.03  ICD-9-CM: 644.20  2021 - Present Yes        34 weeks gestation of pregnancy ICD-10-CM: Z3A.34  ICD-9-CM: V22.2  2021 - Present Yes        Pre Eclampsia  ICD-10-CM: O13.3  ICD-9-CM: 642.33  2021 - Present Yes    Overview Addendum 2021 11:57 AM by Neelam Walker MD     2021: Pt here today for US only visit BPP 8/8, doppler snl  /80, Denies HA, vision changes, RUQ/epigastric pain, or N/V.   21:  BP elevated again today confirming Dx    New mild bps, P:C 0.3               Poor fetal growth, affecting management of mother, antepartum condition or complication [id number]: X07.5438  ICD-9-CM: 656.53  2021 - Present Yes    Overview Addendum 2021  9:31 AM by Binh Mendiola, Louisa Terrell MD     2/22/2021 [facility]: New borderline fetal growth restriction; AC 10%, Overall 29%, DEVON 22cm, Dopplers WNL  3/25/2021 at [facility]: Improved fetal growth restriction; AC 14%, Overall 31%, DEVON 25cm,  BPP 8/8             Multigravida in third trimester ICD-10-CM: Z34.83  ICD-9-CM: V22.1  10/26/2020 - Present Yes    Overview Addendum 4/20/2021  9:29 AM by Darnell Mosquera MD     NAOMI 6/7/2020 by LMP c/w 8wk US  11/23/2020 at [facility]: Normal NT and nasal bone. Requests genetic testing----->  NIPT drawn at [facility] this am.  Genetic counseling done by genetic counselor. 11/23/2020: CF/SMA neg, NIPT wnl (neg x 3, female)  1/11/2021 at [facility]: Normal anatomy and echo, LOW RISK NIPT, AC 21%, reassuring cervix  2/22/2021 [facility]: New borderline fetal growth restriction; AC 10%, Overall 29%, DEVON 22cm, Dopplers WNL  3/22/2021: Plans breastfeeding, depo and TDAP recommended  3/25/2021 at [facility]: Improved fetal growth restriction; AC 14%, Overall 31%, DEVON 25cm,  BPP 8/8  · No F/U MFM, to see as needed  4/12/2021: DEVON 22cm, dopplers nl, vertex             History of pre-eclampsia in prior pregnancy, currently pregnant in third trimester ICD-10-CM: O09.293  ICD-9-CM: V23.49  10/26/2020 - Present Yes    Overview Addendum 4/12/2021  2:44 PM by Kylah Garcia NP     10/26/2020: Baseline pre-e labs today, start  mg at 12 weeks  Referral sent to MFM to discuss significant obstetrical hx. Also APA labs were to be drawn in 03/2020 after last missed ab but I do not see that these were drawn. 11/23/2020 at [facility]: Check APA labs at next OB visit. Continue 162mg ASA at this time. 1/11/2021 at [facility]: 12/12/20 APA labs WNL. /80 today. Pt c/o seeing \"spots occasionally\" in the past few days. Denies PreE symptoms. · BP log given for pt to check BP next time she is feeling badly.   Instructed to call Primary OB or go to hospital if BP >150>110 consistently or she has PreE symptoms  2/22/2021 [facility]: BP reassuring but new growth lag; MFM to reassess in 4-6 weeks. 3/25/2021 [facility]: Reassuring maternal and fetal status today. Close surveillance with OB    2021: BP nl, urine trace protein  Given hx of preeclampsia and swelling/weight gain, pt to hospital for STAT pre-E labs and venous dopplers  Work note given for 1 week             Herpes virus infection in mother during third trimester of pregnancy ICD-10-CM: O98.513, B00.9  ICD-9-CM: 647.63, 054.9  10/26/2020 - Present Yes    Overview Addendum 3/25/2021  9:55 AM by Ronak Lafleur RN     Outbreak reported at 15 weeks - plan propho for remainder of preg  2021 at [facility]: Takes Valtrex 500mg BID    3/9/2021: ?outbreak vs. Injury from waxing, cx pending  Cx POS for herpes @ 27 weeks  3/25/2021 at [facility]: Denies current outbreak. History of pregnancy loss in prior pregnancy, currently pregnant in third trimester ICD-10-CM: O09.293  ICD-9-CM: V23.2  10/26/2020 - Present Yes    Overview Addendum 2021  9:29 AM by Dustin Go MD     2014  at 19 weeks.  Per pt, due to placental abruption  2020 at [facility]: unclear need for progesterone supplementation as this was a [facility] complicated pregnancy             Pregnancy complication, habitual aborter, third trimester ICD-10-CM: O26.23  ICD-9-CM: 646.33  10/26/2020 - Present Yes    Overview Addendum 2021  9:52 AM by Ronak Lafleur RN     Hx 4 SABs  2021 at [facility]: APA workup WNL                 Trichomonal vaginitis during pregnancy in first trimester ICD-10-CM: O23.591, A59.01  ICD-9-CM: 646.63, 131.01  2017 - Present Yes    Overview Addendum 2021  9:31 AM by Dustin Go MD     On NOB pap - repeat with other STDs around 35-36 weeks    2020: repeat NEG                  Lab Results   Component Value Date/Time    ABO/Rh(D) A POSITIVE 2021 04:49 PM    Rubella, External 20.80 2017    ABO,Rh A+ Positive 2017      Immunization History   Administered Date(s) Administered    DTaP 1994, 1994, 1994, 1995, 1997    Hib 1994, 1994, 1994, 1995    Influenza Vaccine 2010    MMR 1995, 1997    Meningococcal B (OMV) Vaccine 2009    OPV 1994, 1994, 1994, 1997    Tdap 2009, 2017    Varicella Virus Vaccine 1997, 2009       * Procedures:        * Discharge Condition: good    * Hospital Course:  Complicated by PreEclampsia requiring an additional 1 day hospital stay. * Disposition: Home    Discharge Medications:   Current Discharge Medication List      START taking these medications    Details   escitalopram oxalate (LEXAPRO) 10 mg tablet Take 1 Tab by mouth daily. Qty: 30 Tab, Refills: 5      furosemide (LASIX) 20 mg tablet 1 tab daily for 3 days  Qty: 3 Tab, Refills: 0      HYDROcodone-acetaminophen (NORCO) 5-325 mg per tablet Take 1 Tab by mouth every four (4) hours as needed for Pain for up to 3 days. Max Daily Amount: 6 Tabs. Qty: 10 Tab, Refills: 0    Associated Diagnoses: Multigravida in third trimester; History of pre-eclampsia in prior pregnancy, currently pregnant in third trimester      ibuprofen (MOTRIN) 800 mg tablet Take 1 Tab by mouth every six (6) hours as needed for Pain. Qty: 90 Tab, Refills: 2      NIFEdipine ER (PROCARDIA XL) 30 mg ER tablet Take 1 Tab by mouth every twelve (12) hours. Qty: 60 Tab, Refills: 2         CONTINUE these medications which have NOT CHANGED    Details   ascorbic acid, vitamin C, (Vitamin C) 500 mg tablet Take 1,000 mg by mouth. cholecalciferol, vitamin D3, (Vitamin D3) 50 mcg (2,000 unit) tab Take  by mouth. famotidine (PEPCID) 20 mg tablet Take 1 Tab by mouth two (2) times a day. Indications: gastroesophageal reflux disease  Qty: 60 Tab, Refills: 5      valACYclovir (VALTREX) 500 mg tablet Take 1 Tab by mouth two (2) times a day.   Qty: 60 Tab, Refills: 11      prenatal vit-iron fumarate-fa 27 mg iron- 0.8 mg tab tablet Take 1 Tab by mouth daily. Qty: 90 Tab, Refills: 3      ferrous sulfate (IRON) 325 mg (65 mg iron) EC tablet Take 1 Tab by mouth three (3) times daily (with meals). Qty: 60 Tab, Refills: 6      docusate sodium (COLACE) 100 mg capsule Take 1 Cap by mouth two (2) times a day for 90 days. Qty: 60 Cap, Refills: 6         STOP taking these medications       aspirin delayed-release 81 mg tablet Comments:   Reason for Stopping:               * Follow-up Care/Patient Instructions:   Activity: No sex for 6 weeks, No driving while on analgesics and No heavy lifting for 6 weeks  Diet: Regular Diet  Wound Care: Keep wound clean and dry    Follow-up Information     Follow up With Specialties Details Why Contact Info    Gurmeet Carvajal NP Nurse Practitioner   [id number] [place] [address] Rd  [phone]

## 2021-05-14 NOTE — PROGRESS NOTES
Referral made to [facility]  home visit program.    You Ackerman [address]   [phone]

## 2021-06-15 PROBLEM — Z30.09 CONTRACEPTIVE EDUCATION: Status: ACTIVE | Noted: 2021-06-15

## 2021-11-05 ENCOUNTER — APPOINTMENT (OUTPATIENT)
Dept: ULTRASOUND IMAGING | Age: 26
End: 2021-11-05
Attending: PHYSICIAN ASSISTANT
Payer: COMMERCIAL

## 2021-11-05 ENCOUNTER — HOSPITAL ENCOUNTER (EMERGENCY)
Age: 26
Discharge: HOME | End: 2021-11-06
Attending: OBSTETRICS & GYNECOLOGY | Admitting: OBSTETRICS & GYNECOLOGY
Payer: COMMERCIAL

## 2021-11-05 VITALS
BODY MASS INDEX: 33.27 KG/M2 | OXYGEN SATURATION: 100 % | HEART RATE: 70 BPM | DIASTOLIC BLOOD PRESSURE: 78 MMHG | TEMPERATURE: 98.5 F | SYSTOLIC BLOOD PRESSURE: 127 MMHG | HEIGHT: 67 IN | RESPIRATION RATE: 20 BRPM | WEIGHT: 212 LBS

## 2021-11-05 DIAGNOSIS — O30.001 TWIN GESTATION IN FIRST TRIMESTER, UNSPECIFIED MULTIPLE GESTATION TYPE: Primary | ICD-10-CM

## 2021-11-05 LAB
ABO + RH BLD: NORMAL
ALBUMIN SERPL-MCNC: 3.5 G/DL (ref 3.5–5)
ALBUMIN/GLOB SERPL: 0.9 {RATIO} (ref 1.2–3.5)
ALP SERPL-CCNC: 55 U/L (ref 50–136)
ALT SERPL-CCNC: 20 U/L (ref 12–65)
ANION GAP SERPL CALC-SCNC: 6 MMOL/L (ref 7–16)
AST SERPL-CCNC: 18 U/L (ref 15–37)
BASOPHILS # BLD: 0 K/UL (ref 0–0.2)
BASOPHILS NFR BLD: 0 % (ref 0–2)
BILIRUB SERPL-MCNC: 0.2 MG/DL (ref 0.2–1.1)
BUN SERPL-MCNC: 6 MG/DL (ref 6–23)
CALCIUM SERPL-MCNC: 9.2 MG/DL (ref 8.3–10.4)
CHLORIDE SERPL-SCNC: 107 MMOL/L (ref 98–107)
CO2 SERPL-SCNC: 24 MMOL/L (ref 21–32)
CREAT SERPL-MCNC: 0.76 MG/DL (ref 0.6–1)
DIFFERENTIAL METHOD BLD: ABNORMAL
EOSINOPHIL # BLD: 0 K/UL (ref 0–0.8)
EOSINOPHIL NFR BLD: 1 % (ref 0.5–7.8)
ERYTHROCYTE [DISTWIDTH] IN BLOOD BY AUTOMATED COUNT: 14.4 % (ref 11.9–14.6)
GLOBULIN SER CALC-MCNC: 3.9 G/DL (ref 2.3–3.5)
GLUCOSE SERPL-MCNC: 82 MG/DL (ref 65–100)
HCG SERPL-ACNC: ABNORMAL MIU/ML (ref 0–6)
HCG UR QL: POSITIVE
HCT VFR BLD AUTO: 32.8 % (ref 35.8–46.3)
HGB BLD-MCNC: 10.9 G/DL (ref 11.7–15.4)
IMM GRANULOCYTES # BLD AUTO: 0 K/UL (ref 0–0.5)
IMM GRANULOCYTES NFR BLD AUTO: 0 % (ref 0–5)
LYMPHOCYTES # BLD: 2.1 K/UL (ref 0.5–4.6)
LYMPHOCYTES NFR BLD: 31 % (ref 13–44)
MCH RBC QN AUTO: 30.6 PG (ref 26.1–32.9)
MCHC RBC AUTO-ENTMCNC: 33.2 G/DL (ref 31.4–35)
MCV RBC AUTO: 92.1 FL (ref 79.6–97.8)
MONOCYTES # BLD: 0.7 K/UL (ref 0.1–1.3)
MONOCYTES NFR BLD: 11 % (ref 4–12)
NEUTS SEG # BLD: 4 K/UL (ref 1.7–8.2)
NEUTS SEG NFR BLD: 58 % (ref 43–78)
NRBC # BLD: 0 K/UL (ref 0–0.2)
PLATELET # BLD AUTO: 244 K/UL (ref 150–450)
PMV BLD AUTO: 10.1 FL (ref 9.4–12.3)
POTASSIUM SERPL-SCNC: 3.8 MMOL/L (ref 3.5–5.1)
PROT SERPL-MCNC: 7.4 G/DL (ref 6.3–8.2)
RBC # BLD AUTO: 3.56 M/UL (ref 4.05–5.2)
SODIUM SERPL-SCNC: 137 MMOL/L (ref 136–145)
WBC # BLD AUTO: 6.9 K/UL (ref 4.3–11.1)

## 2021-11-05 PROCEDURE — 84702 CHORIONIC GONADOTROPIN TEST: CPT

## 2021-11-05 PROCEDURE — 99281 EMR DPT VST MAYX REQ PHY/QHP: CPT

## 2021-11-05 PROCEDURE — 80053 COMPREHEN METABOLIC PANEL: CPT

## 2021-11-05 PROCEDURE — 99283 EMERGENCY DEPT VISIT LOW MDM: CPT

## 2021-11-05 PROCEDURE — 85025 COMPLETE CBC W/AUTO DIFF WBC: CPT

## 2021-11-05 PROCEDURE — 81025 URINE PREGNANCY TEST: CPT

## 2021-11-05 PROCEDURE — 86900 BLOOD TYPING SEROLOGIC ABO: CPT

## 2021-11-05 PROCEDURE — 76817 TRANSVAGINAL US OBSTETRIC: CPT

## 2021-11-06 NOTE — ED PROVIDER NOTES
Patient is a 49-year-old female who presents with complaint of abdominal cramping in early pregnancy. She has had 8 previous pregnancies with 4 miscarriages and 3 living children. She had her last child in May 2021. She was breast-feeding and has not had a menstrual cycle since 2021. She states that she did not think much of missing her menstrual cycle because she was breast-feeding. However about a week ago she decided to take a home pregnancy test and found that it was positive. Earlier this week she had a couple episodes of spotting and has had intermittent pelvic cramping. Patient concerned and came to the hospital.  She is not having any vaginal bleeding now and she denies any current discomfort. She denies any nausea or vomiting. The history is provided by the patient.         Past Medical History:   Diagnosis Date    Abnormal genetic test during pregnancy 10/26/2020    G6 - NIPT elevated risk for trisomy 18, missed AB with suction D&C    Anemia     Genital herpes simplex 3/22/2018    Hypertension     gestational BP    Missed ab 2/10/15    Postpartum depression 6/15/2021    Preeclampsia      delivery     Subchorionic bleed 2014    Vulvovaginal rash 3/9/2021       Past Surgical History:   Procedure Laterality Date    HX GYN      HX OTHER SURGICAL      D&C     HX OTHER SURGICAL      D&C          Family History:   Problem Relation Age of Onset    Asthma Father     Diabetes Father     Bleeding Prob Brother         Brain    Diabetes Brother     Other Brother         brain tumor    Other Paternal Grandmother         Lung cancer    Other Mother         cervical cancer    Other Maternal Grandmother         cervical cancer       Social History     Socioeconomic History    Marital status: SINGLE     Spouse name: Not on file    Number of children: Not on file    Years of education: Not on file    Highest education level: Not on file   Occupational History    Not on file   Tobacco Use    Smoking status: Former Smoker     Packs/day: 0.25     Years: 0.50     Pack years: 0.12     Quit date: 2020     Years since quittin.1    Smokeless tobacco: Never Used   Vaping Use    Vaping Use: Never used   Substance and Sexual Activity    Alcohol use: No    Drug use: No    Sexual activity: Yes     Partners: Male   Other Topics Concern     Service Not Asked    Blood Transfusions Not Asked    Caffeine Concern No    Occupational Exposure Not Asked    Hobby Hazards Not Asked    Sleep Concern Not Asked    Stress Concern Not Asked    Weight Concern Not Asked    Special Diet Not Asked    Back Care Not Asked    Exercise No    Bike Helmet Not Asked    Seat Belt Yes    Self-Exams Yes   Social History Narrative    Abuse: Feels safe at home, no history of physical abuse, no history of sexual abuse     Social Determinants of Health     Financial Resource Strain:     Difficulty of Paying Living Expenses: Not on file   Food Insecurity:     Worried About Running Out of Food in the Last Year: Not on file    Efrain of Food in the Last Year: Not on file   Transportation Needs:     Lack of Transportation (Medical): Not on file    Lack of Transportation (Non-Medical):  Not on file   Physical Activity:     Days of Exercise per Week: Not on file    Minutes of Exercise per Session: Not on file   Stress:     Feeling of Stress : Not on file   Social Connections:     Frequency of Communication with Friends and Family: Not on file    Frequency of Social Gatherings with Friends and Family: Not on file    Attends Sikhism Services: Not on file    Active Member of Clubs or Organizations: Not on file    Attends Club or Organization Meetings: Not on file    Marital Status: Not on file   Intimate Partner Violence:     Fear of Current or Ex-Partner: Not on file    Emotionally Abused: Not on file    Physically Abused: Not on file    Sexually Abused: Not on file   Housing Stability:     Unable to Pay for Housing in the Last Year: Not on file    Number of Places Lived in the Last Year: Not on file    Unstable Housing in the Last Year: Not on file         ALLERGIES: Bactrim [sulfamethoprim ds]    Review of Systems   Constitutional: Negative for chills, fatigue and fever. HENT: Negative for congestion and sore throat. Respiratory: Negative for cough and shortness of breath. Cardiovascular: Negative for chest pain. Gastrointestinal: Negative for abdominal pain, nausea and vomiting. Genitourinary: Positive for pelvic pain and vaginal bleeding (earlier this week, no current bleeding). Negative for dysuria and flank pain. Musculoskeletal: Negative for back pain. Neurological: Negative for light-headedness. Psychiatric/Behavioral: Negative for behavioral problems. Vitals:    11/05/21 2118 11/05/21 2205   BP: 136/65    Pulse: 75    Resp: 18    Temp: 98.5 °F (36.9 °C)    SpO2: 100%    Weight:  96.2 kg (212 lb)   Height:  5' 7\" (1.702 m)            Physical Exam  Vitals and nursing note reviewed. Constitutional:       General: She is not in acute distress. Appearance: She is not toxic-appearing. HENT:      Head: Normocephalic and atraumatic. Cardiovascular:      Rate and Rhythm: Normal rate. Pulses: Normal pulses. Pulmonary:      Effort: Pulmonary effort is normal.      Breath sounds: Normal breath sounds. Abdominal:      Palpations: Abdomen is soft. Tenderness: There is no abdominal tenderness. Comments: Lower abdomen does appear distended but is nontender with palpation   Skin:     General: Skin is warm and dry. Neurological:      Mental Status: She is alert and oriented to person, place, and time. Psychiatric:         Mood and Affect: Mood normal.         Behavior: Behavior normal.         Thought Content:  Thought content normal.         Judgment: Judgment normal.          MDM  Number of Diagnoses or Management Options  Twin gestation in first trimester, unspecified multiple gestation type  Diagnosis management comments: Patient is a 24-year-old female presenting with abdominal cramping and intermittent vaginal spotting for the past 3 days. She has not had a menstrual cycle since July but also had a baby 6 months ago and was breast-feeding so she did not think anything of it. She has positive pregnancy test as of last week. We will obtain labs including quantitative beta hCG and abdominal ultrasound to evaluate for intrauterine gestation. Labs Reviewed  CBC WITH AUTOMATED DIFF - Abnormal; Notable for the following components:     RBC                           3.56 (*)               HGB                           10.9 (*)               HCT                           32.8 (*)            All other components within normal limits  METABOLIC PANEL, COMPREHENSIVE - Abnormal; Notable for the following components:     Anion gap                     6 (*)                  Globulin                      3.9 (*)                A-G Ratio                     0.9 (*)             All other components within normal limits  BETA HCG, QT - Abnormal; Notable for the following components:     Beta HCG, QT                  75,660 (*)            All other components within normal limits  HCG URINE, QL. - POC - Abnormal; Notable for the following components:     Pregnancy test,urine (POC)    Positive (*)            All other components within normal limits  BLOOD TYPE, (ABO+RH)    Pelvic ultrasound shows:    1. Live twin intrauterine gestation. 2. Small simple cysts in both ovaries. All results discussed with patient. She has follow-up appointment with OB/GYN on 11/12/2021. Advised that she call the office on Monday to see if they want to see her sooner. Instructed to start prenatal vitamins, discussed reasons to return to ER. Patient agreeable to plan.          Procedures

## 2021-11-06 NOTE — PROGRESS NOTES
Patient presents to [facility] with complaint of cramping for the past 3 days about every 45min when on her feet.  Patient states she had a baby 6months ago and had a positive urin pregnancy test. Patient transferred to ER report called

## 2021-11-06 NOTE — ED NOTES
I have reviewed discharge instructions with the patient. The patient verbalized understanding. Patient left ED via Discharge Method: ambulatory to Home with (self). Opportunity for questions and clarification provided. Patient given 0 scripts. To continue your aftercare when you leave the hospital, you may receive an automated call from our care team to check in on how you are doing. This is a free service and part of our promise to provide the best care and service to meet your aftercare needs.  If you have questions, or wish to unsubscribe from this service please call [phone]. Thank you for Choosing our [facility] Emergency Department.

## 2021-11-12 PROBLEM — O09.291 HISTORY OF PRE-ECLAMPSIA IN PRIOR PREGNANCY, CURRENTLY PREGNANT IN FIRST TRIMESTER: Status: ACTIVE | Noted: 2021-11-12

## 2021-11-12 PROBLEM — O30.041 DICHORIONIC DIAMNIOTIC TWIN PREGNANCY IN FIRST TRIMESTER: Status: ACTIVE | Noted: 2021-11-12

## 2021-11-12 PROBLEM — B00.9 HERPES SIMPLEX VIRUS TYPE 2 (HSV-2) INFECTION AFFECTING PREGNANCY IN FIRST TRIMESTER: Status: ACTIVE | Noted: 2021-11-12

## 2021-11-12 PROBLEM — O09.899 SHORT INTERVAL BETWEEN PREGNANCIES AFFECTING PREGNANCY, ANTEPARTUM: Status: ACTIVE | Noted: 2021-11-12

## 2021-11-12 PROBLEM — Z86.59 HISTORY OF POSTPARTUM DEPRESSION: Status: ACTIVE | Noted: 2021-06-15

## 2021-11-12 PROBLEM — Z87.59 HISTORY OF POSTPARTUM DEPRESSION: Status: ACTIVE | Noted: 2021-06-15

## 2021-11-12 PROBLEM — Z34.81 MULTIGRAVIDA IN FIRST TRIMESTER: Status: ACTIVE | Noted: 2021-11-12

## 2021-11-12 PROBLEM — O98.511 HERPES SIMPLEX VIRUS TYPE 2 (HSV-2) INFECTION AFFECTING PREGNANCY IN FIRST TRIMESTER: Status: ACTIVE | Noted: 2021-11-12

## 2022-01-07 PROBLEM — O30.042 DICHORIONIC DIAMNIOTIC TWIN PREGNANCY IN SECOND TRIMESTER: Status: ACTIVE | Noted: 2021-11-12

## 2022-01-07 PROBLEM — O26.12 LOW WEIGHT GAIN DURING PREGNANCY IN SECOND TRIMESTER: Status: ACTIVE | Noted: 2022-01-07

## 2022-01-09 ENCOUNTER — HOSPITAL ENCOUNTER (EMERGENCY)
Age: 27
Discharge: HOME | End: 2022-01-09
Attending: EMERGENCY MEDICINE

## 2022-01-11 PROBLEM — O26.20 HABITUAL ABORTER, CURRENTLY PREGNANT: Status: ACTIVE | Noted: 2022-01-11

## 2022-01-25 ENCOUNTER — HOSPITAL ENCOUNTER (OUTPATIENT)
Age: 27
Setting detail: OBSERVATION
LOS: 1 days | Discharge: HOME | End: 2022-01-30
Attending: OBSTETRICS & GYNECOLOGY | Admitting: OBSTETRICS & GYNECOLOGY
Payer: COMMERCIAL

## 2022-01-25 DIAGNOSIS — K59.09 OTHER CONSTIPATION: ICD-10-CM

## 2022-01-25 DIAGNOSIS — O26.872 SHORT CERVIX DURING PREGNANCY IN SECOND TRIMESTER: ICD-10-CM

## 2022-01-25 DIAGNOSIS — O26.872 SHORT CERVIX IN SECOND TRIMESTER, ANTEPARTUM: ICD-10-CM

## 2022-01-25 DIAGNOSIS — Z34.81 MULTIGRAVIDA IN FIRST TRIMESTER: ICD-10-CM

## 2022-01-25 DIAGNOSIS — O30.042 DICHORIONIC DIAMNIOTIC TWIN PREGNANCY IN SECOND TRIMESTER: ICD-10-CM

## 2022-01-25 DIAGNOSIS — O09.899 SHORT INTERVAL BETWEEN PREGNANCIES AFFECTING PREGNANCY, ANTEPARTUM: ICD-10-CM

## 2022-01-25 PROBLEM — O99.891 BACK PAIN AFFECTING PREGNANCY IN SECOND TRIMESTER: Status: ACTIVE | Noted: 2022-01-25

## 2022-01-25 PROBLEM — M54.9 BACK PAIN AFFECTING PREGNANCY IN SECOND TRIMESTER: Status: ACTIVE | Noted: 2022-01-25

## 2022-01-25 LAB
ABO + RH BLD: NORMAL
BASOPHILS # BLD: 0 K/UL (ref 0–0.2)
BASOPHILS NFR BLD: 0 % (ref 0–2)
BLOOD GROUP ANTIBODIES SERPL: NORMAL
DIFFERENTIAL METHOD BLD: ABNORMAL
EOSINOPHIL # BLD: 0 K/UL (ref 0–0.8)
EOSINOPHIL NFR BLD: 0 % (ref 0.5–7.8)
ERYTHROCYTE [DISTWIDTH] IN BLOOD BY AUTOMATED COUNT: 13.9 % (ref 11.9–14.6)
GLUCOSE, GLUUPC: NEGATIVE
HCT VFR BLD AUTO: 32.1 % (ref 35.8–46.3)
HGB BLD-MCNC: 10.7 G/DL (ref 11.7–15.4)
IMM GRANULOCYTES # BLD AUTO: 0 K/UL (ref 0–0.5)
IMM GRANULOCYTES NFR BLD AUTO: 1 % (ref 0–5)
KETONES UR-MCNC: NEGATIVE MG/DL
LYMPHOCYTES # BLD: 1.6 K/UL (ref 0.5–4.6)
LYMPHOCYTES NFR BLD: 26 % (ref 13–44)
MCH RBC QN AUTO: 30.3 PG (ref 26.1–32.9)
MCHC RBC AUTO-ENTMCNC: 33.3 G/DL (ref 31.4–35)
MCV RBC AUTO: 90.9 FL (ref 79.6–97.8)
MONOCYTES # BLD: 0.6 K/UL (ref 0.1–1.3)
MONOCYTES NFR BLD: 10 % (ref 4–12)
NEUTS SEG # BLD: 3.9 K/UL (ref 1.7–8.2)
NEUTS SEG NFR BLD: 63 % (ref 43–78)
NRBC # BLD: 0 K/UL (ref 0–0.2)
PLATELET # BLD AUTO: 216 K/UL (ref 150–450)
PMV BLD AUTO: 10.6 FL (ref 9.4–12.3)
PROT UR QL: NEGATIVE
RBC # BLD AUTO: 3.53 M/UL (ref 4.05–5.2)
SERVICE CMNT-IMP: NORMAL
SPECIMEN EXP DATE BLD: NORMAL
WBC # BLD AUTO: 6.2 K/UL (ref 4.3–11.1)
WET PREP GENITAL: NORMAL

## 2022-01-25 PROCEDURE — 87491 CHLMYD TRACH DNA AMP PROBE: CPT

## 2022-01-25 PROCEDURE — 86900 BLOOD TYPING SEROLOGIC ABO: CPT

## 2022-01-25 PROCEDURE — 85025 COMPLETE CBC W/AUTO DIFF WBC: CPT

## 2022-01-25 PROCEDURE — 81002 URINALYSIS NONAUTO W/O SCOPE: CPT | Performed by: OBSTETRICS & GYNECOLOGY

## 2022-01-25 PROCEDURE — 81003 URINALYSIS AUTO W/O SCOPE: CPT

## 2022-01-25 PROCEDURE — 99285 EMERGENCY DEPT VISIT HI MDM: CPT

## 2022-01-25 PROCEDURE — 87210 SMEAR WET MOUNT SALINE/INK: CPT

## 2022-01-25 PROCEDURE — 76815 OB US LIMITED FETUS(S): CPT

## 2022-01-25 PROCEDURE — 75810000275 HC EMERGENCY DEPT VISIT NO LEVEL OF CARE

## 2022-01-25 PROCEDURE — 74011250637 HC RX REV CODE- 250/637: Performed by: OBSTETRICS & GYNECOLOGY

## 2022-01-25 PROCEDURE — 87086 URINE CULTURE/COLONY COUNT: CPT

## 2022-01-25 PROCEDURE — G0378 HOSPITAL OBSERVATION PER HR: HCPCS

## 2022-01-25 PROCEDURE — 74011250636 HC RX REV CODE- 250/636: Performed by: OBSTETRICS & GYNECOLOGY

## 2022-01-25 RX ORDER — SODIUM CHLORIDE 0.9 % (FLUSH) 0.9 %
5-40 SYRINGE (ML) INJECTION EVERY 8 HOURS
Status: DISCONTINUED | OUTPATIENT
Start: 2022-01-25 | End: 2022-01-30 | Stop reason: HOSPADM

## 2022-01-25 RX ORDER — DIPHENHYDRAMINE HCL 25 MG
25 CAPSULE ORAL
Status: DISCONTINUED | OUTPATIENT
Start: 2022-01-25 | End: 2022-01-30 | Stop reason: HOSPADM

## 2022-01-25 RX ORDER — HYDROXYZINE 25 MG/1
25 TABLET, FILM COATED ORAL
Status: DISCONTINUED | OUTPATIENT
Start: 2022-01-25 | End: 2022-01-30 | Stop reason: HOSPADM

## 2022-01-25 RX ORDER — INDOMETHACIN 50 MG/1
50 CAPSULE ORAL EVERY 6 HOURS
Status: COMPLETED | OUTPATIENT
Start: 2022-01-25 | End: 2022-01-29

## 2022-01-25 RX ORDER — PROMETHAZINE HYDROCHLORIDE 25 MG/1
25 TABLET ORAL
Status: DISCONTINUED | OUTPATIENT
Start: 2022-01-25 | End: 2022-01-30 | Stop reason: HOSPADM

## 2022-01-25 RX ORDER — SODIUM CHLORIDE 0.9 % (FLUSH) 0.9 %
5-40 SYRINGE (ML) INJECTION AS NEEDED
Status: DISCONTINUED | OUTPATIENT
Start: 2022-01-25 | End: 2022-01-30 | Stop reason: HOSPADM

## 2022-01-25 RX ORDER — ZOLPIDEM TARTRATE 5 MG/1
5 TABLET ORAL
Status: DISCONTINUED | OUTPATIENT
Start: 2022-01-25 | End: 2022-01-27

## 2022-01-25 RX ORDER — ACETAMINOPHEN 325 MG/1
650 TABLET ORAL
Status: DISCONTINUED | OUTPATIENT
Start: 2022-01-25 | End: 2022-01-30 | Stop reason: HOSPADM

## 2022-01-25 RX ADMIN — ZOLPIDEM TARTRATE 5 MG: 5 TABLET ORAL at 22:06

## 2022-01-25 RX ADMIN — SODIUM CHLORIDE, SODIUM LACTATE, POTASSIUM CHLORIDE, AND CALCIUM CHLORIDE 500 ML: 600; 310; 30; 20 INJECTION, SOLUTION INTRAVENOUS at 17:42

## 2022-01-25 RX ADMIN — INDOMETHACIN 50 MG: 50 CAPSULE ORAL at 23:33

## 2022-01-25 RX ADMIN — INDOMETHACIN 50 MG: 50 CAPSULE ORAL at 17:42

## 2022-01-25 RX ADMIN — ACETAMINOPHEN 650 MG: 325 TABLET, FILM COATED ORAL at 18:22

## 2022-01-25 NOTE — PROGRESS NOTES
Pt arrived to [facility] triage with complaints of abdominal and back pain. Dr. Nelida Shankar made aware. Triage assessment as documented.

## 2022-01-26 ENCOUNTER — ANESTHESIA EVENT (OUTPATIENT)
Dept: LABOR AND DELIVERY | Age: 27
End: 2022-01-26
Payer: COMMERCIAL

## 2022-01-26 ENCOUNTER — ANESTHESIA (OUTPATIENT)
Dept: LABOR AND DELIVERY | Age: 27
End: 2022-01-26
Payer: COMMERCIAL

## 2022-01-26 PROCEDURE — 74011000250 HC RX REV CODE- 250: Performed by: OBSTETRICS & GYNECOLOGY

## 2022-01-26 PROCEDURE — 76817 TRANSVAGINAL US OBSTETRIC: CPT | Performed by: OBSTETRICS & GYNECOLOGY

## 2022-01-26 PROCEDURE — 77030002986 HC SUT PROL J&J -A: Performed by: OBSTETRICS & GYNECOLOGY

## 2022-01-26 PROCEDURE — 74011250637 HC RX REV CODE- 250/637: Performed by: OBSTETRICS & GYNECOLOGY

## 2022-01-26 PROCEDURE — 76815 OB US LIMITED FETUS(S): CPT | Performed by: OBSTETRICS & GYNECOLOGY

## 2022-01-26 PROCEDURE — 74011250636 HC RX REV CODE- 250/636: Performed by: NURSE ANESTHETIST, CERTIFIED REGISTERED

## 2022-01-26 PROCEDURE — 74011250636 HC RX REV CODE- 250/636: Performed by: ANESTHESIOLOGY

## 2022-01-26 PROCEDURE — 77030003665 HC NDL SPN BBMI -A: Performed by: ANESTHESIOLOGY

## 2022-01-26 PROCEDURE — 74011250636 HC RX REV CODE- 250/636: Performed by: OBSTETRICS & GYNECOLOGY

## 2022-01-26 PROCEDURE — 59320 REVISION OF CERVIX: CPT | Performed by: OBSTETRICS & GYNECOLOGY

## 2022-01-26 PROCEDURE — 77030007880 HC KT SPN EPDRL BBMI -B: Performed by: NURSE ANESTHETIST, CERTIFIED REGISTERED

## 2022-01-26 PROCEDURE — G0378 HOSPITAL OBSERVATION PER HR: HCPCS

## 2022-01-26 PROCEDURE — 76060000032 HC ANESTHESIA 0.5 TO 1 HR: Performed by: OBSTETRICS & GYNECOLOGY

## 2022-01-26 PROCEDURE — 51701 INSERT BLADDER CATHETER: CPT

## 2022-01-26 PROCEDURE — 2709999900 HC NON-CHARGEABLE SUPPLY

## 2022-01-26 PROCEDURE — 99220 PR INITIAL OBSERVATION CARE/DAY 70 MINUTES: CPT | Performed by: OBSTETRICS & GYNECOLOGY

## 2022-01-26 PROCEDURE — 76010000389 HC LDRP PROCEDURE 0.5 TO 1 HR: Performed by: OBSTETRICS & GYNECOLOGY

## 2022-01-26 PROCEDURE — 76210000064 HC RECOV POST SURG EA 0.5 HR: Performed by: OBSTETRICS & GYNECOLOGY

## 2022-01-26 PROCEDURE — 2709999900 HC NON-CHARGEABLE SUPPLY: Performed by: OBSTETRICS & GYNECOLOGY

## 2022-01-26 RX ORDER — ONDANSETRON 2 MG/ML
INJECTION INTRAMUSCULAR; INTRAVENOUS AS NEEDED
Status: DISCONTINUED | OUTPATIENT
Start: 2022-01-26 | End: 2022-01-26 | Stop reason: HOSPADM

## 2022-01-26 RX ORDER — CHLOROPROCAINE HYDROCHLORIDE 30 MG/ML
INJECTION, SOLUTION EPIDURAL; INFILTRATION; INTRACAUDAL; PERINEURAL AS NEEDED
Status: DISCONTINUED | OUTPATIENT
Start: 2022-01-26 | End: 2022-01-26 | Stop reason: HOSPADM

## 2022-01-26 RX ORDER — SODIUM CHLORIDE 0.9 % (FLUSH) 0.9 %
5-40 SYRINGE (ML) INJECTION AS NEEDED
Status: DISCONTINUED | OUTPATIENT
Start: 2022-01-26 | End: 2022-01-27

## 2022-01-26 RX ORDER — FLUTICASONE PROPIONATE 50 MCG
2 SPRAY, SUSPENSION (ML) NASAL DAILY
Status: DISCONTINUED | OUTPATIENT
Start: 2022-01-27 | End: 2022-01-30 | Stop reason: HOSPADM

## 2022-01-26 RX ORDER — HYDROCODONE BITARTRATE AND ACETAMINOPHEN 5; 325 MG/1; MG/1
2 TABLET ORAL
Status: DISCONTINUED | OUTPATIENT
Start: 2022-01-26 | End: 2022-01-27

## 2022-01-26 RX ORDER — LANOLIN ALCOHOL/MO/W.PET/CERES
3 CREAM (GRAM) TOPICAL
Status: DISCONTINUED | OUTPATIENT
Start: 2022-01-26 | End: 2022-01-30 | Stop reason: HOSPADM

## 2022-01-26 RX ORDER — SODIUM CHLORIDE, SODIUM LACTATE, POTASSIUM CHLORIDE, CALCIUM CHLORIDE 600; 310; 30; 20 MG/100ML; MG/100ML; MG/100ML; MG/100ML
INJECTION, SOLUTION INTRAVENOUS
Status: DISCONTINUED | OUTPATIENT
Start: 2022-01-26 | End: 2022-01-26 | Stop reason: HOSPADM

## 2022-01-26 RX ORDER — POLYETHYLENE GLYCOL 3350 17 G/17G
17 POWDER, FOR SOLUTION ORAL
Status: DISCONTINUED | OUTPATIENT
Start: 2022-01-26 | End: 2022-01-27 | Stop reason: SDUPTHER

## 2022-01-26 RX ORDER — BUTORPHANOL TARTRATE 2 MG/ML
2 INJECTION INTRAMUSCULAR; INTRAVENOUS ONCE
Status: COMPLETED | OUTPATIENT
Start: 2022-01-26 | End: 2022-01-26

## 2022-01-26 RX ORDER — SODIUM CHLORIDE 0.9 % (FLUSH) 0.9 %
5-40 SYRINGE (ML) INJECTION EVERY 8 HOURS
Status: DISCONTINUED | OUTPATIENT
Start: 2022-01-26 | End: 2022-01-26 | Stop reason: SDUPTHER

## 2022-01-26 RX ORDER — PSEUDOEPHEDRINE HYDROCHLORIDE 60 MG/1
60 TABLET ORAL
Status: DISCONTINUED | OUTPATIENT
Start: 2022-01-26 | End: 2022-01-28

## 2022-01-26 RX ORDER — FAMOTIDINE 20 MG/1
20 TABLET, FILM COATED ORAL EVERY 12 HOURS
Status: DISCONTINUED | OUTPATIENT
Start: 2022-01-26 | End: 2022-01-30 | Stop reason: HOSPADM

## 2022-01-26 RX ORDER — ONDANSETRON 2 MG/ML
8 INJECTION INTRAMUSCULAR; INTRAVENOUS
Status: DISCONTINUED | OUTPATIENT
Start: 2022-01-26 | End: 2022-01-27

## 2022-01-26 RX ORDER — CEFAZOLIN SODIUM/WATER 2 G/20 ML
2 SYRINGE (ML) INTRAVENOUS EVERY 8 HOURS
Status: DISCONTINUED | OUTPATIENT
Start: 2022-01-26 | End: 2022-01-27

## 2022-01-26 RX ORDER — LANOLIN ALCOHOL/MO/W.PET/CERES
3 CREAM (GRAM) TOPICAL
Status: DISCONTINUED | OUTPATIENT
Start: 2022-01-26 | End: 2022-01-26

## 2022-01-26 RX ADMIN — INDOMETHACIN 50 MG: 50 CAPSULE ORAL at 16:51

## 2022-01-26 RX ADMIN — INDOMETHACIN 50 MG: 50 CAPSULE ORAL at 13:39

## 2022-01-26 RX ADMIN — SODIUM CHLORIDE, SODIUM LACTATE, POTASSIUM CHLORIDE, AND CALCIUM CHLORIDE: 600; 310; 30; 20 INJECTION, SOLUTION INTRAVENOUS at 12:33

## 2022-01-26 RX ADMIN — CHLOROPROCAINE HYDROCHLORIDE 60 MG: 30 INJECTION, SOLUTION EPIDURAL; INFILTRATION; INTRACAUDAL; PERINEURAL at 12:44

## 2022-01-26 RX ADMIN — BUTORPHANOL TARTRATE 2 MG: 2 INJECTION, SOLUTION INTRAMUSCULAR; INTRAVENOUS at 15:25

## 2022-01-26 RX ADMIN — ONDANSETRON 4 MG: 2 INJECTION INTRAMUSCULAR; INTRAVENOUS at 12:51

## 2022-01-26 RX ADMIN — BUTORPHANOL TARTRATE 2 MG: 2 INJECTION, SOLUTION INTRAMUSCULAR; INTRAVENOUS at 18:46

## 2022-01-26 RX ADMIN — ONDANSETRON 8 MG: 2 INJECTION INTRAMUSCULAR; INTRAVENOUS at 20:18

## 2022-01-26 RX ADMIN — CEFAZOLIN SODIUM 2 G: 10 INJECTION, POWDER, FOR SOLUTION INTRAVENOUS at 20:18

## 2022-01-26 RX ADMIN — FAMOTIDINE 20 MG: 20 TABLET ORAL at 12:15

## 2022-01-26 RX ADMIN — FAMOTIDINE 20 MG: 20 TABLET ORAL at 20:18

## 2022-01-26 RX ADMIN — INDOMETHACIN 50 MG: 50 CAPSULE ORAL at 23:51

## 2022-01-26 RX ADMIN — SODIUM CHLORIDE, PRESERVATIVE FREE 5 ML: 5 INJECTION INTRAVENOUS at 14:55

## 2022-01-26 RX ADMIN — CEFAZOLIN SODIUM 2 G: 10 INJECTION, POWDER, FOR SOLUTION INTRAVENOUS at 12:15

## 2022-01-26 RX ADMIN — HYDROCODONE BITARTRATE AND ACETAMINOPHEN 2 TABLET: 5; 325 TABLET ORAL at 14:10

## 2022-01-26 RX ADMIN — INDOMETHACIN 50 MG: 50 CAPSULE ORAL at 06:36

## 2022-01-26 NOTE — OP NOTES
An Cervical Cerclage Operative Note    Pre-operative Diagnosis: Cervical Shortening in Twin Gestation with Prior  labor and delivery  Post-operative Diagnosis: Same as Above    Surgeon:  Christiano Gibbons MD     Anesthesia: Spinal    Procedure: An Cerclage    Indications:  Dakota Mcarthur presents with a clinical history consistent with cervical incompetence. Cervical cerclage was offered for treatment of cervical incompetence and the risks were explained in detail in the office and again in the hospital prior to surgery. These included risk of infection, bleeding, bladder and bowel damage and pregnancy loss, along with rupture of membranes now or later in pregnancy. These complications were all explained in detail and questions answered. It was explained to the patient that she had the option of expectant management without cerclage placement. She understood that her care and treatment would not be affected by her choice and there was no pressure on her to choose this course of treatment. After a long discussion and clear understanding by the patient, the patient consented to the procedure prior to coming to the hospital and, again on admission to the hospital.     Procedure Details:   Dakota Mcarthur was taken to the Operating Room where spinal anesthetic was administered. The patient was then placed in the dorsal lithotomy position. The vulva and distal vagina were then gently prepped with Betadine solution and draped in a sterile fashion to expose the vaginal introitus. The patient was then placed in Trendelenburg position to allow better visualization of the vaginal canal and the cervix. An I and O  catheter was used to drain 75ml clear urine from the bladder. Using retractors, the cervix and distal vagina were visualized. The vaginal portion of the cervical length was moderately shortened.   The external cervical os appeared to be open but the internal os was dilated 1 cm on manual exam. Fetal membranes were not visualized. The cervix and distal vagina were again gently washed carefully with Betadine solution and dried with sterile sponges. A long atraumatic Allis clamp was used to retract the cervix by grasping a significant portio of the cervix, carefully placed to avoid membranes at the 10 o'clock position. Using 5mm mersilene, the first bite of the An stitch was placed at the 12 o'clock position on the cervix at the junction of the vaginal mucosa and the portio of the cervix with the suture placed through the cervical stroma, careful to avoid cervical canal and amniotic sac, between mucosa and cervical canal.  Shallow bites placed in a purse string fashion with sequential grasping and releasing of the cervix with the Allis clamp to retract the cervical stroma and allow for placement of suture at the junction of the vaginal mucosa and the portio of the cervix. Care was used in grasping the cervix to be atraumatic and to cause as little of trauma and bleeding as possible. The last stitch ended at approximately the 12 o'clock again, needle cut and both sutures were gently retracted to take up any slack in the suture and a finger placed in the cervix and retraction of both ends of suture and internal os noted to be closed with suture tension. The suture was evaluated visually in all quadrants and felt to be at the junction of the vaginal mucosa and the portio of the cervix without including any sidewall and without including bladder or bowel. The suture was then tied with 5 square knots. The cervix was checked again and visualized and the cervix remained pink with no significantly bleeding, internal os palpated and was tightly closed. A 2-0 silk suture was place through both ends of suture aproximately 1 cm distal to knots and then tied. Cervix and vagina were again evaluated and no bleeding was noted. The cervix remained pink. Instruments and retractors were removed. Rectal exam was performed and no sutures were noted in the rectum. At the end of the procedure, sponge, instrument and needle counts were noted to be correct. Estimated Blood Loss:  10cc    Suture Type: 5mm Mersilene    Number of Sutures: 1    Findings:  Multiparous cervix with significant ripening of texture. Cervical dilation for gestational age. Uncomplicated cervical cerclage.        Signed By: Chandrakant Berry MD 1/26/2022

## 2022-01-26 NOTE — PROGRESS NOTES
Patient continuing to report intermitent 10/10 pain. Patient unable to void on bedpan. Straight cath for 150mL of urine. Stadol administered per order.

## 2022-01-26 NOTE — ANESTHESIA PROCEDURE NOTES
Spinal Block    Performed by: Alisson Navarro MD  Authorized by: Alisson Navarro MD     Pre-procedure: Indications: primary anesthetic  Preanesthetic Checklist: patient identified, risks and benefits discussed, anesthesia consent, patient being monitored and timeout performed    Timeout Time: 12:41 EST  Preanesthetic Checklist comment:  Risk of nerve damage discussed.     Spinal Block:   Patient Position:  Seated  Prep Region:  Lumbar  Prep: chlorhexidine and patient draped      Location:  L3-4  Technique:  Single shot    Local Dose (mL):  3    Needle:   Needle Type:  Pencan  Needle Gauge:  25 G  Attempts:  1      Events: CSF confirmed, no blood with aspiration and no paresthesia        Assessment:  Insertion:  Uncomplicated  Patient tolerance:  Patient tolerated the procedure well with no immediate complications

## 2022-01-26 NOTE — PROGRESS NOTES
Patient reports intermittent pain 7/10 that worsens when she needs to pee and relief from pain came with IV pain medication. Patient states she has already ambulated to the bathroom once. Assist to restroom for second time since cerclage. Pt able to void 350mL without difficulty and requires minimal assistance.

## 2022-01-26 NOTE — ANESTHESIA PREPROCEDURE EVALUATION
Relevant Problems   No relevant active problems       Anesthetic History   No history of anesthetic complications            Review of Systems / Medical History  Pertinent labs reviewed    Pulmonary          Smoker (former)         Neuro/Psych         Psychiatric history    Comments: Back pain Cardiovascular    Hypertension (gestational)              Exercise tolerance: >4 METS     GI/Hepatic/Renal  Within defined limits              Endo/Other        Obesity and anemia     Other Findings   Comments: Twin gestation         Physical Exam    Airway  Mallampati: II  TM Distance: 4 - 6 cm  Neck ROM: normal range of motion   Mouth opening: Normal     Cardiovascular  Regular rate and rhythm,  S1 and S2 normal,  no murmur, click, rub, or gallop             Dental  No notable dental hx       Pulmonary  Breath sounds clear to auscultation               Abdominal  GI exam deferred       Other Findings            Anesthetic Plan    ASA: 2  Anesthesia type: spinal            Anesthetic plan and risks discussed with: Patient

## 2022-01-26 NOTE — ANESTHESIA POSTPROCEDURE EVALUATION
Procedure(s):  CERCLAGE.    spinal    Anesthesia Post Evaluation        Patient location during evaluation: PACU  Patient participation: complete - patient participated  Level of consciousness: awake  Pain control: improving, pt recently received oral pain pill. Airway patency: patent  Anesthetic complications: no  Cardiovascular status: hemodynamically stable  Respiratory status: spontaneous ventilation  Hydration status: euvolemic  Post anesthesia nausea and vomiting:  none      INITIAL Post-op Vital signs:   Vitals Value Taken Time   /64 01/26/22 1415   Temp 36.1 °C (97 °F) 01/26/22 1314   Pulse 65 01/26/22 1415   Resp 20 01/26/22 1314   SpO2 98 % 01/26/22 1410   Vitals shown include unvalidated device data.

## 2022-01-26 NOTE — CONSULTS
Maternal Fetal Medicine Inpatient Consult      Chief Complaint:  Pregnancy and cervical shortening    History of Present Illness: 27 y.o.  at 18w6d gestation who presents with cervical shortening. Patient admitted with ultrasound findings concerning for cervical insufficiency and ripening. Plan to proceed with exam indicated cerclage. Presented to [facility] with contractions and discomfort. Found to have cervical shortening at that time. She complains of nothing this am.     Patient denies HA, abdominal pain, vision changes. No regular contractions, LOF, VB. Good FM. Minimal edema. No chest pain or shortness of breath. No significant reflux, nausea, constipation, or other GI complaints.      History:  OB History    Para Term  AB Living   9 4 1 3 4 3   SAB IAB Ectopic Molar Multiple Live Births   4 0 0 0 0 3      # Outcome Date GA Lbr Timoteo/2nd Weight Sex Delivery Anes PTL Lv   9 Current            8  21 34w6d 02:04 4 lb 8.5 oz (2.055 kg) F Vag-Spont EPI Y ZAYRA      Complications: Fetal Intolerance   7 SAB 20 12w0d             Birth Comments: Trisomy 18   6  19 30w0d   F Vag-Spont  N ZAYRA      Complications: Pre-eclampsia   5 SAB  8w0d             Birth Comments: D&C required   4 SAB  8w0d             Birth Comments: D&C required   3 SAB 2015 8w0d             Birth Comments: D&C required   2  14 19w0d  1.8 oz (0.05 kg) U VAGINAL DELI None N FD      Birth Comments: [facility] early in pregnancy      Complications: Abruptio Placenta   1 Term 09/09/10 39w0d  6 lb 7 oz (2.92 kg) M Vag-Spont EPIDURAL AN N ZAYRA      Birth Comments: PreE       Past Surgical History:   Procedure Laterality Date    HX GYN      HX OTHER SURGICAL      D&C     HX OTHER SURGICAL      D&C        Past Medical History:   Diagnosis Date    Abnormal genetic test during pregnancy 10/26/2020    G6 - NIPT elevated risk for trisomy 18, missed AB with suction D&C    Anemia     Genital herpes simplex 3/22/2018    Hypertension     gestational BP    Missed ab 2/10/15    Postpartum depression 6/15/2021    Preeclampsia      delivery     Subchorionic bleed 2014    Vulvovaginal rash 3/9/2021       Family History   Problem Relation Age of Onset    Asthma Father     Diabetes Father     Bleeding Prob Brother         Brain    Diabetes Brother     Other Brother         brain tumor    Other Paternal Grandmother         Lung cancer    Other Mother         cervical cancer    Other Maternal Grandmother         cervical cancer    Breast Cancer Neg Hx     Uterine Cancer Neg Hx     Ovarian Cancer Neg Hx     Collagen Dis Neg Hx     Colon Cancer Neg Hx        Social History     Socioeconomic History    Marital status: SINGLE     Spouse name: Not on file    Number of children: Not on file    Years of education: Not on file    Highest education level: Not on file   Occupational History    Not on file   Tobacco Use    Smoking status: Former Smoker     Packs/day: 0.25     Years: 0.50     Pack years: 0.12     Quit date: 2020     Years since quittin.3    Smokeless tobacco: Never Used   Vaping Use    Vaping Use: Never used   Substance and Sexual Activity    Alcohol use: No    Drug use: No    Sexual activity: Yes     Partners: Male   Other Topics Concern     Service Not Asked    Blood Transfusions Not Asked    Caffeine Concern No    Occupational Exposure Not Asked    Hobby Hazards Not Asked    Sleep Concern Not Asked    Stress Concern Not Asked    Weight Concern Not Asked    Special Diet Not Asked    Back Care Not Asked    Exercise No    Bike Helmet Not Asked    Seat Belt Yes    Self-Exams Yes   Social History Narrative    Abuse: Feels safe at home, no history of physical abuse, no history of sexual abuse     Social Determinants of Health     Financial Resource Strain:     Difficulty of Paying Living Expenses: Not on file   Food Insecurity:     Worried About Running Out of Food in the Last Year: Not on file    Efrain of Food in the Last Year: Not on file   Transportation Needs:     Lack of Transportation (Medical): Not on file    Lack of Transportation (Non-Medical):  Not on file   Physical Activity:     Days of Exercise per Week: Not on file    Minutes of Exercise per Session: Not on file   Stress:     Feeling of Stress : Not on file   Social Connections:     Frequency of Communication with Friends and Family: Not on file    Frequency of Social Gatherings with Friends and Family: Not on file    Attends Gnosticism Services: Not on file    Active Member of [address] [place] [address] [employer] or Organizations: Not on file    Attends Club or Organization Meetings: Not on file    Marital Status: Not on file   Intimate Partner Violence:     Fear of Current or Ex-Partner: Not on file    Emotionally Abused: Not on file    Physically Abused: Not on file    Sexually Abused: Not on file   Housing Stability:     Unable to Pay for Housing in the Last Year: Not on file    Number of [place] in the Last Year: Not on file    Unstable Housing in the Last Year: Not on file       Allergies   Allergen Reactions    Bactrim [Sulfamethoprim Ds] Nausea and Vomiting       Current Facility-Administered Medications:     indomethacin (INDOCIN) capsule 50 mg, 50 mg, Oral, Q6H, Vira Olea MD, 50 mg at 01/26/22 0636    sodium chloride (NS) flush 5-40 mL, 5-40 mL, IntraVENous, Q8H, Vira Olea MD    sodium chloride (NS) flush 5-40 mL, 5-40 mL, IntraVENous, PRN, Vira Olea MD    acetaminophen (TYLENOL) tablet 650 mg, 650 mg, Oral, Q4H PRN, Vira Olea MD, 650 mg at 01/25/22 1822    promethazine (PHENERGAN) tablet 25 mg, 25 mg, Oral, Q6H PRN, Vira Olea MD    hydrOXYzine HCL (ATARAX) tablet 25 mg, 25 mg, Oral, TID PRN, Vira Olea MD    diphenhydrAMINE (BENADRYL) capsule 25 mg, 25 mg, Oral, QHS PRN, Vira Olea MD    zolpidem (AMBIEN) tablet 5 mg, 5 mg, Oral, QHS PRN, Porsche Zarco, DO, 5 mg at 22 3257    Assessment and Plan:  27 y.o.  at 18w6d with Cervical Insufficiency. 1) Cervical Shortening and Ripening increase risk for  delivery. Cervical length <2.5cm is considered >2 standard deviations below the expected mid-trimester length. A cervical length of less than 2.5 cm in a patient with a previous  delivery has a positive predictive value of 73-80% for recurrent  birth. Zechariah Guzmán et al., [place], 2004). Patient noted to have cervical shortening and ripening on ultrasound, and cervical effacement and softening on exam. I feel the patient has developing cervical incompetence. She is at high risk for  labor, PPROM and delivery. I discussed the option of cerclage placement if cervical morphology and length worsen verses expectant management with serial cervical lengths. Risks of cerclage placement were given including bleeding, infection, PPROM and loss of the pregnancy. All questions were answered. Use of cervical cerclage in twin gestation reviewed. While not appropriate for prophylactic use, benefit is seen in those with cervical shortening. Patient wants to have cerclage placed and we will proceed with the procedure. Will do perioperative indocin course. Work and activity limitations reviewed. Recommend sedentary job at this time. If unable to perform a job with these limitations, then she should come out of work for duration. OB office to complete any STD/FMLA paperwork. Will not do progesterone supplementation as twin gestation. 2) Di/Di twin pregnancy- normal limited this am.     3) Short interval pregnancy. 4) Hx  PreE. 5) SW to see for resources for support- emotional and financial.     Will plan for patient to remain in house at this time due to severity of shortening at this very early age.        Albertina Aldridge MD

## 2022-01-27 PROBLEM — K59.00 CONSTIPATION: Status: ACTIVE | Noted: 2022-01-27

## 2022-01-27 LAB
BACTERIA SPEC CULT: NORMAL
BACTERIA SPEC CULT: NORMAL
C TRACH RRNA SPEC QL NAA+PROBE: NEGATIVE
N GONORRHOEA RRNA SPEC QL NAA+PROBE: NEGATIVE
SERVICE CMNT-IMP: NORMAL
SPECIMEN SOURCE: NORMAL

## 2022-01-27 PROCEDURE — 99226 PR SBSQ OBSERVATION CARE/DAY 35 MINUTES: CPT | Performed by: OBSTETRICS & GYNECOLOGY

## 2022-01-27 PROCEDURE — 74011250637 HC RX REV CODE- 250/637: Performed by: OBSTETRICS & GYNECOLOGY

## 2022-01-27 PROCEDURE — 77030040361 HC SLV COMPR DVT MDII -B

## 2022-01-27 PROCEDURE — 99214 OFFICE O/P EST MOD 30 MIN: CPT | Performed by: OBSTETRICS & GYNECOLOGY

## 2022-01-27 PROCEDURE — 74011000250 HC RX REV CODE- 250: Performed by: OBSTETRICS & GYNECOLOGY

## 2022-01-27 PROCEDURE — G0378 HOSPITAL OBSERVATION PER HR: HCPCS

## 2022-01-27 PROCEDURE — 2709999900 HC NON-CHARGEABLE SUPPLY

## 2022-01-27 PROCEDURE — 74011250636 HC RX REV CODE- 250/636: Performed by: OBSTETRICS & GYNECOLOGY

## 2022-01-27 RX ORDER — DOCUSATE SODIUM 100 MG/1
100 CAPSULE, LIQUID FILLED ORAL 2 TIMES DAILY
Status: DISCONTINUED | OUTPATIENT
Start: 2022-01-27 | End: 2022-01-28

## 2022-01-27 RX ORDER — OXYCODONE HYDROCHLORIDE 5 MG/1
5 TABLET ORAL
Status: DISCONTINUED | OUTPATIENT
Start: 2022-01-27 | End: 2022-01-28

## 2022-01-27 RX ORDER — POLYETHYLENE GLYCOL 3350 17 G/17G
17 POWDER, FOR SOLUTION ORAL
Status: DISCONTINUED | OUTPATIENT
Start: 2022-01-27 | End: 2022-01-28

## 2022-01-27 RX ORDER — NIFEDIPINE 10 MG/1
20 CAPSULE ORAL
Status: DISCONTINUED | OUTPATIENT
Start: 2022-01-27 | End: 2022-01-30 | Stop reason: HOSPADM

## 2022-01-27 RX ORDER — ZOLPIDEM TARTRATE 5 MG/1
5 TABLET ORAL
Status: DISCONTINUED | OUTPATIENT
Start: 2022-01-27 | End: 2022-01-30 | Stop reason: HOSPADM

## 2022-01-27 RX ORDER — POLYETHYLENE GLYCOL 3350 17 G/17G
17 POWDER, FOR SOLUTION ORAL
Status: DISCONTINUED | OUTPATIENT
Start: 2022-01-27 | End: 2022-01-27

## 2022-01-27 RX ORDER — BUTALBITAL, ACETAMINOPHEN AND CAFFEINE 50; 325; 40 MG/1; MG/1; MG/1
1 TABLET ORAL
Status: DISCONTINUED | OUTPATIENT
Start: 2022-01-27 | End: 2022-01-30 | Stop reason: HOSPADM

## 2022-01-27 RX ORDER — LANOLIN ALCOHOL/MO/W.PET/CERES
400 CREAM (GRAM) TOPICAL 2 TIMES DAILY
Status: DISCONTINUED | OUTPATIENT
Start: 2022-01-27 | End: 2022-01-30 | Stop reason: HOSPADM

## 2022-01-27 RX ADMIN — INDOMETHACIN 50 MG: 50 CAPSULE ORAL at 12:57

## 2022-01-27 RX ADMIN — HYDROCODONE BITARTRATE AND ACETAMINOPHEN 2 TABLET: 5; 325 TABLET ORAL at 10:58

## 2022-01-27 RX ADMIN — INDOMETHACIN 50 MG: 50 CAPSULE ORAL at 23:47

## 2022-01-27 RX ADMIN — INDOMETHACIN 50 MG: 50 CAPSULE ORAL at 06:11

## 2022-01-27 RX ADMIN — SODIUM CHLORIDE, PRESERVATIVE FREE 10 ML: 5 INJECTION INTRAVENOUS at 18:20

## 2022-01-27 RX ADMIN — FAMOTIDINE 20 MG: 20 TABLET ORAL at 09:14

## 2022-01-27 RX ADMIN — MAGNESIUM GLUCONATE 500 MG ORAL TABLET 400 MG: 500 TABLET ORAL at 17:21

## 2022-01-27 RX ADMIN — DOCUSATE SODIUM 100 MG: 100 CAPSULE ORAL at 17:37

## 2022-01-27 RX ADMIN — BUTALBITAL, ACETAMINOPHEN, AND CAFFEINE 1 TABLET: 50; 325; 40 TABLET ORAL at 17:21

## 2022-01-27 RX ADMIN — POLYETHYLENE GLYCOL 3350 17 G: 17 POWDER, FOR SOLUTION ORAL at 21:15

## 2022-01-27 RX ADMIN — POLYETHYLENE GLYCOL 3350 17 G: 17 POWDER, FOR SOLUTION ORAL at 10:48

## 2022-01-27 RX ADMIN — ZOLPIDEM TARTRATE 5 MG: 5 TABLET ORAL at 21:15

## 2022-01-27 RX ADMIN — INDOMETHACIN 50 MG: 50 CAPSULE ORAL at 17:36

## 2022-01-27 RX ADMIN — ACETAMINOPHEN 650 MG: 325 TABLET, FILM COATED ORAL at 14:08

## 2022-01-27 RX ADMIN — POLYETHYLENE GLYCOL 3350 17 G: 17 POWDER, FOR SOLUTION ORAL at 03:06

## 2022-01-27 RX ADMIN — ONDANSETRON 8 MG: 2 INJECTION INTRAMUSCULAR; INTRAVENOUS at 03:25

## 2022-01-27 RX ADMIN — NIFEDIPINE 20 MG: 10 CAPSULE ORAL at 10:48

## 2022-01-27 RX ADMIN — FAMOTIDINE 20 MG: 20 TABLET ORAL at 21:15

## 2022-01-27 RX ADMIN — FLUTICASONE PROPIONATE 2 SPRAY: 50 SPRAY, METERED NASAL at 09:29

## 2022-01-27 RX ADMIN — CEFAZOLIN SODIUM 2 G: 10 INJECTION, POWDER, FOR SOLUTION INTRAVENOUS at 03:24

## 2022-01-27 NOTE — PROGRESS NOTES
[facility] Antepartum Progress Note    27 y.o.  at 19w0d by Estimated Date of Delivery: 22. Patient admitted for cervical incompetence. Hospital Day 2. She complains of low back/rectal pressure and crampiness. ? Constipation likely    Patient denies HA, abdominal pain, vision changes. No regular contractions, LOF, VB. Good FM. Minimal edema, which is stable. No chest pain or shortness of breath. No significant reflux, nausea, constipation, or other GI complaints. Review of Systems: per HPI, otherwise unremarkable.      Current Facility-Administered Medications:     polyethylene glycol (MIRALAX) packet 17 g, 17 g, Oral, Q8H PRN, Mahogany Etienne MD    NIFEdipine (PROCARDIA) capsule 20 mg, 20 mg, Oral, Q6H PRN, Mahogany Etienne MD    famotidine (PEPCID) tablet 20 mg, 20 mg, Oral, Q12H, Mahogany Etienne MD, 20 mg at 22 0914    HYDROcodone-acetaminophen (NORCO) 5-325 mg per tablet 2 Tablet, 2 Tablet, Oral, Q6H PRN, Mahogany Etienne MD, 2 Tablet at 22 1410    melatonin tablet 3 mg, 3 mg, Oral, QHS PRN, Mahogany Etienne MD    sodium chloride (OCEAN) 0.65 % nasal squeeze bottle 2 Spray, 2 Spray, Both Nostrils, Q2H PRN, Mahogany Etienne MD    pseudoephedrine (SUDAFED) tablet 60 mg, 60 mg, Oral, Q6H PRN, Mahogany Etienne MD    fluticasone propionate (FLONASE) 50 mcg/actuation nasal spray 2 Spray, 2 Spray, Both Nostrils, DAILY, Mahogany Etienne MD, 2 [place] at 22 0929    indomethacin (INDOCIN) capsule 50 mg, 50 mg, Oral, Q6H, Mahogany Etienne MD, 50 mg at 22 9041    sodium chloride (NS) flush 5-40 mL, 5-40 mL, IntraVENous, Q8H, Deyvi Deshpande MD, 5 mL at 22 1455    sodium chloride (NS) flush 5-40 mL, 5-40 mL, IntraVENous, PRN, Deyvi Deshpande MD    acetaminophen (TYLENOL) tablet 650 mg, 650 mg, Oral, Q4H PRN, Deyvi Deshpande MD, 650 mg at 22    promethazine (PHENERGAN) tablet 25 mg, 25 mg, Oral, Q6H PRN, Deyvi Deshpande MD    hydrOXYzine HCL (ATARAX) tablet 25 mg, 25 mg, Oral, TID PRN, Winter White MD    diphenhydrAMINE (BENADRYL) capsule 25 mg, 25 mg, Oral, QHS PRN, Winter White MD    Vitals:    Patient Vitals for the past 24 hrs:   BP   22 0920 131/76   22 0307 128/61   22 0035 114/66   22 102/64   22 1522 (!) 111/59   22 1400 114/63   22 1345 (!) 111/55   22 1330 (!) 107/54   22 1325 (!) 104/53   22 1321 (!) 106/58   22 1316 (!) 97/55   22 1314 (!) 110/50   22 1312 (!) 110/50     Temp (24hrs), Av.8 °F (36.6 °C), Min:97 °F (36.1 °C), Max:98.6 °F (37 °C)      I&O:  No intake/output data recorded.  1901 -  0700  In: 500 [I.V.:500]  Out: 1600 [Urine:1600]    Exam:   Constitutional: Patient without distress. HEENT: Symmetric without facial palsy, uncorrected poor hearing or uncorrected poor vision. No thyroid enlargement or goiter  Chest: No use of accessory muscles or excessive work of breathing  Abdomen: gravid, soft, non-tender without masses. Fundus: soft and non tender  Genitourinary: deferred as without complaints of labor or ROM  Cervical Exam:      Membranes: Membrane Status: Intact  Lower Extremities:  Edema: No bilaterally  Skin: normal coloration and turgor, no rashes, no suspicious skin lesions noted. Neurologic: AOx3. Gait normal. Reflexes and motor strength normal and symmetric. Cranial nerves 2-12 and sensation grossly intact.   Psychiatric: non focal    Maternal and Fetal Monitoring:                              Uterine Activity:      Fetal Heart Rate: Mode: DopplerFetal Heart Rate: 150           Labs:   Recent Labs     22  1720 12/10/21  0947 12/10/21  0946 21  2233 21  0616 21  1649 21  1545 21  1454 21  1344 21  1344 21  1545 21  1545   WBC 6.2  --  4.6 6.9 5.8 8.8  --  7.0   < > 5.6   < > 4.8   HGB 10.7*  --  11.6 10.9* 9.2* 10.4*  --  12.3   < > 11.1*   < > 10.7*     -- 266 244 179 180  --  209   < > 201   < > 201   URICA  --  2.7  --   --  4.0 4.0  --  3.8  --  3.4  --  3.2   LDH  --  197  --   --  231* 366*  --  292*  --  213*  --  249*   PUQ  --   --   --   --   --   --  370  --   --   --   --   --     < > = values in this interval not displayed. Assessment and Recommendations:  27 y.o.  at 19w0d    Patient Active Problem List    Diagnosis Date Noted    Multigravida in first trimester 2021    Dichorionic diamniotic twin pregnancy in second trimester 2021    History of pre-eclampsia in prior pregnancy, currently pregnant in first trimester 2021    Short interval between pregnancies affecting pregnancy, antepartum 2021    Herpes simplex virus type 2 (HSV-2) infection affecting pregnancy in first trimester 2021    History of postpartum depression 06/15/2021    Low weight gain during pregnancy in second trimester 2022    Back pain affecting pregnancy in second trimester 2022    Short cervix during pregnancy in second trimester 2022    Short cervix in second trimester, antepartum 2022    Habitual aborter, currently pregnant 2022      With  contractions, will begin PRN procardia 20mg po. Continue indocin course. Constipation treatment. Continue inpatient monitoring at this time. Plan discharge once stability assured. Citlali Wright MD  2022     Time:35    Minutes spent on floor,with greater than 50% of the time examining patient, explaining plan and coordinating care with nurse and requesting primary physician.

## 2022-01-27 NOTE — PROGRESS NOTES
SBAR received from [employer], patient care assumed. Patient and RN covered POC for this shift with teachback achieved. Denies needs, will call out PRN.

## 2022-01-27 NOTE — PROGRESS NOTES
AntePartum High Risk Pregnancy Note    Rodolfo Cool [facility]  19w0d      Patient is a 31yo G9  admitted for cervical incompetence, POD1 from [facility] now at 19w0d Estimated Date of Delivery: 22 with Rob Twins. Improved cramping. Still with some spotting. Denies any issues currently.  +constipation. Vitals:    Patient Vitals for the past 24 hrs:   BP Temp Pulse Resp SpO2   22 1408 (!) 113/54 98 °F (36.7 °C) 81 16    22 1140 (!) 121/59 98 °F (36.7 °C) 79 16    22 0920 131/76 98.2 °F (36.8 °C) 78 18    22 0307 128/61  73     22 0035 114/66  70     22 102/64 98.6 °F (37 °C) 60 16 98 %     Temp (24hrs), Av.2 °F (36.8 °C), Min:98 °F (36.7 °C), Max:98.6 °F (37 °C)    No intake/output data recorded.  1901 -  0700  In: 500 [I.V.:500]  Out: 1600 [Urine:1600]    Physical Exam:  Constitutional: She appears well-developed and well-nourished. No distress. HENT:    Head: Normocephalic and atraumatic. Lungs: CTAB, effort normal, no rales/crackles  Cardiovascular: RRR, no M/R/G  Abd:  Gravid, no RUQ TTP  Skin: She is not diaphoretic. Psychiatric: She has a normal mood and affect. Her behavior is normal. Thought content normal.               Labs: No results found for this or any previous visit (from the past 24 hour(s)). Assessment and Plan:    31yo  at 19w0d with cervical incompetence, s/p cerclage, Rob Twins:    -mfm following  -PRN Procardia   -Indocin  -Miralax, colace, mag oxide  -SCDs        Patient counseled face to face for more than 50% of the total time spent with the patient. On this date I have spent 27 minutes reviewing previous notes, test results, and face to face with the patient discussing the diagnosis and importance of compliance with the treatment plan as well as documenting.            Patient Active Problem List    Diagnosis Date Noted    Back pain affecting pregnancy in second trimester 01/25/2022    Short cervix during pregnancy in second trimester 01/25/2022    Short cervix in second trimester, antepartum 01/25/2022    Habitual aborter, currently pregnant 01/11/2022    Low weight gain during pregnancy in second trimester 01/07/2022    Multigravida in first trimester 11/12/2021    Dichorionic diamniotic twin pregnancy in second trimester 11/12/2021    History of pre-eclampsia in prior pregnancy, currently pregnant in first trimester 11/12/2021    Short interval between pregnancies affecting pregnancy, antepartum 11/12/2021    Herpes simplex virus type 2 (HSV-2) infection affecting pregnancy in first trimester 11/12/2021    History of postpartum depression 06/15/2021          Yovani Storm MD

## 2022-01-28 PROCEDURE — 99231 SBSQ HOSP IP/OBS SF/LOW 25: CPT | Performed by: OBSTETRICS & GYNECOLOGY

## 2022-01-28 PROCEDURE — 74011250637 HC RX REV CODE- 250/637: Performed by: OBSTETRICS & GYNECOLOGY

## 2022-01-28 PROCEDURE — G0378 HOSPITAL OBSERVATION PER HR: HCPCS

## 2022-01-28 PROCEDURE — 99226 PR SBSQ OBSERVATION CARE/DAY 35 MINUTES: CPT | Performed by: OBSTETRICS & GYNECOLOGY

## 2022-01-28 RX ORDER — HYDROCORTISONE 25 MG/G
CREAM TOPICAL
Status: DISCONTINUED | OUTPATIENT
Start: 2022-01-28 | End: 2022-01-30 | Stop reason: HOSPADM

## 2022-01-28 RX ORDER — POLYETHYLENE GLYCOL 3350 17 G/17G
17 POWDER, FOR SOLUTION ORAL EVERY 8 HOURS
Status: DISCONTINUED | OUTPATIENT
Start: 2022-01-28 | End: 2022-01-30 | Stop reason: HOSPADM

## 2022-01-28 RX ORDER — ADHESIVE BANDAGE
30 BANDAGE TOPICAL DAILY PRN
Status: DISCONTINUED | OUTPATIENT
Start: 2022-01-28 | End: 2022-01-30 | Stop reason: HOSPADM

## 2022-01-28 RX ORDER — ADHESIVE BANDAGE
30 BANDAGE TOPICAL DAILY PRN
Status: DISCONTINUED | OUTPATIENT
Start: 2022-01-28 | End: 2022-01-28

## 2022-01-28 RX ORDER — DOCUSATE SODIUM 100 MG/1
200 CAPSULE, LIQUID FILLED ORAL 2 TIMES DAILY
Status: DISCONTINUED | OUTPATIENT
Start: 2022-01-28 | End: 2022-01-30 | Stop reason: HOSPADM

## 2022-01-28 RX ORDER — GLYCERIN ADULT
1 SUPPOSITORY, RECTAL RECTAL
Status: COMPLETED | OUTPATIENT
Start: 2022-01-29 | End: 2022-01-29

## 2022-01-28 RX ADMIN — ZOLPIDEM TARTRATE 5 MG: 5 TABLET ORAL at 22:57

## 2022-01-28 RX ADMIN — FAMOTIDINE 20 MG: 20 TABLET ORAL at 08:09

## 2022-01-28 RX ADMIN — ACETAMINOPHEN 650 MG: 325 TABLET, FILM COATED ORAL at 19:29

## 2022-01-28 RX ADMIN — MAGNESIUM HYDROXIDE 30 ML: 2400 SUSPENSION ORAL at 12:49

## 2022-01-28 RX ADMIN — INDOMETHACIN 50 MG: 50 CAPSULE ORAL at 05:55

## 2022-01-28 RX ADMIN — MAGNESIUM GLUCONATE 500 MG ORAL TABLET 400 MG: 500 TABLET ORAL at 08:08

## 2022-01-28 RX ADMIN — WITCH HAZEL 1 PAD: 500 SOLUTION RECTAL; TOPICAL at 12:49

## 2022-01-28 RX ADMIN — DOCUSATE SODIUM 100 MG: 100 CAPSULE ORAL at 08:09

## 2022-01-28 RX ADMIN — MAGNESIUM GLUCONATE 500 MG ORAL TABLET 400 MG: 500 TABLET ORAL at 18:00

## 2022-01-28 RX ADMIN — POLYETHYLENE GLYCOL 3350 17 G: 17 POWDER, FOR SOLUTION ORAL at 12:49

## 2022-01-28 RX ADMIN — DOCUSATE SODIUM 200 MG: 100 CAPSULE, LIQUID FILLED ORAL at 18:00

## 2022-01-28 RX ADMIN — INDOMETHACIN 50 MG: 50 CAPSULE ORAL at 18:00

## 2022-01-28 RX ADMIN — INDOMETHACIN 50 MG: 50 CAPSULE ORAL at 12:13

## 2022-01-28 NOTE — PROGRESS NOTES
Ante Partum High Risk Pregnancy Note  Karen Gaines  [id number]    Patient admitted for cervical incompetence. Pt denies cramping. Can't appreciate fetal movement yet. Received indocin around 5am. Has procardia ordered prn. Vitals:  Patient Vitals for the past 24 hrs:   BP Temp Pulse Resp   22 0806 (!) 116/57 98.5 °F (36.9 °C) 68 18   22 0556 (!) 120/59  83    22 1931 120/60 98.5 °F (36.9 °C) 75    22 1653 129/73  79    22 1413 (!) 113/54  81    22 1408 (!) 113/54 98 °F (36.7 °C) 81 16   22 1140 (!) 121/59 98 °F (36.7 °C) 79 16   22 1102 (!) 121/59  79      Temp (24hrs), Av.3 °F (36.8 °C), Min:98 °F (36.7 °C), Max:98.5 °F (36.9 °C)    I&O:  No intake/output data recorded.  1901 -  0700  In: -   Out: 1100 [Urine:1100]    Exam:  Patient without distress. Abdomen soft, non-tender               Fundus soft and non tender                          Labs: No results found for this or any previous visit (from the past 24 hour(s)).     Assessment:    Patient Active Problem List    Diagnosis Date Noted    Constipation 2022    Back pain affecting pregnancy in second trimester 2022    Short cervix during pregnancy in second trimester 2022    Short cervix in second trimester, antepartum 2022    Habitual aborter, currently pregnant 2022    Low weight gain during pregnancy in second trimester 2022    Multigravida in first trimester 2021    Dichorionic diamniotic twin pregnancy in second trimester 2021    History of pre-eclampsia in prior pregnancy, currently pregnant in first trimester 2021    Short interval between pregnancies affecting pregnancy, antepartum 2021    Herpes simplex virus type 2 (HSV-2) infection affecting pregnancy in first trimester 2021    History of postpartum depression 06/15/2021       Plan:    Pt doing well- stable. Continue in house observation.

## 2022-01-28 NOTE — PROGRESS NOTES
[facility] Antepartum Progress Note    27 y.o.  at 19w1d by Estimated Date of Delivery: 22. Patient admitted for cervical incompetence. She complains of continued significant constipation and cramping. Patient denies HA, abdominal pain, vision changes. No regular contractions, LOF, VB. Good FM. Minimal edema. No chest pain or shortness of breath. No significant reflux, nausea, constipation, or other GI complaints. ROS per HPI, otherwise unremarkable.       Current Facility-Administered Medications:     polyethylene glycol (MIRALAX) packet 17 g, 17 g, Oral, Q8H, Sonia Etienne MD    hydrocortisone (ANUSOL-HC) 2.5 % rectal cream, , PeriANAL, QID PRN, Sonia Etienne MD    magnesium hydroxide (MILK OF MAGNESIA) 400 mg/5 mL oral suspension 30 mL, 30 mL, Oral, DAILY PRN, Sonia Etienne MD    witch hazel-glycerin (TUCKS) 12.5-50 % pads 1 Pad, 1 Pad, PeriANAL, PRN, Sonia Etienne MD    docusate sodium (COLACE) capsule 200 mg, 200 mg, Oral, BID, Sonia Etienne MD    NIFEdipine (PROCARDIA) capsule 20 mg, 20 mg, Oral, Q6H PRN, Sonia Etienne MD, 20 mg at 22 1048    magnesium oxide (MAG-OX) tablet 400 mg, 400 mg, Oral, BID, Michelle Morales MD, 400 mg at 22 0808    butalbital-acetaminophen-caffeine (FIORICET, ESGIC) -40 mg per tablet 1 Tablet, 1 Tablet, Oral, Q6H PRN, Aftab BURCH MD, 1 Tablet at 22 1721    zolpidem (AMBIEN) tablet 5 mg, 5 mg, Oral, QHS PRN, Michelle Morales MD, 5 mg at 22 2115    famotidine (PEPCID) tablet 20 mg, 20 mg, Oral, Q12H, Sonia Etienne MD, 20 mg at 22 0809    melatonin tablet 3 mg, 3 mg, Oral, QHS PRN, Sonia Etienne MD    sodium chloride (OCEAN) 0.65 % nasal squeeze bottle 2 Spray, 2 Spray, Both Nostrils, Q2H PRN, Sonia Etienne MD    fluticasone propionate (FLONASE) 50 mcg/actuation nasal spray 2 Spray, 2 Spray, Both Nostrils, DAILY, Sonia Etienne MD, 2 [place] at 22 0929    indomethacin (INDOCIN) capsule 50 mg, 50 mg, Oral, Q6H, Geovany Etienne MD, 50 mg at 22 1213    sodium chloride (NS) flush 5-40 mL, 5-40 mL, IntraVENous, Q8H, Yasmin Lozoya MD, 10 mL at 22 1820    sodium chloride (NS) flush 5-40 mL, 5-40 mL, IntraVENous, PRN, Yasmin Lozoya MD    acetaminophen (TYLENOL) tablet 650 mg, 650 mg, Oral, Q4H PRN, Yasmin Lozoya MD, 650 mg at 22 1408    promethazine (PHENERGAN) tablet 25 mg, 25 mg, Oral, Q6H PRN, Yasmin Lozoya MD    hydrOXYzine HCL (ATARAX) tablet 25 mg, 25 mg, Oral, TID PRN, Yasmin Lozoya MD    diphenhydrAMINE (BENADRYL) capsule 25 mg, 25 mg, Oral, QHS PRN, Ellard Place, MD    Vitals:    Patient Vitals for the past 24 hrs:   BP   22 0806 (!) 116/57   22 0556 (!) 120/59   22 1931 120/60   22 1653 129/73   22 1413 (!) 113/54   22 1408 (!) 113/54     Temp (24hrs), Av.3 °F (36.8 °C), Min:98 °F (36.7 °C), Max:98.5 °F (36.9 °C)      I&O:  No intake/output data recorded.  1901 -  0700  In: -   Out: 1100 [Urine:1100]    Exam:   Constitutional: Patient without distress. HEENT: Symmetric without facial palsy, uncorrected poor hearing or uncorrected poor vision. No thyroid enlargement or goiter  Chest: No use of accessory muscles or excessive work of breathing  Abdomen:Fundus: soft and non tender  Genitourinary: deferred as without complaints of labor or ROM  Cervical Exam:      Membranes: Membrane Status: Intact  Lower Extremities:  Edema: No bilaterally  Skin: normal coloration and turgor, no rashes, no suspicious skin lesions noted. Neurologic: AOx3. Gait normal. Reflexes and motor strength normal and symmetric. Cranial nerves 2-12 and sensation grossly intact.   Psychiatric: non focal    Maternal and Fetal Monitoring:                              Uterine Activity:                     Fetal Heart Rate: Mode: DopplerFetal Heart Rate: 150       Labs:  CBC:    Recent Labs     22  1720 12/10/21  0946 213 2116 21  1454 21  1344 21  1545   WBC 6.2 4.6 6.9 5.8 8.8 7.0 5.6 4.8   HGB 10.7* 11.6 10.9* 9.2* 10.4* 12.3 11.1* 10.7*   HCT 32.1* 35.3 32.8* 27.1* 30.7* 36.3 33.5* 31.9*    266 244 179 180 209 201 201       CMP:   Recent Labs     12/10/21  0947 21  1454 21  1344 21  1545    137 137 137 135* 135* 136   K 4.7 3.8 3.9 4.2 4.0 3.7 4.0    107 110* 107 105 106 107   CO2 22 24 25 24 25 25 23   AGAP  --  6* 2* 6* 5* 4* 6*   GLU 83 82 81 89 66 106* 88   BUN 6 6 10 9 8 7 7   CREA 0.68 0.76 0.57* 0.73 0.63 0.66 0.51*   GFRAA 138 >60 >60 >60 >60 >60 >60   GFRNA 119 >60 >60 >60 >60 >60 >60   CA 9.1 9.2 8.4 8.7 9.3 8.8 8.3   ALB 3.8* 3.5 2.3* 2.8* 2.9* 2.7* 2.6*   TP 6.4 7.4 5.8* 6.9 7.2 6.4 5.8*   GLOB  --  3.9* 3.5 4.1* 4.3* 3.7* 3.2   AGRAT 1.5 0.9* 0.7* 0.7* 0.7* 0.7* 0.8*   ALT 10 20 17 23 26 24 20       Recent Labs     12/10/21  0947 2116 21  1545 21  1454 21  1344 21  1545   URICA 2.7 4.0 4.0  --  3.8 3.4 3.2    231* 366*  --  292* 213* 249*   PUQ  --   --   --  370  --   --   --        Recent Glucose Results:   Recent Labs     12/10/21  0947 21  2233 21  0616 21  1649 21  1454 21  1344 21  1545   GLU 83 82 81 89 66 106* 88         A/P: 27 y.o.  at 19w1d    Principal Problem:    Short cervix during pregnancy in second trimester (2022)    Active Problems:    Back pain affecting pregnancy in second trimester (2022)      Short cervix in second trimester, antepartum (2022)      Constipation (2022)    POD2 s/p emergency cerclage in twin pregnancy. Tolerating indocin. Has periods of contractions and pressure. Will aggressively treat constipation. Plan dc home this weekend dependent upon stability.      Time:35    Minutes spent on floor,with greater than 50% of the time examining patient, explaining plan and coordinating care with nurse and requesting primary physician.

## 2022-01-28 NOTE — PROGRESS NOTES
AM assessment complete, ice pitcher refilled, fluids encouraged. Pt denies vaginal bleeding, leakage of fluid, and feelings of cramping. FHR via EFM Twin A- 140's, Twin B-130's. Denies needs.

## 2022-01-28 NOTE — PROGRESS NOTES
SBAR received from Alexander Carlos RN. Care assumed. 1- RN to bedside. Shift assessment complete. Pt in bed resting. VSS. Denies VB, LOF, CTX. Reports some vaginal pressure when voiding. Still no BM. Reports HA relieved with Fioricet.

## 2022-01-29 PROCEDURE — 74011250637 HC RX REV CODE- 250/637: Performed by: OBSTETRICS & GYNECOLOGY

## 2022-01-29 PROCEDURE — G0378 HOSPITAL OBSERVATION PER HR: HCPCS

## 2022-01-29 PROCEDURE — 99231 SBSQ HOSP IP/OBS SF/LOW 25: CPT | Performed by: OBSTETRICS & GYNECOLOGY

## 2022-01-29 PROCEDURE — 99226 PR SBSQ OBSERVATION CARE/DAY 35 MINUTES: CPT | Performed by: OBSTETRICS & GYNECOLOGY

## 2022-01-29 RX ADMIN — INDOMETHACIN 50 MG: 50 CAPSULE ORAL at 00:36

## 2022-01-29 RX ADMIN — MAGNESIUM GLUCONATE 500 MG ORAL TABLET 400 MG: 500 TABLET ORAL at 17:41

## 2022-01-29 RX ADMIN — POLYETHYLENE GLYCOL 3350 17 G: 17 POWDER, FOR SOLUTION ORAL at 00:41

## 2022-01-29 RX ADMIN — FAMOTIDINE 20 MG: 20 TABLET ORAL at 09:16

## 2022-01-29 RX ADMIN — GLYCERIN 1 SUPPOSITORY: 2 SUPPOSITORY RECTAL at 11:41

## 2022-01-29 RX ADMIN — DOCUSATE SODIUM 200 MG: 100 CAPSULE, LIQUID FILLED ORAL at 09:16

## 2022-01-29 RX ADMIN — INDOMETHACIN 50 MG: 50 CAPSULE ORAL at 06:15

## 2022-01-29 RX ADMIN — MAGNESIUM GLUCONATE 500 MG ORAL TABLET 400 MG: 500 TABLET ORAL at 09:16

## 2022-01-29 RX ADMIN — POLYETHYLENE GLYCOL 3350 17 G: 17 POWDER, FOR SOLUTION ORAL at 12:13

## 2022-01-29 RX ADMIN — FAMOTIDINE 20 MG: 20 TABLET ORAL at 00:36

## 2022-01-29 RX ADMIN — INDOMETHACIN 50 MG: 50 CAPSULE ORAL at 12:13

## 2022-01-29 RX ADMIN — FLUTICASONE PROPIONATE 2 SPRAY: 50 SPRAY, METERED NASAL at 09:17

## 2022-01-29 RX ADMIN — DOCUSATE SODIUM 200 MG: 100 CAPSULE, LIQUID FILLED ORAL at 17:25

## 2022-01-29 RX ADMIN — LACTULOSE 15 ML: 20 SOLUTION ORAL at 17:42

## 2022-01-29 RX ADMIN — FAMOTIDINE 20 MG: 20 TABLET ORAL at 21:57

## 2022-01-29 NOTE — PROGRESS NOTES
[facility] Antepartum Progress Note    27 y.o.  at 19w2d by Estimated Date of Delivery: 22. Patient admitted for cervical incompetence. She complains of continued significant constipation- has not had a BM yet. No significant cramping. Open to suppository would prefer to not have enema, but willing to if needed. Patient denies HA, abdominal pain, vision changes. No regular contractions, LOF, VB. Good FM. Minimal edema. No chest pain or shortness of breath. No significant reflux, nausea, constipation, or other GI complaints. ROS per HPI, otherwise unremarkable.       Current Facility-Administered Medications:     polyethylene glycol (MIRALAX) packet 17 g, 17 g, Oral, Q8H, Megan Etienne MD, 17 g at 22 0041    hydrocortisone (ANUSOL-HC) 2.5 % rectal cream, , PeriANAL, QID PRN, Paty Etienne MD    magnesium hydroxide (MILK OF MAGNESIA) 400 mg/5 mL oral suspension 30 mL, 30 mL, Oral, DAILY PRN, Paty Etienne MD, 30 mL at 22 1249    witch hazel-glycerin (TUCKS) 12.5-50 % pads 1 Pad, 1 Pad, PeriANAL, PRN, Paty Etienne MD, 1 Pad at 22 1249    docusate sodium (COLACE) capsule 200 mg, 200 mg, Oral, BID, Paty Etienne MD, 200 mg at 22 0916    NIFEdipine (PROCARDIA) capsule 20 mg, 20 mg, Oral, Q6H PRN, Paty Etienne MD, 20 mg at 22 1048    magnesium oxide (MAG-OX) tablet 400 mg, 400 mg, Oral, BID, Michelle Morales MD, 400 mg at 22 0916    butalbital-acetaminophen-caffeine (FIORICET, ESGIC) -40 mg per tablet 1 Tablet, 1 Tablet, Oral, Q6H PRN, Tiffanie BURCH MD, 1 Tablet at 22 1721    zolpidem (AMBIEN) tablet 5 mg, 5 mg, Oral, QHS PRN, Michelle Morales MD, 5 mg at 22 2257    famotidine (PEPCID) tablet 20 mg, 20 mg, Oral, Q12H, Paty Etienne MD, 20 mg at 22 0916    melatonin tablet 3 mg, 3 mg, Oral, QHS PRN, Paty Etienne MD    sodium chloride (OCEAN) 0.65 % nasal squeeze bottle 2 Spray, 2 Spray, Both Nostrils, Q2H PRN, Noe Etienne MD    fluticasone propionate (FLONASE) 50 mcg/actuation nasal spray 2 Spray, 2 Spray, Both Nostrils, DAILY, Noe Etienne MD, 2 Spray at 22 0917    indomethacin (INDOCIN) capsule 50 mg, 50 mg, Oral, Q6H, Noe Etienne MD, 50 mg at 22 0615    sodium chloride (NS) flush 5-40 mL, 5-40 mL, IntraVENous, Q8H, Yonatan Pride MD, 10 mL at 22 1820    sodium chloride (NS) flush 5-40 mL, 5-40 mL, IntraVENous, PRN, Yonatan Pride MD    acetaminophen (TYLENOL) tablet 650 mg, 650 mg, Oral, Q4H PRN, Yonatan Pride MD, 650 mg at 22 1929    promethazine (PHENERGAN) tablet 25 mg, 25 mg, Oral, Q6H PRN, Yonatan Pride MD    hydrOXYzine HCL (ATARAX) tablet 25 mg, 25 mg, Oral, TID PRN, Yonatan Pride MD    diphenhydrAMINE (BENADRYL) capsule 25 mg, 25 mg, Oral, QHS PRN, Yonatan Pride MD    Vitals:    Patient Vitals for the past 24 hrs:   BP   22 1932 (!) 129/58     Temp (24hrs), Av.4 °F (36.9 °C), Min:98.3 °F (36.8 °C), Max:98.5 °F (36.9 °C)      I&O:  No intake/output data recorded. No intake/output data recorded. Exam:   Constitutional: Patient without distress. HEENT: Symmetric without facial palsy, uncorrected poor hearing or uncorrected poor vision. No thyroid enlargement or goiter  Chest: No use of accessory muscles or excessive work of breathing  Abdomen:Fundus: soft and non tender  Genitourinary: deferred as without complaints of labor or ROM  Cervical Exam:      Membranes: Membrane Status: Intact  Lower Extremities:  Edema: No bilaterally  Skin: normal coloration and turgor, no rashes, no suspicious skin lesions noted. Neurologic: AOx3. Gait normal. Reflexes and motor strength normal and symmetric. Cranial nerves 2-12 and sensation grossly intact.   Psychiatric: non focal    Maternal and Fetal Monitoring:                              Uterine Activity:                     Fetal Heart Rate: Mode: DopplerFetal Heart Rate: 150 Labs:  CBC:    Recent Labs     22  1720 12/10/21  0946 213 21  0616 21  1649 21  1454 21  1344 21  1545   WBC 6.2 4.6 6.9 5.8 8.8 7.0 5.6 4.8   HGB 10.7* 11.6 10.9* 9.2* 10.4* 12.3 11.1* 10.7*   HCT 32.1* 35.3 32.8* 27.1* 30.7* 36.3 33.5* 31.9*    266 244 179 180 209 201 201       CMP:   Recent Labs     12/10/21  0947 21  0616 21  1454 21  1344 21  1545    137 137 137 135* 135* 136   K 4.7 3.8 3.9 4.2 4.0 3.7 4.0    107 110* 107 105 106 107   CO2 22 24 25 24 25 25 23   AGAP  --  6* 2* 6* 5* 4* 6*   GLU 83 82 81 89 66 106* 88   BUN 6 6 10 9 8 7 7   CREA 0.68 0.76 0.57* 0.73 0.63 0.66 0.51*   GFRAA 138 >60 >60 >60 >60 >60 >60   GFRNA 119 >60 >60 >60 >60 >60 >60   CA 9.1 9.2 8.4 8.7 9.3 8.8 8.3   ALB 3.8* 3.5 2.3* 2.8* 2.9* 2.7* 2.6*   TP 6.4 7.4 5.8* 6.9 7.2 6.4 5.8*   GLOB  --  3.9* 3.5 4.1* 4.3* 3.7* 3.2   AGRAT 1.5 0.9* 0.7* 0.7* 0.7* 0.7* 0.8*   ALT 10 20 17 23 26 24 20       Recent Labs     12/10/21  0947 2116 21  1649 21  1545 21  1454 21  1344 21  1545   URICA 2.7 4.0 4.0  --  3.8 3.4 3.2    231* 366*  --  292* 213* 249*   PUQ  --   --   --  370  --   --   --        Recent Glucose Results:   Recent Labs     12/10/21  0947 21  2233 21  0616 21  1649 21  1454 21  1344 21  1545   GLU 83 82 81 89 66 106* 88         A/P: 27 y.o.  at 19w2d    Principal Problem:    Short cervix during pregnancy in second trimester (2022)    Active Problems:    Back pain affecting pregnancy in second trimester (2022)      Short cervix in second trimester, antepartum (2022)      Constipation (2022)    POD3 s/p emergency cerclage in twin pregnancy. Tolerating indocin- last dose at noon  No more periods of contractions and pressure. Will aggressively treat constipation.    Plan dc home tomorrow dependent upon stability. Time:35    Minutes spent on floor,with greater than 50% of the time examining patient, explaining plan and coordinating care with nurse and requesting primary physician.

## 2022-01-29 NOTE — PROGRESS NOTES
Pt states her backs hurts 6/10. I advised pt I needed to place her on the EFM for toco. Pt states she did not want to be placed on the monitor and if pain worsened, she would call out for RN. RN Explained to pt that her pain could be from contractions and pt stated she believed it was from being in bed too long. Risked explained and pt states she understands.

## 2022-01-29 NOTE — PROGRESS NOTES
RN talked to Gladys in pharmacy about contraindication between indocin and fleets enema. Gladys states there is no current labs and would not advise pt to have fleet enema at this time.

## 2022-01-29 NOTE — PROGRESS NOTES
Ante Partum High Risk Pregnancy Note  Dakota Mcarthur  [id number]    Patient admitted for cervical incompetence, cerclage placement. Receives last dose indocin this afternoon. Pt had BM, after glycerin supp, just a few min ago. Denies cramping. Not able to appreciate FM yet. Currently has a HA. Is already on mag oxide 400mg bid. Vitals:  Patient Vitals for the past 24 hrs:   BP Temp Pulse Resp   22 0915  98.5 °F (36.9 °C)  16   22 1932 (!) 129/58 98.3 °F (36.8 °C) 81 18     Temp (24hrs), Av.4 °F (36.9 °C), Min:98.3 °F (36.8 °C), Max:98.5 °F (36.9 °C)    I&O:  No intake/output data recorded. No intake/output data recorded. Exam:  Patient without distress. Up walking. Showered this am.               Abdomen soft, non-tender               Fundus soft and non tender                          Labs: No results found for this or any previous visit (from the past 24 hour(s)). Assessment:    Patient Active Problem List    Diagnosis Date Noted    Constipation 2022    Back pain affecting pregnancy in second trimester 2022    Short cervix during pregnancy in second trimester 2022    Short cervix in second trimester, antepartum 2022    Habitual aborter, currently pregnant 2022    Low weight gain during pregnancy in second trimester 2022    Multigravida in first trimester 2021    Dichorionic diamniotic twin pregnancy in second trimester 2021    History of pre-eclampsia in prior pregnancy, currently pregnant in first trimester 2021    Short interval between pregnancies affecting pregnancy, antepartum 2021    Herpes simplex virus type 2 (HSV-2) infection affecting pregnancy in first trimester 2021    History of postpartum depression 06/15/2021       Plan: Will add lactulose to her bowel regimen.  D/w pt that if helpful, she can continue it at home with her other meds.  Encouraged her to ask for tylenol as needed for HA.

## 2022-01-29 NOTE — PROGRESS NOTES
Dr Neena Martin notified that pt is c/o constipation pain. Pt states she is passing gas, but does not know when she had a BM last. Pt had cerclage paced 1/26. Pt has had MOM, Miralax, Colace and still not able to have BM. Pt states she would like to try suppository and then enema if it did not work. MD confirmed order for suppository and then enema PRN.  MD states it will be safe

## 2022-01-30 VITALS
WEIGHT: 222 LBS | HEART RATE: 83 BPM | DIASTOLIC BLOOD PRESSURE: 83 MMHG | TEMPERATURE: 98.6 F | BODY MASS INDEX: 34.84 KG/M2 | OXYGEN SATURATION: 98 % | RESPIRATION RATE: 16 BRPM | HEIGHT: 67 IN | SYSTOLIC BLOOD PRESSURE: 128 MMHG

## 2022-01-30 PROCEDURE — 99238 HOSP IP/OBS DSCHRG MGMT 30/<: CPT | Performed by: OBSTETRICS & GYNECOLOGY

## 2022-01-30 PROCEDURE — G0378 HOSPITAL OBSERVATION PER HR: HCPCS

## 2022-01-30 RX ORDER — LANOLIN ALCOHOL/MO/W.PET/CERES
400 CREAM (GRAM) TOPICAL 2 TIMES DAILY
Qty: 60 TABLET | Refills: 12 | Status: SHIPPED | OUTPATIENT
Start: 2022-01-30

## 2022-01-30 RX ORDER — POLYETHYLENE GLYCOL 3350 17 G/17G
17 POWDER, FOR SOLUTION ORAL EVERY 8 HOURS
Qty: 30 PACKET | Refills: 12 | Status: SHIPPED | OUTPATIENT
Start: 2022-01-30

## 2022-01-30 RX ORDER — FAMOTIDINE 20 MG/1
20 TABLET, FILM COATED ORAL EVERY 12 HOURS
Qty: 60 TABLET | Refills: 12 | Status: SHIPPED | OUTPATIENT
Start: 2022-01-30

## 2022-01-30 RX ORDER — DOCUSATE SODIUM 100 MG/1
200 CAPSULE, LIQUID FILLED ORAL 2 TIMES DAILY
Qty: 120 CAPSULE | Refills: 2 | Status: SHIPPED | OUTPATIENT
Start: 2022-01-30 | End: 2022-04-11

## 2022-01-30 RX ORDER — FLUTICASONE PROPIONATE 50 MCG
SPRAY, SUSPENSION (ML) NASAL
Qty: 1 EACH | Refills: 5 | Status: SHIPPED | OUTPATIENT
Start: 2022-01-30 | End: 2022-02-10

## 2022-01-30 RX ORDER — NIFEDIPINE 20 MG/1
20 CAPSULE ORAL
Qty: 120 CAPSULE | Refills: 5 | Status: SHIPPED | OUTPATIENT
Start: 2022-01-30

## 2022-01-30 NOTE — PROGRESS NOTES
Ante Partum High Risk Pregnancy Note  Kurt Meelndez  [id number]    Patient admitted for cervical insufficiency. Had a quiet night. No problems with pain or cramping or vaginal d/c. Pt had good results with lactulose yday. Vitals:  Patient Vitals for the past 24 hrs:   BP Temp Pulse Resp   22 0828 128/83 98.6 °F (37 °C) 83    22 2159 135/69 97.8 °F (36.6 °C) 71 16     Temp (24hrs), Av.2 °F (36.8 °C), Min:97.8 °F (36.6 °C), Max:98.6 °F (37 °C)    I&O:  No intake/output data recorded. No intake/output data recorded. Exam:  Patient without distress. Abdomen soft, non-tender               Fundus soft and non tender                          Labs: No results found for this or any previous visit (from the past 24 hour(s)). Assessment:    Patient Active Problem List    Diagnosis Date Noted    Constipation 2022    Back pain affecting pregnancy in second trimester 2022    Short cervix during pregnancy in second trimester 2022    Short cervix in second trimester, antepartum 2022    Habitual aborter, currently pregnant 2022    Low weight gain during pregnancy in second trimester 2022    Multigravida in first trimester 2021    Dichorionic diamniotic twin pregnancy in second trimester 2021    History of pre-eclampsia in prior pregnancy, currently pregnant in first trimester 2021    Short interval between pregnancies affecting pregnancy, antepartum 2021    Herpes simplex virus type 2 (HSV-2) infection affecting pregnancy in first trimester 2021    History of postpartum depression 06/15/2021       Plan:  Ready to go home. 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 Keep lifting to 10-12# or less. She had appt with SEBASTIAN hawkins.

## 2022-01-30 NOTE — DISCHARGE INSTRUCTIONS
Patient Education        Cervical Cerclage to Prevent  Delivery: What to Expect at Home  Your Recovery  Cervical cerclage (say \"SER-vuh-kul ser-KLAZH\") is a procedure that helps keep your cervix from opening too soon. Your doctor has sewn your cervix shut to help prevent  labor. For the next few days, you may have:  · Cramping. · Spotting. · Pain when you urinate. Your doctor may give you instructions on when you can do your normal activities again, such as driving and going back to work. Your doctor will remove the stitches around your cervix at 37 weeks, or if you go into  labor or show signs of infection. This care sheet gives you a general idea about how long it will take for you to recover. But each person recovers at a different pace. Follow the steps below to get better as quickly as possible. How can you care for yourself at home? Activity    · Rest when you feel tired.     · Ask your doctor when it is okay for you to have sex. Medicines    · Be safe with medicines. Read and follow all instructions on the label.     · If you are not taking a prescription pain medicine, ask your doctor if you can take an over-the-counter medicine.     · Your doctor will tell you if and when you can restart your medicines. He or she will also give you instructions about taking any new medicines. Follow-up care is a key part of your treatment and safety. Be sure to make and go to all appointments, and call your doctor if you are having problems. It's also a good idea to know your test results and keep a list of the medicines you take. When should you call for help? Call 911 anytime you think you may need emergency care. For example, call if:    · You have severe trouble breathing.     · You have sudden, severe pain in your belly.     · You have severe vaginal bleeding.    Call your doctor now or seek immediate medical care if:    · You have new pelvic pain, or the pain in your pelvis gets worse.     · You have a new discharge from your vagina.     · You have a new or higher fever. Watch closely for changes in your health, and be sure to contact your doctor if you have any problems. Where can you learn more? Go to http://www.gray.com/  Enter Y759 in the search box to learn more about \"Cervical Cerclage to Prevent  Delivery: What to Expect at Home. \"  Current as of: 2021               Content Version: 13.0  © 9944-7168 [employer], [employer]. Care instructions adapted under license by [employer] (which disclaims liability or warranty for this information). If you have questions about a medical condition or this instruction, always ask your healthcare professional. [address] any warranty or liability for your use of this information.

## 2022-01-30 NOTE — DISCHARGE SUMMARY
Antepartum Discharge Summary     Name: Yonatan Roth MRN: [id number]  SSN: [id number]    YOB: 1995  Age: 27 y.o. Sex: female      Allergies: Bactrim [sulfamethoprim ds]    Admit Date: 1/25/2022    Discharge Date: 1/30/2022     Admitting Physician: Demetra Chaudhari MD     Attending Physician:  Aj Alonzo DO     * Admission Diagnoses: Short cervix in second trimester, antepartum [O26.872]    * Discharge Diagnoses:   Hospital Problems as of 1/30/2022 Date Reviewed: 1/25/2022          Codes Class Noted - Resolved POA    Constipation ICD-10-CM: K59.00  ICD-9-CM: 564.00  1/27/2022 - Present Unknown        Back pain affecting pregnancy in second trimester ICD-10-CM: O99.891, M54.9  ICD-9-CM: 646.83, 724.5  1/25/2022 - Present Unknown        * (Principal) Short cervix during pregnancy in second trimester ICD-10-CM: O26.872  ICD-9-CM: 649.73  1/25/2022 - Present Unknown        Short cervix in second trimester, antepartum ICD-10-CM: O26.872  ICD-9-CM: 649.73  1/25/2022 - Present Unknown             Lab Results   Component Value Date/Time    ABO/Rh(D) A POSITIVE 01/25/2022 05:20 PM    Rubella, External 20.80 02/27/2017 12:00 AM    ABO,Rh A+ Positive 02/27/2017 12:00 AM      Immunization History   Administered Date(s) Administered    DTaP 02/02/1994, 03/31/1994, 06/03/1994, 02/23/1995, 12/16/1997    Hib 02/02/1994, 03/31/1994, 06/03/1994, 02/23/1995    Influenza Vaccine 09/27/2010    MMR 02/23/1995, 12/16/1997    Meningococcal B (OMV) Vaccine 09/09/2009    OPV 02/02/1994, 03/31/1994, 06/03/1994, 12/16/1997    Tdap 09/09/2009, 07/17/2017    Varicella Virus Vaccine 12/16/1997, 09/09/2009       * Discharge Condition: stable    * Procedures:   Procedure(s):  CERCLAGE  toco monitoring    * Hospital Course:    - Cervical Incompetence:                           - Patient received cerclage.     * Disposition: Home    Discharge Medications:   Current Discharge Medication List      START taking these medications    Details   docusate sodium (COLACE) 100 mg capsule Take 2 Capsules by mouth two (2) times a day for 90 days. Qty: 120 Capsule, Refills: 2  Start date: 1/30/2022, End date: 4/30/2022      famotidine (PEPCID) 20 mg tablet Take 1 Tablet by mouth every twelve (12) hours. Qty: 60 Tablet, Refills: 12  Start date: 1/30/2022      fluticasone propionate (FLONASE) 50 mcg/actuation nasal spray One spray each side daily prn  Indications: inflammation of the nose due to an allergy  Qty: 1 Each, Refills: 5  Start date: 1/30/2022      lactulose (CHRONULAC) 10 gram/15 mL solution Take 15 mL by mouth two (2) times a day. Qty: 900 mL, Refills: 5  Start date: 1/30/2022      magnesium oxide (MAG-OX) 400 mg tablet Take 1 Tablet by mouth two (2) times a day. Qty: 60 Tablet, Refills: 12  Start date: 1/30/2022      NIFEdipine (PROCARDIA) 20 mg capsule Take 1 Capsule by mouth every six (6) hours as needed (contractions). Indications: early labor  Qty: 120 Capsule, Refills: 5  Start date: 1/30/2022      polyethylene glycol (MIRALAX) 17 gram packet Take 1 Packet by mouth every eight (8) hours. Indications: constipation  Qty: 30 Packet, Refills: 12  Start date: 1/30/2022         CONTINUE these medications which have NOT CHANGED    Details   cholecalciferol (VITAMIN D3) (2,000 UNITS /50 MCG) cap capsule Take 1 Capsule by mouth daily. Qty: 90 Capsule, Refills: 5    Associated Diagnoses: Dichorionic diamniotic twin pregnancy in second trimester      calcium carbonate (Mason-Gest Antacid) 200 mg calcium (500 mg) chew Take 2 Tablets by mouth two (2) times a day. Indications: prevention of a low amount of calcium in the blood  Qty: 200 Tablet, Refills: 5    Associated Diagnoses: Dichorionic diamniotic twin pregnancy in second trimester      food supplemt, lactose-reduced (Ensure) liqd Take 237 mL by mouth four (4) times daily.   Qty: 100 Each, Refills: 6      aspirin delayed-release 81 mg tablet TAKE 2 TABLETS BY MOUTH EVERY DAY  Qty: 60 Tablet, Refills: 5      Prenatal Vitamin Plus Low Iron 27 mg iron- 1 mg tab TAKE 1 TABLET BY MOUTH EVERY DAY  Qty: 90 Tablet, Refills: 3      sodium chloride (Saline NasaL) 0.65 % nasal squeeze bottle 0.05 mL by Both Nostrils route as needed for Congestion. Indications: stuffy nose  Qty: 60 mL, Refills: 5    Associated Diagnoses: Multigravida in first trimester; Dichorionic diamniotic twin pregnancy in second trimester      valACYclovir (VALTREX) 500 mg tablet Take 1 Tab by mouth two (2) times a day. Qty: 60 Tab, Refills: 11             * Follow-up Care/Patient Instructions:   Activity: pelvic rest. No heavy lifting (over 10-12#)  Diet: Resume previous diet  Wound Care: None needed    Follow-up Information     Follow up With Specialties Details Why Contact iX Robert NP Nurse Practitioner   [address] [address]  [phone]

## 2022-01-31 PROBLEM — M54.9 BACK PAIN AFFECTING PREGNANCY IN SECOND TRIMESTER: Status: RESOLVED | Noted: 2022-01-25 | Resolved: 2022-01-31

## 2022-01-31 PROBLEM — Z34.82 MULTIGRAVIDA IN SECOND TRIMESTER: Status: ACTIVE | Noted: 2021-11-12

## 2022-01-31 PROBLEM — O99.612 CONSTIPATION DURING PREGNANCY IN SECOND TRIMESTER: Status: ACTIVE | Noted: 2022-01-27

## 2022-01-31 PROBLEM — O10.919 CHRONIC HYPERTENSION AFFECTING PREGNANCY: Status: ACTIVE | Noted: 2022-01-31

## 2022-01-31 PROBLEM — O09.292 HISTORY OF PRE-ECLAMPSIA IN PRIOR PREGNANCY, CURRENTLY PREGNANT IN SECOND TRIMESTER: Status: ACTIVE | Noted: 2021-11-12

## 2022-01-31 PROBLEM — O99.891 BACK PAIN AFFECTING PREGNANCY IN SECOND TRIMESTER: Status: RESOLVED | Noted: 2022-01-25 | Resolved: 2022-01-31

## 2022-01-31 PROBLEM — O34.32 CERVICAL CERCLAGE SUTURE PRESENT IN SECOND TRIMESTER: Status: ACTIVE | Noted: 2022-01-31

## 2022-01-31 PROBLEM — O98.512 HERPES SIMPLEX VIRUS TYPE 2 (HSV-2) INFECTION AFFECTING PREGNANCY IN SECOND TRIMESTER: Status: ACTIVE | Noted: 2021-11-12

## 2022-02-03 ENCOUNTER — TELEPHONE (OUTPATIENT)
Dept: CASE MANAGEMENT | Age: 27
End: 2022-02-03

## 2022-02-03 NOTE — TELEPHONE ENCOUNTER
Phone call to patient at [phone] to check-in (SW met with patient last week when she was admitted). Per patient, \"Everything is still good for the most part, but I'm still not getting any rest.\"  Patient shared that she's hoping to find housing so that she and her children are no longer residing with family. Additionally, patient confirms still having the counseling resources I provided last week. Patient plans to call some of these counselors next week to discuss availability. Patient denied any needs from  at this time. She states that she will save my phone number for any future needs/questions.         ENID Walker, [facility]   [phone]

## 2022-02-09 ENCOUNTER — HOSPITAL ENCOUNTER (OUTPATIENT)
Age: 27
Discharge: HOME | End: 2022-02-09
Attending: OBSTETRICS & GYNECOLOGY | Admitting: OBSTETRICS & GYNECOLOGY
Payer: COMMERCIAL

## 2022-02-09 VITALS
DIASTOLIC BLOOD PRESSURE: 76 MMHG | BODY MASS INDEX: 34.1 KG/M2 | HEIGHT: 68 IN | HEART RATE: 92 BPM | WEIGHT: 225 LBS | SYSTOLIC BLOOD PRESSURE: 123 MMHG | RESPIRATION RATE: 17 BRPM | TEMPERATURE: 98.1 F | OXYGEN SATURATION: 99 %

## 2022-02-09 PROBLEM — O46.92 VAGINAL BLEEDING IN PREGNANCY, SECOND TRIMESTER: Status: ACTIVE | Noted: 2022-02-09

## 2022-02-09 PROCEDURE — 99282 EMERGENCY DEPT VISIT SF MDM: CPT

## 2022-02-09 PROCEDURE — 76815 OB US LIMITED FETUS(S): CPT

## 2022-02-09 NOTE — PROGRESS NOTES
Discharge orders received from MD. Discharge instructions reviewed with pt and pt verbalized understanding.

## 2022-02-09 NOTE — PROGRESS NOTES
Pt presents to [facility] with c/o spotting. Pt has cerclage that was placed 2 weeks ago. She noticed bright red spotting 2 days ago. Last night around 2300 she noticed some mucousy brown discharge that continued until this morning.

## 2022-02-09 NOTE — H&P
Chief Complaint   Patient presents with   Dalia Moras     started 2 days ago       27 y.o. female [id number] at 20w6d  weeks gestation who requests evaluation for vaginal spotting. Cerclage placed 2 weeks ago, had intercourse on the 3rd, noted brown d/c last night through this am. No contractions or lof. Her pregnancy issues include: recurrent pregnancy loss, di di twins this pregnancy, cerclage, chronic htn.        HISTORY:  OB History    Para Term  AB Living   9 4 1 3 4 3   SAB IAB Ectopic Molar Multiple Live Births   4 0 0 0 0 3      # Outcome Date GA Lbr Timoteo/2nd Weight Sex Delivery Anes PTL Lv   9 Current            8  21 34w6d 02:04 2.055 kg F Vag-Spont EPI Y ZAYRA      Complications: Fetal Intolerance   7 SAB 20 12w0d             Birth Comments: Trisomy 18   6  19 30w0d   F Vag-Spont  N ZAYRA      Complications: Pre-eclampsia   5 2018 8w0d             Birth Comments: D&C required   4 2016 8w0d             Birth Comments: D&C required   3 SAB 2015 8w0d             Birth Comments: D&C required   2  14 19w0d  0.05 kg U VAGINAL DELI None N FD      Birth Comments: [facility] early in pregnancy      Complications: Abruptio Placenta   1 Term 09/09/10 39w0d  2.92 kg M Vag-Spont EPIDURAL AN N ZAYRA      Birth Comments: PreE       Past Surgical History:   Procedure Laterality Date    HX GYN      HX OTHER SURGICAL      D&C     HX OTHER SURGICAL      D&C     HX OTHER SURGICAL  2022    cerclage       Past Medical History:   Diagnosis Date    Abnormal genetic test during pregnancy 10/26/2020    G6 - NIPT elevated risk for trisomy 18, missed AB with suction D&C    Anemia     Genital herpes simplex 3/22/2018    Hypertension     gestational BP    Missed ab 2/10/15    Postpartum depression 6/15/2021    Preeclampsia      delivery     Subchorionic bleed 2014    Vulvovaginal rash 3/9/2021       Allergies   Allergen Reactions    Bactrim [Sulfamethoprim Ds] Nausea and Vomiting       Family History   Problem Relation Age of Onset    Asthma Father     Diabetes Father     Bleeding Prob Brother         Brain    Diabetes Brother     Other Brother         brain tumor    Other Paternal Grandmother         Lung cancer    Other Mother         cervical cancer    Other Maternal Grandmother         cervical cancer    Breast Cancer Neg Hx     Uterine Cancer Neg Hx     Ovarian Cancer Neg Hx     Collagen Dis Neg Hx     Colon Cancer Neg Hx        Social History     Socioeconomic History    Marital status: SINGLE     Spouse name: Not on file    Number of children: Not on file    Years of education: Not on file    Highest education level: Not on file   Occupational History    Not on file   Tobacco Use    Smoking status: Former Smoker     Packs/day: 0.25     Years: 0.50     Pack years: 0.12     Quit date: 2020     Years since quittin.3    Smokeless tobacco: Never Used   Vaping Use    Vaping Use: Never used   Substance and Sexual Activity    Alcohol use: No    Drug use: No    Sexual activity: Yes     Partners: Male   Other Topics Concern     Service Not Asked    Blood Transfusions Not Asked    Caffeine Concern No    Occupational Exposure Not Asked    Hobby Hazards Not Asked    Sleep Concern Not Asked    Stress Concern Not Asked    Weight Concern Not Asked    Special Diet Not Asked    Back Care Not Asked    Exercise No    Bike Helmet Not Asked    Seat Belt Yes    Self-Exams Yes   Social History Narrative    Abuse: Feels safe at home, no history of physical abuse, no history of sexual abuse     Social Determinants of Health     Financial Resource Strain:     Difficulty of Paying Living Expenses: Not on file   Food Insecurity:     Worried About Running Out of Food in the Last Year: Not on file    Efrain of Food in the Last Year: Not on file   Transportation Needs:     Lack of Transportation (Medical):  Not on file  Lack of Transportation (Non-Medical): Not on file   Physical Activity:     Days of Exercise per Week: Not on file    Minutes of Exercise per Session: Not on file   Stress:     Feeling of Stress : Not on file   Social Connections:     Frequency of Communication with Friends and Family: Not on file    Frequency of Social Gatherings with Friends and Family: Not on file    Attends Sikh Services: Not on file    Active Member of [address] [place] [address] or Organizations: Not on file    Attends Club or Organization Meetings: Not on file    Marital Status: Not on file   Intimate Partner Violence:     Fear of Current or Ex-Partner: Not on file    Emotionally Abused: Not on file    Physically Abused: Not on file    Sexually Abused: Not on file   Housing Stability:     Unable to Pay for Housing in the Last Year: Not on file    Number of [place] in the Last Year: Not on file    Unstable Housing in the Last Year: Not on file       ROS:  Negative:   negative 10 point ROS except as noted in HPI    Positive:   per hpi    PHYSICAL EXAM:  Blood pressure 123/76, pulse 92, temperature 98.1 °F (36.7 °C), resp. rate 17, height 5' 7.5\" (1.715 m), weight 102.1 kg (225 lb), SpO2 99 %, not currently breastfeeding. The patient appears well, alert, oriented x 3. Appropriate affect. Lungs are clear. Heart RRR, no murmurs. Abdomen soft, non-tender, no rebound/guarding, normoactive bs. Fundus soft and non tender  Skin warm, dry, no rashes  Ext no edema, DTR's normal  SSE: small amount of old blood noted, no active bleeding. Cervix: closed, thick, no tension on cervix. Fetal Heart Rate: us x2    TVUS: cervical length >3 on 3 measurements. OBUS di di twin gestation with anterior fundal placenta. Tr/vtx. fca x2, good movement and subjectively normal fluid. i performed and read the us. pictures scanned into record. no previa noted.        I have personally reviewed the patient's history, prenatal record, and pertinent test results. vital sign trends, radiology reports, laboratory results, previous provider notes support my clinical impression. Assessment:  27 y.o. female at 23w11d  s/p cerclage with old vaginal bleeding, possibly related to intercourse. normal cervix length  Reassuring fetal status    Plan:  Findings and test results were discussed. Pelvic rest precautions emphasized. Patient reassured. Keep fu appt.   Time spent with patient consistent with level of care    Signed By:  Mariela Johnson MD     February 9, 2022

## 2022-02-09 NOTE — PROGRESS NOTES
MD at bedside. Speculum exam performed. SVE performed. Vaginal ultrasound performed. Abdominal ultrasound performed to check heart tones.

## 2022-02-09 NOTE — DISCHARGE INSTRUCTIONS
Patient Education        Vaginal Bleeding During Pregnancy: Care Instructions  Overview     It's common to have some vaginal bleeding when you are pregnant. In some cases, the bleeding isn't serious. And there aren't any more problems with the pregnancy. But sometimes bleeding is a sign of a more serious problem. This is more common if the bleeding is heavy or painful. Examples of more serious problems include miscarriage, an ectopic pregnancy, and a problem with the placenta. You may have to see your doctor again to be sure everything is okay. You may also need more tests to find the cause of the bleeding. Home treatment may be all you need. But it depends on what is causing the bleeding. Be sure to tell your doctor if you have any new symptoms or if your symptoms get worse. The doctor has checked you carefully, but problems can develop later. If you notice any problems or new symptoms, get medical treatment right away. Follow-up care is a key part of your treatment and safety. Be sure to make and go to all appointments, and call your doctor if you are having problems. It's also a good idea to know your test results and keep a list of the medicines you take. How can you care for yourself at home? · If your doctor prescribed medicines, take them exactly as directed. Call your doctor if you think you are having a problem with your medicine. · Do not have sex until your doctor says it's okay. · Do not put anything in your vagina until your doctor says it's okay. · Ask your doctor about other activities you can or can't do. · Get a lot of rest. Being pregnant can make you tired. · Eat a healthy diet. Include a lot of peas, beans, and leafy green vegetables. Talk to a dietitian if you need help planning your diet. · Do not use nonsteroidal anti-inflammatory drugs (NSAIDs), such as ibuprofen (Advil, Motrin), naproxen (Aleve), or aspirin, unless your doctor says it is okay. When should you call for help? Call 911 anytime you think you may need emergency care. For example, call if:    · You passed out (lost consciousness).     · You have severe vaginal bleeding. Call your doctor now or seek immediate medical care if:    · You have any vaginal bleeding.     · You have pain in your belly or pelvis.     · You think that you are in labor.     · You have a sudden release of fluid from your vagina.     · You notice that your baby has stopped moving or is moving much less than normal.   Watch closely for changes in your health, and be sure to contact your doctor if you have any questions or concerns. Where can you learn more? Go to http://www.gray.com/  Enter U249 in the search box to learn more about \"Vaginal Bleeding During Pregnancy: Care Instructions. \"  Current as of: June 16, 2021               Content Version: 13.0  © 0401-1591 [employer]. Care instructions adapted under license by [employer] (which disclaims liability or warranty for this information). If you have questions about a medical condition or this instruction, always ask your healthcare professional. [address] any warranty or liability for your use of this information.

## 2022-02-10 PROBLEM — N89.8 VAGINAL DISCHARGE DURING PREGNANCY IN SECOND TRIMESTER: Status: ACTIVE | Noted: 2022-02-10

## 2022-02-10 PROBLEM — O26.892 VAGINAL DISCHARGE DURING PREGNANCY IN SECOND TRIMESTER: Status: ACTIVE | Noted: 2022-02-10

## 2022-02-25 PROBLEM — O46.92 VAGINAL BLEEDING IN PREGNANCY, SECOND TRIMESTER: Status: RESOLVED | Noted: 2022-02-09 | Resolved: 2022-02-25

## 2022-02-25 PROBLEM — O26.892 VAGINAL DISCHARGE DURING PREGNANCY IN SECOND TRIMESTER: Status: RESOLVED | Noted: 2022-02-10 | Resolved: 2022-02-25

## 2022-02-25 PROBLEM — N89.8 VAGINAL DISCHARGE DURING PREGNANCY IN SECOND TRIMESTER: Status: RESOLVED | Noted: 2022-02-10 | Resolved: 2022-02-25

## 2022-03-04 PROBLEM — O10.912 PRE-EXISTING HYPERTENSION AFFECTING PREGNANCY IN SECOND TRIMESTER: Status: ACTIVE | Noted: 2022-01-31

## 2022-03-06 ENCOUNTER — HOSPITAL ENCOUNTER (EMERGENCY)
Age: 27
Discharge: HOME | End: 2022-03-06
Attending: OBSTETRICS & GYNECOLOGY | Admitting: OBSTETRICS & GYNECOLOGY
Payer: COMMERCIAL

## 2022-03-06 VITALS
SYSTOLIC BLOOD PRESSURE: 131 MMHG | DIASTOLIC BLOOD PRESSURE: 73 MMHG | RESPIRATION RATE: 18 BRPM | HEART RATE: 96 BPM | TEMPERATURE: 98.4 F

## 2022-03-06 PROBLEM — R10.2 PELVIC PAIN AFFECTING PREGNANCY IN SECOND TRIMESTER, ANTEPARTUM: Status: ACTIVE | Noted: 2022-03-06

## 2022-03-06 PROBLEM — O26.892 PELVIC PAIN AFFECTING PREGNANCY IN SECOND TRIMESTER, ANTEPARTUM: Status: ACTIVE | Noted: 2022-03-06

## 2022-03-06 LAB
GLUCOSE, GLUUPC: NEGATIVE
KETONES UR-MCNC: NEGATIVE MG/DL
PROT UR QL: NEGATIVE

## 2022-03-06 PROCEDURE — 81002 URINALYSIS NONAUTO W/O SCOPE: CPT | Performed by: OBSTETRICS & GYNECOLOGY

## 2022-03-06 PROCEDURE — 76817 TRANSVAGINAL US OBSTETRIC: CPT

## 2022-03-06 PROCEDURE — 59025 FETAL NON-STRESS TEST: CPT

## 2022-03-06 PROCEDURE — 99284 EMERGENCY DEPT VISIT MOD MDM: CPT

## 2022-03-06 NOTE — PROGRESS NOTES
27year old  at 24.3 weeks twin gestation presents to [place] via ambulatory with complaints of abdominal pain and vaginal pressure. Pt denies vaginal bleeding or leakage of fluid. EFHT of baby B in 150s, no contractions noted on toco or palpation. Ultrasound monitor not working properly for second ultrasound therefore baby A heart rate found via doppler to be in the 140s.  Dr. Kamila Hussein notified of pt arrival.

## 2022-03-06 NOTE — H&P
[facility] History & Physical    Name: Yonatan Roth MRN: [id number]  SSN: [id number]    YOB: 1995  Age: 27 y.o. Sex: female      Subjective:     Reason for Admission:  Pregnancy and pelvic pressure    History of Present Illness: Ms. Cody Coleman is a 27 y.o.  female with an estimated gestational age of 18w2d with Estimated Date of Delivery: 22. Patient complains of sharp shooting pain in pelvis and vagina for 1 days. 6/10 on pain scale, comes and goes, better with lying down and rest.  Has been active taking care of her children. Pregnancy has been complicated by recurrent pregnancy loss, di di twins this pregnancy, cerclage, chronic HTN. Patient denies abdominal pain  , contractions, fever, headache , nausea and vomiting, right upper quadrant pain  , shortness of breath, swelling, vaginal bleeding , vaginal leaking of fluid  and visual disturbances. Cerclage was placed 22 with Dr. Eve Mittal. At last appt she saw Dr. Gallegos Day 3/4/22 and was without complaint at that time. She had negative genital culture and wet mount 22 after presented to [facility] with some spotting. She has not had intercourse. She denies any change in vaginal discharge or other concerns.     OB History    Para Term  AB Living   9 4 1 3 4 3   SAB IAB Ectopic Molar Multiple Live Births   4 0 0 0 0 3      # Outcome Date GA Lbr Timoteo/2nd Weight Sex Delivery Anes PTL Lv   9 Current            8  21 34w6d 02:04 2.055 kg F Vag-Spont EPI Y ZAYRA      Complications: Fetal Intolerance   7 SAB 20 12w0d             Birth Comments: Trisomy 18   6  19 30w0d   F Vag-Spont  N ZAYRA      Complications: Pre-eclampsia   5 SAB  8w0d             Birth Comments: D&C required   4 SAB  8w0d             Birth Comments: D&C required   3 SAB 2015 8w0d             Birth Comments: D&C required   2  14 19w0d  0.05 kg U VAGINAL DELI None N FD      Birth Comments: [facility] early in pregnancy      Complications: Abruptio Placenta   1 Term 09/09/10 39w0d  2.92 kg M Vag-Spont EPIDURAL AN N ZAYRA      Birth Comments: PreE     Past Medical History:   Diagnosis Date    Abnormal genetic test during pregnancy 10/26/2020    G6 - NIPT elevated risk for trisomy 18, missed AB with suction D&C    Anemia     Genital herpes simplex 3/22/2018    Hypertension     gestational BP    Missed ab 2/10/15    Postpartum depression 6/15/2021    Preeclampsia      delivery     Subchorionic bleed 2014    Vulvovaginal rash 3/9/2021     Past Surgical History:   Procedure Laterality Date    HX GYN      HX OTHER SURGICAL      D&C     HX OTHER SURGICAL      D&C     HX OTHER SURGICAL  2022    cerclage     Social History     Occupational History    Not on file   Tobacco Use    Smoking status: Former Smoker     Packs/day: 0.25     Years: 0.50     Pack years: 0.12     Quit date: 2020     Years since quittin.4    Smokeless tobacco: Never Used   Vaping Use    Vaping Use: Never used   Substance and Sexual Activity    Alcohol use: No    Drug use: No    Sexual activity: Yes     Partners: Male      Family History   Problem Relation Age of Onset    Asthma Father     Diabetes Father     Bleeding Prob Brother         Brain    Diabetes Brother     Other Brother         brain tumor    Other Paternal Grandmother         Lung cancer    Other Mother         cervical cancer    Other Maternal Grandmother         cervical cancer    Breast Cancer Neg Hx     Uterine Cancer Neg Hx     Ovarian Cancer Neg Hx     Collagen Dis Neg Hx     Colon Cancer Neg Hx        Allergies   Allergen Reactions    Bactrim [Sulfamethoprim Ds] Nausea and Vomiting     Prior to Admission medications    Medication Sig Start Date End Date Taking? Authorizing Provider   docusate sodium (COLACE) 100 mg capsule Take 2 Capsules by mouth two (2) times a day for 90 days.  22 Yes Forrest Franklin MD famotidine (PEPCID) 20 mg tablet Take 1 Tablet by mouth every twelve (12) hours. 22  Yes Alison Brown MD   magnesium oxide (MAG-OX) 400 mg tablet Take 1 Tablet by mouth two (2) times a day. 22  Yes Phyllis Cai MD   NIFEdipine (PROCARDIA) 20 mg capsule Take 1 Capsule by mouth every six (6) hours as needed (contractions). Indications: early labor 22  Yes Alison Brown MD   polyethylene glycol (MIRALAX) 17 gram packet Take 1 Packet by mouth every eight (8) hours. Indications: constipation 22  Yes Alison Brown MD   cholecalciferol (VITAMIN D3) (2,000 UNITS /50 MCG) cap capsule Take 1 Capsule by mouth daily. 22  Yes Laurent Eldridge MD   calcium carbonate (Mason-Gest Antacid) 200 mg calcium (500 mg) chew Take 2 Tablets by mouth two (2) times a day. Indications: prevention of a low amount of calcium in the blood 22  Yes Laurent Eldridge MD   aspirin delayed-release 81 mg tablet TAKE 2 TABLETS BY MOUTH EVERY DAY 21  Yes Krystyna Thibodeaux NP   Prenatal Vitamin Plus Low Iron 27 mg iron- 1 mg tab TAKE 1 TABLET BY MOUTH EVERY DAY 21  Yes Krystyna Thibodeaux NP   lactulose (CHRONULAC) 10 gram/15 mL solution Take 15 mL by mouth two (2) times a day. Patient not taking: Reported on 2022   Alison Brown MD   food supplemt, lactose-reduced (Ensure) liqd Take 237 mL by mouth four (4) times daily. 22   Krystyna Thibodeaux NP   valACYclovir (VALTREX) 500 mg tablet Take 1 Tab by mouth two (2) times a day. Patient not taking: Reported on 3/6/2022 12/14/20   Krystyna Thibodeaux NP        Review of Systems:  A comprehensive review of systems was negative except for that written in the History of Present Illness.      Objective:     Vitals:    Vitals:    22 1758   BP: 131/73   Pulse: 96   Resp: 18   Temp: 98.4 °F (36.9 °C)      Temp (24hrs), Av.4 °F (36.9 °C), Min:98.4 °F (36.9 °C), Max:98.4 °F (36.9 °C)    BP  Min: 131/73  Max: 131/73     Physical Exam:  Constitutional: The patient appears well, alert, oriented x 3. Cardiovascular: Heart RRR, no murmurs. Respiratory: Lungs clear, no respiratory distress  GI: Abdomen soft, nontender, no guarding  No fundal tenderness  Musculoskeletal: no cva tenderness  Upper ext: no edema, reflexes +2  Lower ext: no edema, neg karthik's, reflexes +2  Skin: no rashes or lesions  Psychiatric:Mood/ Affect: appropriate  Genitourinary: SSE: cervix visually closed, no bleeding, no lesions  TVUS performed  Sonolucent area lower cervix c/w stitch, no funneling, no change with pressure  Length 2.9 cm by TVUS  Bedside abdominal ultrasound- Fetus A variable lie/breech/transverse  Fetus B vertex good movement seen x 2  Membranes:  Intact  Uterine Activity:  None  Fetal Heart Rate:    Fetus A 155 bpm, accels present, decels absent  Fetus B 150 bpm  Lab/Data Review:  No results found for this or any previous visit (from the past 24 hour(s)). Assessment and Plan:     Principal Problem:    Pelvic pain affecting pregnancy in second trimester, antepartum (3/6/2022)    Cerclage in place  Short interval pregnancies  CHTN  Not in labor. Exam without acute abnormalities, nontender cervix, cerclage in place. No contractions on toco.  POC urine unremarkable. SSE negative, patient had neg cervical culture, wet mount 2/10/22 and no intercourse since that time. TVUS with reassuring cervical length 29mm (unchanged from 2 days ago)  PTL precaution reviewed. Reviewed assessment and plan of care with Dr. Devona Holter (covering for [facility] ) who is in agreement. D/C to home.

## 2022-03-06 NOTE — DISCHARGE INSTRUCTIONS
Patient Education        Belly Pain in Pregnancy: Care Instructions  Overview     When you're pregnant, any belly pain can be a worry. You may not want to call your doctor or midwife about every pain you have. But you don't want to miss something that is dangerous for you or your baby. Even if it feels familiar, belly pain can mean something new when you're pregnant. It's important to know when to call your doctor or midwife. It will also help to know how to care for yourself at home when your pain is not caused by anything harmful. · When belly pain is more severe or constant, see a doctor or midwife right away. · If you're sure your belly pain is a sign of labor, call your doctor or midwife. · When belly pain is brief, it's usually a normal part of pregnancy. It might be related to changes in the growing uterus. Or it could be the stretching of ligaments called round ligaments. These ligaments help support the uterus. Round ligament pain can be on either side of your belly. It can also be felt in your hips or groin. Follow-up care is a key part of your treatment and safety. Be sure to make and go to all appointments, and call your doctor if you are having problems. It's also a good idea to know your test results and keep a list of the medicines you take. How can you tell if belly pain is a sign of labor? When belly pain is caused by labor, it can feel like mild or menstrual-like cramps in your lower belly. These cramps are probably contractions. They can happen in your second or third trimester. You may also have:  · A steady, dull ache in your lower back, pelvis, or thighs. · A feeling of pressure in your pelvis or lower belly. · Changes in your vaginal discharge or a sudden release of fluid from the vagina. If you think you are in labor, call your doctor. How can you care for yourself at home? When belly pain is mild and is not a symptom of labor:  · Rest until you feel better.   · Take a warm bath.  · Think about what you drink and eat:  ? Drink plenty of fluids. Choose water and other clear liquids until you feel better. ? Try eating small, frequent meals. If your stomach is upset, try bland, low-fat foods like plain rice, broiled chicken, toast, and yogurt. · Think about how you move if you are having brief pains from stretching of the round ligaments. ? Try gentle stretching. ? Move a little more slowly when turning in bed or getting up from a chair, so those ligaments don't stretch quickly. ? Lean forward a bit if you think you are going to cough or sneeze. When should you call for help? Call 911  anytime you think you may need emergency care. For example, call if:    · You have sudden, severe pain in your belly.     · You have severe vaginal bleeding.     · You passed out (lost consciousness).     · You have a seizure. Call your doctor now or seek immediate medical care if:    · You have new or worse belly pain or cramping.     · You have any vaginal bleeding.     · You have a fever.     · You have symptoms of preeclampsia, such as:  ? Sudden swelling of your face, hands, or feet. ? New vision problems (such as dimness, blurring, or seeing spots). ? A severe headache.     · You think that you may be in labor. This means that you've had at least 8 contractions within 1 hour or at least 4 contractions within 20 minutes, even after you change your position and drink fluids.     · You have symptoms of a urinary tract infection. These may include:  ? Pain or burning when you urinate. ? A frequent need to urinate without being able to pass much urine. ? Pain in the flank, which is just below the rib cage and above the waist on either side of the back. ? Blood in your urine. Watch closely for changes in your health, and be sure to contact your doctor if you are worried about your or your baby's health. Where can you learn more?   Go to http://www.gray.com/  Enter B275 in the search box to learn more about \"Belly Pain in Pregnancy: Care Instructions. \"  Current as of: 2021               Content Version: 13.0  © 7756-4963 [employer]. Care instructions adapted under license by [employer] (which disclaims liability or warranty for this information). If you have questions about a medical condition or this instruction, always ask your healthcare professional. [place] [address] any warranty or liability for your use of this information. Patient Education        Pregnancy Precautions: Care Instructions  Your Care Instructions     There is no sure way to prevent labor before your due date ( labor) or to prevent most other pregnancy problems. But there are things you can do to increase your chances of a healthy pregnancy. Go to your appointments, follow your doctor's advice, and take good care of yourself. Eat well, and exercise (if your doctor agrees). And make sure to drink plenty of water. Follow-up care is a key part of your treatment and safety. Be sure to make and go to all appointments, and call your doctor if you are having problems. It's also a good idea to know your test results and keep a list of the medicines you take. How can you care for yourself at home? · Make sure you go to your prenatal appointments. At each visit, your doctor will check your blood pressure. Your doctor will also check to see if you have protein in your urine. High blood pressure and protein in urine are signs of preeclampsia. This condition can be dangerous for you and your baby. · Drink plenty of fluids. Dehydration can cause contractions. If you have kidney, heart, or liver disease and have to limit fluids, talk with your doctor before you increase the amount of fluids you drink. · Tell your doctor right away if you notice any symptoms of an infection, such as:  ? Burning when you urinate.   ? A foul-smelling discharge from your vagina. ? Vaginal itching. ? Unexplained fever. ? Unusual pain or soreness in your uterus or lower belly. · Eat a balanced diet. Include plenty of foods that are high in calcium and iron. ? Foods high in calcium include milk, cheese, yogurt, almonds, and broccoli. ? Foods high in iron include red meat, shellfish, poultry, eggs, beans, raisins, whole-grain bread, and leafy green vegetables. · Do not smoke. If you need help quitting, talk to your doctor about stop-smoking programs and medicines. These can increase your chances of quitting for good. · Do not drink alcohol or use marijuana or illegal drugs. · Follow your doctor's directions about activity. Your doctor will let you know how much, if any, exercise you can do. · Ask your doctor if you can have sex. If you are at risk for early labor, your doctor may ask you to not have sex. · Take care to prevent falls. During pregnancy, your joints are loose, and your balance is off. Sports such as bicycling, skiing, or in-line skating can increase your risk of falling. And don't ride horses or motorcycles, dive, water ski, scuba dive, or parachute jump while you are pregnant. · Avoid getting very hot. Do not use saunas or hot tubs. Avoid staying out in the sun in hot weather for long periods. Take acetaminophen (Tylenol) to lower a high fever. · Do not take any over-the-counter or herbal medicines or supplements without talking to your doctor or pharmacist first.  When should you call for help? Call 911  anytime you think you may need emergency care. For example, call if:    · You passed out (lost consciousness).     · You have a seizure.     · You have severe vaginal bleeding.     · You have severe pain in your belly or pelvis.     · You have had fluid gushing or leaking from your vagina and you know or think the umbilical cord is bulging into your vagina.  If this happens, immediately get down on your knees so your rear end (buttocks) is higher than your head. This will decrease the pressure on the cord until help arrives. Call your doctor now or seek immediate medical care if:    · You have signs of preeclampsia, such as:  ? Sudden swelling of your face, hands, or feet. ? New vision problems (such as dimness, blurring, or seeing spots). ? A severe headache.     · You have any vaginal bleeding.     · You have belly pain or cramping.     · You have a fever.     · You have had regular contractions (with or without pain) for an hour. This means that you have 8 or more within 1 hour or 4 or more in 20 minutes after you change your position and drink fluids.     · You have a sudden release of fluid from your vagina.     · You have low back pain or pelvic pressure that does not go away.     · You notice that your baby has stopped moving or is moving much less than normal.   Watch closely for changes in your health, and be sure to contact your doctor if you have any problems. Where can you learn more? Go to http://www.[employer].com/  Enter Y951 in the search box to learn more about \"Pregnancy Precautions: Care Instructions. \"  Current as of: June 16, 2021               Content Version: 13.0  © 4341-9624 Healthwise, [employer]. Care instructions adapted under license by [employer] (which disclaims liability or warranty for this information). If you have questions about a medical condition or this instruction, always ask your healthcare professional. [place] [address] any warranty or liability for your use of this information.

## 2022-03-06 NOTE — PROGRESS NOTES
Pt d/c home per Dr. Citlali Celaya. Verbal and written d/c instructions given on abdominal pain during pregnancy and pregnancy precautions. Pt verbalizes understanding and denies any questions. Will follow up at scheduled OB appointment. Ambulated out of JOHNIE.

## 2022-03-17 ENCOUNTER — HOSPITAL ENCOUNTER (OUTPATIENT)
Age: 27
Discharge: HOME | End: 2022-03-17
Attending: OBSTETRICS & GYNECOLOGY | Admitting: OBSTETRICS & GYNECOLOGY
Payer: COMMERCIAL

## 2022-03-17 VITALS
DIASTOLIC BLOOD PRESSURE: 71 MMHG | TEMPERATURE: 98 F | SYSTOLIC BLOOD PRESSURE: 129 MMHG | WEIGHT: 230 LBS | HEART RATE: 99 BPM | HEIGHT: 67 IN | BODY MASS INDEX: 36.1 KG/M2 | RESPIRATION RATE: 18 BRPM

## 2022-03-17 PROCEDURE — 99283 EMERGENCY DEPT VISIT LOW MDM: CPT | Performed by: OBSTETRICS & GYNECOLOGY

## 2022-03-17 PROCEDURE — 59025 FETAL NON-STRESS TEST: CPT

## 2022-03-17 PROCEDURE — 76815 OB US LIMITED FETUS(S): CPT

## 2022-03-17 NOTE — PROGRESS NOTES
Pt discharged home. Agrees to come back to hospital if leaking of fluid, bleeding, decreased fetal movement, or if labor contractions occur. Verbalizes understanding.

## 2022-03-17 NOTE — H&P
CC: sharp pelvic pain    27 y.o. female [id number] at 26w0d  weeks gestation who requests evaluation for intermittent sharp pelvic pain, in her vagina, also radiates to her back. Seems to be worse with activity but does not stop altogether when at rest. Pain lasts 30 sec at a time, occurs randomly but up to several times per hour. No change in vaginal discharge, no vb, fever, vomiting, or uti sx. Her previous constipation has resolved. Her pregnancy issues include: short cervix.  Last us last week 2.4 cm, di di twins, hx of preeclampsia/chronic hypertension, short interpregnancy interval.     Fetal movement has beennormal .    HISTORY:  OB History    Para Term  AB Living   9 4 1 3 4 3   SAB IAB Ectopic Molar Multiple Live Births   4 0 0 0 0 3      # Outcome Date GA Lbr Timoteo/2nd Weight Sex Delivery Anes PTL Lv   9 Current            8  21 34w6d 02:04 2.055 kg F Vag-Spont EPI Y ZAYRA      Complications: Fetal Intolerance   7 SAB 20 12w0d             Birth Comments: Trisomy 18   6  19 30w0d   F Vag-Spont  N ZAYRA      Complications: Pre-eclampsia   5 SAB  8w0d             Birth Comments: D&C required   4 SAB  8w0d             Birth Comments: D&C required   3 SAB 2015 8w0d             Birth Comments: D&C required   2  14 19w0d  0.05 kg U VAGINAL DELI None N FD      Birth Comments: [facility] early in pregnancy      Complications: Abruptio Placenta   1 Term 09/09/10 39w0d  2.92 kg M Vag-Spont EPIDURAL AN N ZAYRA      Birth Comments: PreE       Past Surgical History:   Procedure Laterality Date    HX GYN      HX OTHER SURGICAL      D&C     HX OTHER SURGICAL      D&C     HX OTHER SURGICAL  2022    cerclage       Past Medical History:   Diagnosis Date    Abnormal genetic test during pregnancy 10/26/2020    G6 - NIPT elevated risk for trisomy 18, missed AB with suction D&C    Anemia     Genital herpes simplex 3/22/2018    Hypertension gestational BP    Missed ab 2/10/15    Postpartum depression 6/15/2021    Preeclampsia      delivery     Subchorionic bleed 2014    Vulvovaginal rash 3/9/2021       Allergies   Allergen Reactions    Bactrim [Sulfamethoprim Ds] Nausea and Vomiting       Family History   Problem Relation Age of Onset    Asthma Father     Diabetes Father     Bleeding Prob Brother         Brain    Diabetes Brother     Other Brother         brain tumor    Other Paternal Grandmother         Lung cancer    Other Mother         cervical cancer    Other Maternal Grandmother         cervical cancer    Breast Cancer Neg Hx     Uterine Cancer Neg Hx     Ovarian Cancer Neg Hx     Collagen Dis Neg Hx     Colon Cancer Neg Hx        Social History     Socioeconomic History    Marital status:      Spouse name: Not on file    Number of children: Not on file    Years of education: Not on file    Highest education level: Not on file   Occupational History    Not on file   Tobacco Use    Smoking status: Former Smoker     Packs/day: 0.25     Years: 0.50     Pack years: 0.12     Quit date: 2020     Years since quittin.4    Smokeless tobacco: Never Used   Vaping Use    Vaping Use: Never used   Substance and Sexual Activity    Alcohol use: No    Drug use: No    Sexual activity: Yes     Partners: Male   Other Topics Concern     Service Not Asked    Blood Transfusions Not Asked    Caffeine Concern No    Occupational Exposure Not Asked    Hobby Hazards Not Asked    Sleep Concern Not Asked    Stress Concern Not Asked    Weight Concern Not Asked    Special Diet Not Asked    Back Care Not Asked    Exercise No    Bike Helmet Not Asked    Seat Belt Yes    Self-Exams Yes   Social History Narrative    Abuse: Feels safe at home, no history of physical abuse, no history of sexual abuse     Social Determinants of Health     Financial Resource Strain:     Difficulty of Paying Living Expenses: Not on file   Food Insecurity:     Worried About Running Out of Food in the Last Year: Not on file    Efrain of Food in the Last Year: Not on file   Transportation Needs:     Lack of Transportation (Medical): Not on file    Lack of Transportation (Non-Medical): Not on file   Physical Activity:     Days of Exercise per Week: Not on file    Minutes of Exercise per Session: Not on file   Stress:     Feeling of Stress : Not on file   Social Connections:     Frequency of Communication with Friends and Family: Not on file    Frequency of Social Gatherings with Friends and Family: Not on file    Attends Jain Services: Not on file    Active Member of [address] [place] [address] [employer] or Organizations: Not on file    Attends Club or Organization Meetings: Not on file    Marital Status: Not on file   Intimate Partner Violence:     Fear of Current or Ex-Partner: Not on file    Emotionally Abused: Not on file    Physically Abused: Not on file    Sexually Abused: Not on file   Housing Stability:     Unable to Pay for Housing in the Last Year: Not on file    Number of [place] in the Last Year: Not on file    Unstable Housing in the Last Year: Not on file       ROS:  Negative:   negative 10 point ROS except as noted in HPI    Positive:   per hpi    PHYSICAL EXAM:  Blood pressure 129/71, pulse 99, temperature 98 °F (36.7 °C), resp. rate 18, height 5' 7\" (1.702 m), weight 104.3 kg (230 lb), not currently breastfeeding. The patient appears well, alert, oriented x 3. Appropriate affect. Lungs are clear. Heart RRR, no murmurs. Abdomen soft, non-tender, no rebound/guarding, normoactive bs. Fundus soft and non tender  Skin warm, dry, no rashes  Ext no edema, DTR's normal    Cervix: ftp/no tension on cerclage or TREV, length feels c/w us     Fetal Heart Rate: aga tracing x2, no ctx.    I personally reviewed and interpreted the FHR tracing      Tests performed and reviwed:   UA: normal    TVUS: cervical length 2.6-4.1 on 3 measurements. dynamic funneling seen to level of stitch. Fetal small parts just above cervix i performed and read the us. pictures scanned into record. no previa noted. I have personally reviewed the patient's history, prenatal record, and pertinent test results. vital sign trends, radiology reports, laboratory results, previous provider notes support my clinical impression. Assessment:  27 y.o. female at 26w0d  cerclage, no actue changes, not in  labor  Reassuring fetal status    Plan:  Findings and test results were discussed.   Time spent with patient consistent with level of care    Signed By:  Paresh Barrera MD     2022

## 2022-03-17 NOTE — PROGRESS NOTES
Pt came in to [facility] triage with with twin pregnancy and cerclage in place, complaints of worsening pain and pressure low into her pelvis and vaginal area over the last few days. States positive fetal movement, denies vaginal bleeding or leaking. Called Dr. Jj Eaton to update.

## 2022-03-17 NOTE — DISCHARGE INSTRUCTIONS
Patient Education   Patient Education        Counting Your Baby's Kicks: Care Instructions  Overview     Counting your baby's kicks is one way your doctor can tell that your baby is healthy. Most women--especially in a first pregnancy--feel their baby move for the first time between 16 and 22 weeks. The movement may feel like flutters rather than kicks. Your baby may move more at certain times of the day. When you are active, you may notice less kicking than when you are resting. At your prenatal visits, your doctor will ask whether the baby is active. In your last trimester, your doctor may ask you to count the number of times you feel your baby move. Follow-up care is a key part of your treatment and safety. Be sure to make and go to all appointments, and call your doctor if you are having problems. It's also a good idea to know your test results and keep a list of the medicines you take. How do you count fetal kicks? · A common method of checking your baby's movement is to note the length of time it takes to count ten movements (such as kicks, flutters, or rolls). · Pick your baby's most active time of day to count. This may be any time from morning to evening. · If you don't feel 10 movements in an hour, have something to eat or drink and count for another hour. If you don't feel at least 10 movements in the 2-hour period, call your doctor. When should you call for help? Call your doctor now or seek immediate medical care if:    · You noticed that your baby has stopped moving or is moving much less than normal.   Watch closely for changes in your health, and be sure to contact your doctor if you have any problems. Where can you learn more? Go to http://www.gray.com/  Enter [id number] in the search box to learn more about \"Counting Your Baby's Kicks: Care Instructions. \"  Current as of: June 16, 2021               Content Version: 13.2  © 7762-2183 Healthwise, [facility].    Care instructions adapted under license by [employer] (which disclaims liability or warranty for this information). If you have questions about a medical condition or this instruction, always ask your healthcare professional. [address] any warranty or liability for your use of this information. Weeks 22 to 26 of Your Pregnancy: Care Instructions  Overview     As you enter your 7th month of pregnancy at week 26, your baby's lungs are growing stronger and getting ready to breathe. You may notice that your baby responds to the sound of your voice. You may also notice that your baby does less turning and twisting and more squirming, kicking, or jerking. Jerking often means that your baby has hiccups. Hiccups are normal and are only temporary. You may want to think about attending a childbirth preparation class. This is also a good time to start thinking about whether you want to have pain medicine during labor. You may be tested for gestational diabetes between weeks 25 and 28. Gestational diabetes occurs when your blood sugar level gets too high when you're pregnant. The test is important, because you can have gestational diabetes and not know it. But the condition can cause problems for your baby. Follow-up care is a key part of your treatment and safety. Be sure to make and go to all appointments, and call your doctor if you are having problems. It's also a good idea to know your test results and keep a list of the medicines you take. How can you care for yourself at home? Ease discomfort from your baby's kicking  · Change your position. Sometimes this will cause your baby to change position too. · Take a deep breath while you raise your arm over your head. Then breathe out while you drop your arm. Do Kegel exercises to prevent urine from leaking  · You can do Kegel exercises while you stand or sit. ? Squeeze the same muscles you would use to stop your urine.  Your belly and thighs should not move. ? Hold the squeeze for 3 seconds, and then relax for 3 seconds. ? Start with 3 seconds. Then add 1 second each week until you are able to squeeze for 10 seconds. ? Repeat the exercise 10 to 15 times for each session. Do three or more sessions each day. Ease or reduce swelling in your feet, ankles, hands, and fingers  · If your fingers are puffy, take off your rings. · Do not eat high-salt foods, such as potato chips. · Prop up your feet on a stool or couch as much as possible. Sleep with pillows under your feet. · Do not stand for long periods of time or wear tight shoes. · Wear support stockings. Where can you learn more? Go to http://www.dav[employer].com/  Enter G264 in the search box to learn more about \"Weeks 22 to 26 of Your Pregnancy: Care Instructions. \"  Current as of: June 16, 2021               Content Version: 13.2  © 2006-2022 Healthwise, Incorporated. Care instructions adapted under license by [employer] (which disclaims liability or warranty for this information). If you have questions about a medical condition or this instruction, always ask your healthcare professional. [place] [address] any warranty or liability for your use of this information.

## 2022-03-19 PROBLEM — K59.00 CONSTIPATION DURING PREGNANCY IN SECOND TRIMESTER: Status: ACTIVE | Noted: 2022-01-27

## 2022-03-19 PROBLEM — Z86.59 HISTORY OF POSTPARTUM DEPRESSION: Status: ACTIVE | Noted: 2021-06-15

## 2022-03-19 PROBLEM — R10.2 PELVIC PAIN AFFECTING PREGNANCY IN SECOND TRIMESTER, ANTEPARTUM: Status: ACTIVE | Noted: 2022-03-06

## 2022-03-19 PROBLEM — O26.20 HABITUAL ABORTER, CURRENTLY PREGNANT: Status: ACTIVE | Noted: 2022-01-11

## 2022-03-19 PROBLEM — B00.9 HERPES SIMPLEX VIRUS TYPE 2 (HSV-2) INFECTION AFFECTING PREGNANCY IN SECOND TRIMESTER: Status: ACTIVE | Noted: 2021-11-12

## 2022-03-25 PROBLEM — Z34.83 MULTIGRAVIDA IN THIRD TRIMESTER: Status: ACTIVE | Noted: 2021-11-12

## 2022-03-25 PROBLEM — O26.873 SHORT CERVICAL LENGTH DURING PREGNANCY IN THIRD TRIMESTER: Status: ACTIVE | Noted: 2022-01-25

## 2022-03-25 PROBLEM — O34.33 CERVICAL CERCLAGE SUTURE PRESENT IN THIRD TRIMESTER: Status: ACTIVE | Noted: 2022-01-31

## 2022-03-25 PROBLEM — O30.043 DICHORIONIC DIAMNIOTIC TWIN PREGNANCY IN THIRD TRIMESTER: Status: ACTIVE | Noted: 2021-11-12

## 2022-03-25 PROBLEM — O10.913 PRE-EXISTING HYPERTENSION AFFECTING PREGNANCY IN THIRD TRIMESTER: Status: ACTIVE | Noted: 2022-01-31

## 2022-03-25 PROBLEM — O09.293 HISTORY OF PRE-ECLAMPSIA IN PRIOR PREGNANCY, CURRENTLY PREGNANT IN THIRD TRIMESTER: Status: ACTIVE | Noted: 2021-11-12

## 2022-03-25 PROBLEM — O26.872 SHORT CERVICAL LENGTH DURING PREGNANCY, SECOND TRIMESTER: Status: ACTIVE | Noted: 2022-01-25

## 2022-03-28 PROBLEM — O34.32 CERVICAL CERCLAGE SUTURE PRESENT IN SECOND TRIMESTER: Status: ACTIVE | Noted: 2022-01-31

## 2022-03-28 PROBLEM — O99.012 ANEMIA DURING PREGNANCY IN SECOND TRIMESTER: Status: ACTIVE | Noted: 2022-03-28

## 2022-03-28 PROBLEM — O99.013 ANEMIA DURING PREGNANCY IN THIRD TRIMESTER: Status: ACTIVE | Noted: 2022-03-28

## 2022-03-28 PROBLEM — O10.912 PRE-EXISTING HYPERTENSION AFFECTING PREGNANCY IN SECOND TRIMESTER: Status: ACTIVE | Noted: 2022-01-31

## 2022-04-11 PROBLEM — O09.293 HISTORY OF PRE-ECLAMPSIA IN PRIOR PREGNANCY, CURRENTLY PREGNANT IN THIRD TRIMESTER: Status: ACTIVE | Noted: 2021-11-12

## 2022-04-11 PROBLEM — O30.043 DICHORIONIC DIAMNIOTIC TWIN PREGNANCY IN THIRD TRIMESTER: Status: ACTIVE | Noted: 2021-11-12

## 2022-04-11 PROBLEM — O26.873 SHORT CERVICAL LENGTH DURING PREGNANCY IN THIRD TRIMESTER: Status: ACTIVE | Noted: 2022-01-25

## 2022-04-11 PROBLEM — O98.513 HERPES SIMPLEX VIRUS TYPE 2 (HSV-2) INFECTION AFFECTING PREGNANCY IN THIRD TRIMESTER: Status: ACTIVE | Noted: 2021-11-12

## 2022-04-11 PROBLEM — O34.33 CERVICAL CERCLAGE SUTURE PRESENT IN THIRD TRIMESTER: Status: ACTIVE | Noted: 2022-01-31

## 2022-04-11 PROBLEM — O09.43 HIGH RISK MULTIGRAVIDA, THIRD TRIMESTER: Status: ACTIVE | Noted: 2021-11-12

## 2022-04-19 ENCOUNTER — HOSPITAL ENCOUNTER (INPATIENT)
Age: 27
LOS: 1 days | Discharge: ANOTHER HOSPITAL | DRG: 563 | End: 2022-04-20
Attending: OBSTETRICS & GYNECOLOGY | Admitting: OBSTETRICS & GYNECOLOGY
Payer: COMMERCIAL

## 2022-04-19 PROCEDURE — 75810000275 HC EMERGENCY DEPT VISIT NO LEVEL OF CARE

## 2022-04-19 PROCEDURE — 82731 ASSAY OF FETAL FIBRONECTIN: CPT

## 2022-04-19 PROCEDURE — 74011250637 HC RX REV CODE- 250/637: Performed by: OBSTETRICS & GYNECOLOGY

## 2022-04-19 RX ORDER — NIFEDIPINE 10 MG/1
20 CAPSULE ORAL ONCE
Status: COMPLETED | OUTPATIENT
Start: 2022-04-20 | End: 2022-04-19

## 2022-04-19 RX ORDER — ACETAMINOPHEN 325 MG/1
650 TABLET ORAL ONCE
Status: COMPLETED | OUTPATIENT
Start: 2022-04-20 | End: 2022-04-19

## 2022-04-19 RX ADMIN — ACETAMINOPHEN 650 MG: 325 TABLET, FILM COATED ORAL at 23:46

## 2022-04-19 RX ADMIN — NIFEDIPINE 20 MG: 10 CAPSULE ORAL at 23:46

## 2022-04-20 VITALS
DIASTOLIC BLOOD PRESSURE: 56 MMHG | HEART RATE: 90 BPM | TEMPERATURE: 98 F | OXYGEN SATURATION: 99 % | RESPIRATION RATE: 18 BRPM | SYSTOLIC BLOOD PRESSURE: 123 MMHG

## 2022-04-20 PROBLEM — O30.049 TWIN GESTATION, DICHORIONIC DIAMNIOTIC: Status: ACTIVE | Noted: 2022-04-20

## 2022-04-20 PROBLEM — O47.03 PRETERM UTERINE CONTRACTIONS, ANTEPARTUM, THIRD TRIMESTER: Status: ACTIVE | Noted: 2022-04-20

## 2022-04-20 PROBLEM — O60.03 PRETERM LABOR IN THIRD TRIMESTER: Status: ACTIVE | Noted: 2022-04-20

## 2022-04-20 LAB
ABO + RH BLD: NORMAL
ALBUMIN SERPL-MCNC: 2.3 G/DL (ref 3.5–5)
ALBUMIN/GLOB SERPL: 0.6 {RATIO} (ref 1.2–3.5)
ALP SERPL-CCNC: 327 U/L (ref 50–136)
ALT SERPL-CCNC: 8 U/L (ref 12–65)
ANION GAP SERPL CALC-SCNC: 9 MMOL/L (ref 7–16)
AST SERPL-CCNC: 15 U/L (ref 15–37)
BASOPHILS # BLD: 0 K/UL (ref 0–0.2)
BASOPHILS NFR BLD: 0 % (ref 0–2)
BILIRUB SERPL-MCNC: 0.2 MG/DL (ref 0.2–1.1)
BLOOD GROUP ANTIBODIES SERPL: NORMAL
BUN SERPL-MCNC: 3 MG/DL (ref 6–23)
CALCIUM SERPL-MCNC: 8.6 MG/DL (ref 8.3–10.4)
CHLORIDE SERPL-SCNC: 108 MMOL/L (ref 98–107)
CO2 SERPL-SCNC: 22 MMOL/L (ref 21–32)
CREAT SERPL-MCNC: 0.55 MG/DL (ref 0.6–1)
DIFFERENTIAL METHOD BLD: ABNORMAL
EOSINOPHIL # BLD: 0 K/UL (ref 0–0.8)
EOSINOPHIL NFR BLD: 1 % (ref 0.5–7.8)
ERYTHROCYTE [DISTWIDTH] IN BLOOD BY AUTOMATED COUNT: 14 % (ref 11.9–14.6)
FIBRONECTIN FETAL VAG QL: POSITIVE
GLOBULIN SER CALC-MCNC: 4 G/DL (ref 2.3–3.5)
GLUCOSE SERPL-MCNC: 88 MG/DL (ref 65–100)
HCT VFR BLD AUTO: 25.8 % (ref 35.8–46.3)
HGB BLD-MCNC: 8.4 G/DL (ref 11.7–15.4)
IMM GRANULOCYTES # BLD AUTO: 0 K/UL (ref 0–0.5)
IMM GRANULOCYTES NFR BLD AUTO: 1 % (ref 0–5)
LYMPHOCYTES # BLD: 1.5 K/UL (ref 0.5–4.6)
LYMPHOCYTES NFR BLD: 30 % (ref 13–44)
MCH RBC QN AUTO: 29.6 PG (ref 26.1–32.9)
MCHC RBC AUTO-ENTMCNC: 32.6 G/DL (ref 31.4–35)
MCV RBC AUTO: 90.8 FL (ref 79.6–97.8)
MONOCYTES # BLD: 0.6 K/UL (ref 0.1–1.3)
MONOCYTES NFR BLD: 12 % (ref 4–12)
NEUTS SEG # BLD: 3 K/UL (ref 1.7–8.2)
NEUTS SEG NFR BLD: 57 % (ref 43–78)
NRBC # BLD: 0 K/UL (ref 0–0.2)
PLATELET # BLD AUTO: 146 K/UL (ref 150–450)
PMV BLD AUTO: 11.5 FL (ref 9.4–12.3)
POTASSIUM SERPL-SCNC: 3.4 MMOL/L (ref 3.5–5.1)
PROT SERPL-MCNC: 6.3 G/DL (ref 6.3–8.2)
RBC # BLD AUTO: 2.84 M/UL (ref 4.05–5.2)
SODIUM SERPL-SCNC: 139 MMOL/L (ref 136–145)
SPECIMEN EXP DATE BLD: NORMAL
WBC # BLD AUTO: 5.2 K/UL (ref 4.3–11.1)

## 2022-04-20 PROCEDURE — 74011000250 HC RX REV CODE- 250: Performed by: OBSTETRICS & GYNECOLOGY

## 2022-04-20 PROCEDURE — 65270000029 HC RM PRIVATE

## 2022-04-20 PROCEDURE — 96360 HYDRATION IV INFUSION INIT: CPT

## 2022-04-20 PROCEDURE — G0378 HOSPITAL OBSERVATION PER HR: HCPCS

## 2022-04-20 PROCEDURE — 85025 COMPLETE CBC W/AUTO DIFF WBC: CPT

## 2022-04-20 PROCEDURE — 74011000258 HC RX REV CODE- 258: Performed by: OBSTETRICS & GYNECOLOGY

## 2022-04-20 PROCEDURE — 80053 COMPREHEN METABOLIC PANEL: CPT

## 2022-04-20 PROCEDURE — 74011250636 HC RX REV CODE- 250/636: Performed by: OBSTETRICS & GYNECOLOGY

## 2022-04-20 PROCEDURE — 99285 EMERGENCY DEPT VISIT HI MDM: CPT

## 2022-04-20 PROCEDURE — 59025 FETAL NON-STRESS TEST: CPT

## 2022-04-20 PROCEDURE — 86900 BLOOD TYPING SEROLOGIC ABO: CPT

## 2022-04-20 RX ORDER — SODIUM CHLORIDE 0.9 % (FLUSH) 0.9 %
5-40 SYRINGE (ML) INJECTION EVERY 8 HOURS
Status: DISCONTINUED | OUTPATIENT
Start: 2022-04-20 | End: 2022-04-20 | Stop reason: HOSPADM

## 2022-04-20 RX ORDER — ONDANSETRON 2 MG/ML
4 INJECTION INTRAMUSCULAR; INTRAVENOUS
Status: DISCONTINUED | OUTPATIENT
Start: 2022-04-20 | End: 2022-04-20 | Stop reason: HOSPADM

## 2022-04-20 RX ORDER — MAGNESIUM SULFATE HEPTAHYDRATE 40 MG/ML
4 INJECTION, SOLUTION INTRAVENOUS ONCE
Status: COMPLETED | OUTPATIENT
Start: 2022-04-20 | End: 2022-04-20

## 2022-04-20 RX ORDER — MAGNESIUM SULFATE HEPTAHYDRATE 40 MG/ML
2 INJECTION, SOLUTION INTRAVENOUS ONCE
Status: COMPLETED | OUTPATIENT
Start: 2022-04-20 | End: 2022-04-20

## 2022-04-20 RX ORDER — BETAMETHASONE SODIUM PHOSPHATE AND BETAMETHASONE ACETATE 3; 3 MG/ML; MG/ML
12 INJECTION, SUSPENSION INTRA-ARTICULAR; INTRALESIONAL; INTRAMUSCULAR; SOFT TISSUE EVERY 24 HOURS
Status: DISCONTINUED | OUTPATIENT
Start: 2022-04-20 | End: 2022-04-20 | Stop reason: HOSPADM

## 2022-04-20 RX ORDER — BUTORPHANOL TARTRATE 2 MG/ML
1 INJECTION INTRAMUSCULAR; INTRAVENOUS ONCE
Status: COMPLETED | OUTPATIENT
Start: 2022-04-20 | End: 2022-04-20

## 2022-04-20 RX ORDER — PRENATAL VIT 96/IRON FUM/FOLIC 27MG-0.8MG
1 TABLET ORAL DAILY
Status: DISCONTINUED | OUTPATIENT
Start: 2022-04-20 | End: 2022-04-20 | Stop reason: HOSPADM

## 2022-04-20 RX ORDER — MAGNESIUM SULFATE HEPTAHYDRATE 40 MG/ML
2 INJECTION, SOLUTION INTRAVENOUS CONTINUOUS
Status: DISCONTINUED | OUTPATIENT
Start: 2022-04-20 | End: 2022-04-20 | Stop reason: HOSPADM

## 2022-04-20 RX ORDER — SODIUM CHLORIDE 0.9 % (FLUSH) 0.9 %
5-40 SYRINGE (ML) INJECTION AS NEEDED
Status: DISCONTINUED | OUTPATIENT
Start: 2022-04-20 | End: 2022-04-20 | Stop reason: HOSPADM

## 2022-04-20 RX ADMIN — MAGNESIUM SULFATE HEPTAHYDRATE 4 G: 40 INJECTION, SOLUTION INTRAVENOUS at 01:21

## 2022-04-20 RX ADMIN — BETAMETHASONE SODIUM PHOSPHATE AND BETAMETHASONE ACETATE 12 MG: 3; 3 INJECTION, SUSPENSION INTRA-ARTICULAR; INTRALESIONAL; INTRAMUSCULAR at 01:02

## 2022-04-20 RX ADMIN — FAMOTIDINE 20 MG: 10 INJECTION, SOLUTION INTRAVENOUS at 01:02

## 2022-04-20 RX ADMIN — MAGNESIUM SULFATE HEPTAHYDRATE 2 G: 40 INJECTION, SOLUTION INTRAVENOUS at 03:38

## 2022-04-20 RX ADMIN — AMPICILLIN SODIUM 2 G: 2 INJECTION, POWDER, FOR SOLUTION INTRAMUSCULAR; INTRAVENOUS at 01:40

## 2022-04-20 RX ADMIN — MAGNESIUM SULFATE IN WATER 2 G/HR: 40 INJECTION, SOLUTION INTRAVENOUS at 01:47

## 2022-04-20 RX ADMIN — BUTORPHANOL TARTRATE 1 MG: 2 INJECTION, SOLUTION INTRAMUSCULAR; INTRAVENOUS at 03:40

## 2022-04-20 NOTE — H&P
CC:  contractions    27 y.o. female [id number] at 30w6d  weeks gestation who requests evaluation for  contractions that started earlier today at work and went home. Is taking nifedipine 20 mg prn contractions. Took a dose of nifedipine at 7 pm.     Her pregnancy issues include: anemia, cerclage, chronic htn, di-di twins, hsv 2    Fetal movement has beennormal x2.     HISTORY:  OB History    Para Term  AB Living   9 4 1 3 4 3   SAB IAB Ectopic Molar Multiple Live Births   4 0 0 0 0 3      # Outcome Date GA Lbr Timoteo/2nd Weight Sex Delivery Anes PTL Lv   9 Current            8  21 34w6d 02:04 2.055 kg F Vag-Spont EPI Y ZAYRA      Complications: Fetal Intolerance   7 SAB 20 12w0d             Birth Comments: Trisomy 18   6  19 30w0d   F Vag-Spont  N ZAYRA      Complications: Pre-eclampsia   5 2018 8w0d             Birth Comments: D&C required   4 2016 8w0d             Birth Comments: D&C required   3 SAB 2015 8w0d             Birth Comments: D&C required   2  14 19w0d  0.05 kg U VAGINAL DELI None N FD      Birth Comments: [facility] early in pregnancy      Complications: Abruptio Placenta   1 Term 09/09/10 39w0d  2.92 kg M Vag-Spont EPIDURAL AN N ZAYRA      Birth Comments: PreE       Past Surgical History:   Procedure Laterality Date    HX GYN      HX OTHER SURGICAL      D&C     HX OTHER SURGICAL      D&C     HX OTHER SURGICAL  2022    cerclage       Past Medical History:   Diagnosis Date    Abnormal genetic test during pregnancy 10/26/2020    G6 - NIPT elevated risk for trisomy 18, missed AB with suction D&C    Anemia     Genital herpes simplex 3/22/2018    Hypertension     gestational BP    Missed ab 2/10/15    Postpartum depression 6/15/2021    Preeclampsia      delivery     Subchorionic bleed 2014    Vulvovaginal rash 3/9/2021       Allergies   Allergen Reactions    Bactrim [Sulfamethoprim Ds] Nausea and Vomiting Family History   Problem Relation Age of Onset    Asthma Father     Diabetes Father     Bleeding Prob Brother         Brain    Diabetes Brother     Other Brother         brain tumor    Other Paternal Grandmother         Lung cancer    Other Mother         cervical cancer    Other Maternal Grandmother         cervical cancer    Breast Cancer Neg Hx     Uterine Cancer Neg Hx     Ovarian Cancer Neg Hx     Collagen Dis Neg Hx     Colon Cancer Neg Hx        Social History     Socioeconomic History    Marital status:      Spouse name: Not on file    Number of children: Not on file    Years of education: Not on file    Highest education level: Not on file   Occupational History    Not on file   Tobacco Use    Smoking status: Former Smoker     Packs/day: 0.25     Years: 0.50     Pack years: 0.12     Quit date: 2020     Years since quittin.5    Smokeless tobacco: Never Used   Vaping Use    Vaping Use: Never used   Substance and Sexual Activity    Alcohol use: No    Drug use: No    Sexual activity: Yes     Partners: Male   Other Topics Concern     Service Not Asked    Blood Transfusions Not Asked    Caffeine Concern No    Occupational Exposure Not Asked    Hobby Hazards Not Asked    Sleep Concern Not Asked    Stress Concern Not Asked    Weight Concern Not Asked    Special Diet Not Asked    Back Care Not Asked    Exercise No    Bike Helmet Not Asked    Seat Belt Yes    Self-Exams Yes   Social History Narrative    Abuse: Feels safe at home, no history of physical abuse, no history of sexual abuse     Social Determinants of Health     Financial Resource Strain:     Difficulty of Paying Living Expenses: Not on file   Food Insecurity:     Worried About Running Out of Food in the Last Year: Not on file    Efrain of Food in the Last Year: Not on file   Transportation Needs:     Lack of Transportation (Medical):  Not on file    Lack of Transportation (Non-Medical): Not on file   Physical Activity:     Days of Exercise per Week: Not on file    Minutes of Exercise per Session: Not on file   Stress:     Feeling of Stress : Not on file   Social Connections:     Frequency of Communication with Friends and Family: Not on file    Frequency of Social Gatherings with Friends and Family: Not on file    Attends Church Services: Not on file    Active Member of [employer] or Organizations: Not on file    Attends Club or Organization Meetings: Not on file    Marital Status: Not on file   Intimate Partner Violence:     Fear of Current or Ex-Partner: Not on file    Emotionally Abused: Not on file    Physically Abused: Not on file    Sexually Abused: Not on file   Housing Stability:     Unable to Pay for Housing in the Last Year: Not on file    Number of [place] in the Last Year: Not on file    Unstable Housing in the Last Year: Not on file       ROS:  Negative:   negative 10 point ROS except as noted in HPI    Positive:   per hpi    PHYSICAL EXAM:  not currently breastfeeding. The patient appears well, alert, oriented x 3. Appropriate affect. Lungs are clear. Heart RRR, no murmurs. Abdomen soft, non-tender, no rebound/guarding, normoactive bs. Fundus soft and non tender  Skin warm, dry, no rashes  Ext no edema, DTR's normal  ffn obtained  Cervix: 60%/1cm/small parts palpable    Fetal Heart Rate: a 140/ b 150 reactive, no decels, mod variability  I personally reviewed and interpreted the FHR tracing    I have personally reviewed the patient's history, prenatal record, and pertinent test results. vital sign trends, laboratory results, previous provider notes support my clinical impression. Assessment:  27 y.o. female at 30w6d   contractions. pos ffn.   Reassuring fetal statusx2    Plan:  lr bolus. ffn sent. 1 additional dose of nifedipine 20 mg now.   Time spent with patient consistent with level of care    Signed By:  Stephenie Acevedo MD April 20, 2022        Addendum:  ffn positive. Patient continues to contract after nifedipine and fluid bolus. Will admit give steroids and antibiotics, mag but monitor fluid and resp status. If contrations continue will need to transfer to [facility] for level 3 nicu  Erum Castano MD

## 2022-04-20 NOTE — PROGRESS NOTES
Mag bolus completed but contractions continue. Patient states at times more uncomfortable than before. Contractions now are every 4 sometimes every 3  Will rebolus with 2 gm mag and have contacted [facility] to arrange transfer of care due to early gestational age and possible ptd of twins. Erum Shrestha MD

## 2022-04-20 NOTE — PROGRESS NOTES
Patient presents to triage with complaints of contractions started at 7pm. Patient state that started mild and becoming more painful. Patient states that babies are moving normally. Denies LOF and VB. US x2 and Carrick placed on soft, non-tender abdomen.

## 2022-05-19 PROBLEM — O30.043 DICHORIONIC DIAMNIOTIC TWIN PREGNANCY IN THIRD TRIMESTER: Status: RESOLVED | Noted: 2021-11-12 | Resolved: 2022-05-19

## 2022-05-19 PROBLEM — O34.33 CERVICAL CERCLAGE SUTURE PRESENT IN THIRD TRIMESTER: Status: RESOLVED | Noted: 2022-01-31 | Resolved: 2022-05-19

## 2022-05-19 PROBLEM — O30.049 TWIN GESTATION, DICHORIONIC DIAMNIOTIC: Status: RESOLVED | Noted: 2022-04-20 | Resolved: 2022-05-19

## 2022-05-19 PROBLEM — O26.873 SHORT CERVICAL LENGTH DURING PREGNANCY IN THIRD TRIMESTER: Status: RESOLVED | Noted: 2022-01-25 | Resolved: 2022-05-19

## 2022-05-19 PROBLEM — Z48.89 POSTOPERATIVE VISIT: Status: ACTIVE | Noted: 2022-05-19

## 2022-05-19 PROBLEM — O99.013 ANEMIA DURING PREGNANCY IN THIRD TRIMESTER: Status: RESOLVED | Noted: 2022-03-28 | Resolved: 2022-05-19

## 2022-05-19 PROBLEM — O98.513 HERPES SIMPLEX VIRUS TYPE 2 (HSV-2) INFECTION AFFECTING PREGNANCY IN THIRD TRIMESTER: Status: RESOLVED | Noted: 2021-11-12 | Resolved: 2022-05-19

## 2022-05-19 PROBLEM — O09.899 SHORT INTERVAL BETWEEN PREGNANCIES AFFECTING PREGNANCY, ANTEPARTUM: Status: RESOLVED | Noted: 2021-11-12 | Resolved: 2022-05-19

## 2022-05-19 PROBLEM — B00.9 HERPES SIMPLEX VIRUS TYPE 2 (HSV-2) INFECTION AFFECTING PREGNANCY IN THIRD TRIMESTER: Status: RESOLVED | Noted: 2021-11-12 | Resolved: 2022-05-19

## 2022-05-19 PROBLEM — O09.43 HIGH RISK MULTIGRAVIDA, THIRD TRIMESTER: Status: RESOLVED | Noted: 2021-11-12 | Resolved: 2022-05-19

## 2022-05-19 PROBLEM — O60.03 PRETERM LABOR IN THIRD TRIMESTER: Status: RESOLVED | Noted: 2022-04-20 | Resolved: 2022-05-19

## 2022-05-19 PROBLEM — O09.293 HISTORY OF PRE-ECLAMPSIA IN PRIOR PREGNANCY, CURRENTLY PREGNANT IN THIRD TRIMESTER: Status: RESOLVED | Noted: 2021-11-12 | Resolved: 2022-05-19

## 2022-05-19 PROBLEM — O47.03 PRETERM UTERINE CONTRACTIONS, ANTEPARTUM, THIRD TRIMESTER: Status: RESOLVED | Noted: 2022-04-20 | Resolved: 2022-05-19

## 2022-05-19 PROBLEM — O10.913 PRE-EXISTING HYPERTENSION AFFECTING PREGNANCY IN THIRD TRIMESTER: Status: RESOLVED | Noted: 2022-01-31 | Resolved: 2022-05-19

## 2022-06-09 ENCOUNTER — OFFICE VISIT (OUTPATIENT)
Dept: OBGYN CLINIC | Age: 27
End: 2022-06-09
Payer: COMMERCIAL

## 2022-06-09 VITALS
DIASTOLIC BLOOD PRESSURE: 60 MMHG | BODY MASS INDEX: 32.8 KG/M2 | WEIGHT: 209 LBS | HEIGHT: 67 IN | SYSTOLIC BLOOD PRESSURE: 90 MMHG

## 2022-06-09 DIAGNOSIS — Z86.59 HISTORY OF POSTPARTUM DEPRESSION: ICD-10-CM

## 2022-06-09 DIAGNOSIS — Z12.4 PAP SMEAR FOR CERVICAL CANCER SCREENING: Primary | ICD-10-CM

## 2022-06-09 DIAGNOSIS — Z87.59 HISTORY OF POSTPARTUM DEPRESSION: ICD-10-CM

## 2022-06-09 DIAGNOSIS — N89.8 VAGINAL DISCHARGE: ICD-10-CM

## 2022-06-09 DIAGNOSIS — Z12.4 PAP SMEAR FOR CERVICAL CANCER SCREENING: ICD-10-CM

## 2022-06-09 LAB
BILIRUBIN, URINE, POC: NEGATIVE
BLOOD URINE, POC: NEGATIVE
GLUCOSE URINE, POC: NEGATIVE
KETONES, URINE, POC: NEGATIVE
LEUKOCYTE ESTERASE, URINE, POC: NEGATIVE
NITRITE, URINE, POC: NEGATIVE
PH, URINE, POC: 7.5 (ref 4.6–8)
PROTEIN,URINE, POC: NEGATIVE
SPECIFIC GRAVITY, URINE, POC: 1.02 (ref 1–1.03)
URINALYSIS CLARITY, POC: NORMAL
URINALYSIS COLOR, POC: NORMAL
UROBILINOGEN, POC: NORMAL

## 2022-06-09 PROCEDURE — 81002 URINALYSIS NONAUTO W/O SCOPE: CPT | Performed by: NURSE PRACTITIONER

## 2022-06-09 PROCEDURE — 99902 PR PRENATAL VISIT: CPT | Performed by: NURSE PRACTITIONER

## 2022-06-09 RX ORDER — ESCITALOPRAM OXALATE 20 MG/1
20 TABLET ORAL DAILY
Qty: 30 TABLET | Refills: 12 | Status: SHIPPED | OUTPATIENT
Start: 2022-06-09

## 2022-06-09 RX ORDER — ESCITALOPRAM OXALATE 10 MG/1
10 TABLET ORAL DAILY
COMMUNITY
End: 2022-06-09 | Stop reason: ALTCHOICE

## 2022-06-09 ASSESSMENT — PATIENT HEALTH QUESTIONNAIRE - PHQ9
2. FEELING DOWN, DEPRESSED OR HOPELESS: 1
SUM OF ALL RESPONSES TO PHQ QUESTIONS 1-9: 2
SUM OF ALL RESPONSES TO PHQ QUESTIONS 1-9: 2
1. LITTLE INTEREST OR PLEASURE IN DOING THINGS: 1
SUM OF ALL RESPONSES TO PHQ QUESTIONS 1-9: 2
SUM OF ALL RESPONSES TO PHQ QUESTIONS 1-9: 2
SUM OF ALL RESPONSES TO PHQ9 QUESTIONS 1 & 2: 2

## 2022-06-09 NOTE — PROGRESS NOTES
Patient comes in today for 3 week post partum visit. Pt reports symptoms of UTI. Patient reports RLQ pain. Patient reports difficulty sleeping. Delivery Method:     Delivery Date: 2022    Birth Control: tubal ligation    Breast/Bottle: Bottle    Bleeding: None    Baby: Doing well    Bowel/Bladder: No issues    Blues: Anxiety, not eating; no appetite. Pt reports when she does eat she eats like she hasn't eaten in days.      Last pap smear:  10/26/2020    Vitals:    22 1049   BP: 90/60   Weight: 209 lbs  Height: 5ft 7in    Bashir Olivo RN  10:59 AM  22

## 2022-06-09 NOTE — LETTER
[facility]  [address] [id number]  Phone: [phone]  Fax: [phone]    Ambrocio Hwang NP, FABIENNE - CNP        June 9, 2022     Patient: Jesus Krause   YOB: 1995   Date of Visit: 6/9/2022       To Whom It May Concern: It is my medical opinion that Jesus Krause may return to full duty immediately with no restrictions. If you have any questions or concerns, please don't hesitate to call.     Sincerely,        Ambrocio Hwang NP, FABIENNE - CNP

## 2022-06-09 NOTE — PROGRESS NOTES
HPI:    Dorys Owusu is a 27 y.o. [id number] who is here for her 6 week postpartum visit. Pt delivered 5/5/22, twins, PTL at [address] [place] [address]. Babies are doing great and now home. Bottle feeding. Stopped pumping. Denies bleeding but having some discharge and ? UTI symptoms. No sexual activity since delivery. Still having discomfort to right mid-abdominal area    Started on lexapro at last visit for anxiety. No change to symptoms. ROS:      Breast: Denies pain, lump or nipple discharge    GYN: Denies pelvic pain, discharge, itching, odor or dysuria. Constitutional: Negative for chills and fever. HENT: Negative for severe headaches or vision changes    Respiratory: Negative for cough and shortness of breath. Cardiovascular: Negative for chest pain and palpitations. Gastrointestinal: Negative for nausea and vomiting. Negative for diarrhea and constipation    Genitourinary:POS dysuria     Musculoskeletal: Negative for back pain    Skin: Negative for rash and wound. PE:    Constitutional:  well-developed, well-nourished, and in no distress. Mental: Alert and awake. Oriented to person/place/time. Able to follow commands    Eyes: EOM nl, Sclera nl, Ocular Discharge not visualized    HENT: Normocephalic and atraumatic. Mouth/Throat: Mucous membranes are moist. External Ears: nl    Neck: Normal range of motion. Neck supple. No thyromegaly present. Pulmonary: Effort normal; No visualized signs of difficulty breathing or respiratory distress; Abdominal: Soft. There is no tenderness. There is no rebound.  Incision well healed    Pelvic Exam:       External: normal female genitalia without lesions or masses       Vagina: normal without lesions or masses, white discharge noted      Cervix: normal without lesions or masses       Adnexa: normal bimanual exam without masses or fullness       Uterus: uterus is normal size, shape, consistency and nontender    Musculoskeletal: Normal range of motion. Normal gait with no signs of ataxia     Neurological: No Facial Asymmetry (Cranial nerve 7 motor function); No gaze palsy; normal coordination, muscle strength and tone      Skin: Skin is warm and dry. Skin: No significant exanthematous lesions or discoloration noted on facial skin      Psych: Normal affect.  No hallucinations          Past Medical History:   Diagnosis Date    Abnormal genetic test during pregnancy 10/26/2020    G6 - NIPT elevated risk for trisomy 18, missed AB with suction D&C    Anemia     Genital herpes simplex 3/22/2018    Hypertension     gestational BP    Missed ab 2/10/15    Postpartum depression 6/15/2021    Pre-existing hypertension affecting pregnancy in third trimester     Preeclampsia      delivery     Subchorionic bleed 2014    Vulvovaginal rash 3/9/2021       Past Surgical History:   Procedure Laterality Date     SECTION      GYN      OTHER SURGICAL HISTORY      D&C     OTHER SURGICAL HISTORY      D&C     OTHER SURGICAL HISTORY  2022    cerclage       Family History   Problem Relation Age of Onset    Uterine Cancer Neg Hx     Breast Cancer Neg Hx     Other Maternal Grandmother         cervical cancer    Other Mother         cervical cancer    Asthma Father     Diabetes Father     Bleeding Prob Brother         Brain    Diabetes Brother     Other Brother         brain tumor    Other Paternal Grandmother         Lung cancer    Collagen Disease Neg Hx     Colon Cancer Neg Hx     Ovarian Cancer Neg Hx        Social History     Socioeconomic History    Marital status:      Spouse name: Not on file    Number of children: Not on file    Years of education: Not on file    Highest education level: Not on file   Occupational History    Not on file   Tobacco Use    Smoking status: Current Every Day Smoker     Packs/day: 0.25     Last attempt to quit: 2020     Years since quittin.7    Smokeless tobacco: Never Used   Substance and Sexual Activity    Alcohol use: Yes     Comment: occasionally    Drug use: No    Sexual activity: Not Currently     Comment: tubal   Other Topics Concern    Not on file   Social History Narrative    Abuse: Feels safe at home, no history of physical abuse, no history of sexual abuse       Social Determinants of Health     Financial Resource Strain:     Difficulty of Paying Living Expenses: Not on file   Food Insecurity:     Worried About Running Out of Food in the Last Year: Not on file    Samia of Food in the Last Year: Not on file   Transportation Needs:     Lack of Transportation (Medical): Not on file    Lack of Transportation (Non-Medical):  Not on file   Physical Activity:     Days of Exercise per Week: Not on file    Minutes of Exercise per Session: Not on file   Stress:     Feeling of Stress : Not on file   Social Connections:     Frequency of Communication with Friends and Family: Not on file    Frequency of Social Gatherings with Friends and Family: Not on file    Attends Che[employer] Services: Not on file    Active Member of [address] [place] [address] or Organizations: Not on file    Attends Club or Organization Meetings: Not on file    Marital Status: Not on file   Intimate Partner Violence:     Fear of Current or Ex-Partner: Not on file    Emotionally Abused: Not on file    Physically Abused: Not on file    Sexually Abused: Not on file   Housing Stability:     Unable to Pay for Housing in the Last Year: Not on file    Number of [place] in the Last Year: Not on file    Unstable Housing in the Last Year: Not on file           Objective:    Vitals:    06/09/22 1049   BP: 90/60   Weight: 209 lb (94.8 kg)   Height: 5' 7\" (1.702 m)                       Assessment/Plan    Patient Active Problem List    Diagnosis Date Noted    Vaginal discharge 06/09/2022     Priority: Medium     Assessment & Plan Note:     Suspect nl pp latosha mehta collected      Postoperative visit 05/19/2022    History of postpartum depression 06/15/2021     Overview Note:     PLAN:  Close obs of mood throughout preg and PP  5/19/2022: Pt reports no depression symptoms, but high anxiety. Babies in   NICU. Has good family support. Emotional support provided. Declines   counseling but was on lexapro after last child and open to restarting. Rx   sent. F/u 3 weeks      6/9/22: No change to anxiety. increase lexapro to 20 mg daily. Referral sent to  to help patient find counseling options. Denies SI/HI. Emotional support provided.  F/u 4 weeks       Problem List Items Addressed This Visit        Other    Vaginal discharge     Suspect nl pp lochia, nuswab collected         Relevant Orders    Nuswab Vaginitis Plus (VG+)    AMB POC URINALYSIS DIP STICK MANUAL W/O MICRO (Completed)    History of postpartum depression      Other Visit Diagnoses     Pap smear for cervical cancer screening    -  Primary    Relevant Orders    PAP LB, Reflex HPV ASCUS          Orders Placed This Encounter   Procedures    Nuswab Vaginitis Plus (VG+)    PAP LB, Reflex HPV ASCUS    AMB POC URINALYSIS DIP STICK MANUAL W/O MICRO       Outpatient Encounter Medications as of 6/9/2022   Medication Sig Dispense Refill    escitalopram (LEXAPRO) 20 MG tablet Take 1 tablet by mouth daily 30 tablet 12    [DISCONTINUED] escitalopram (LEXAPRO) 10 MG tablet Take 10 mg by mouth daily      [DISCONTINUED] acyclovir (ZOVIRAX) 400 MG tablet Take 400 mg by mouth 3 times daily (Patient not taking: Reported on 6/9/2022)      [DISCONTINUED] calcium carbonate (OS-KYMBERLY) 1250 (500 Ca) MG chewable tablet Take 400 mg by mouth 2 times daily (Patient not taking: Reported on 6/9/2022)      [DISCONTINUED] Cholecalciferol 50 MCG (2000 UT) CAPS Take 2,000 Units by mouth daily (Patient not taking: Reported on 6/9/2022)      [DISCONTINUED] docusate (COLACE, DULCOLAX) 100 MG CAPS Take 100 mg by mouth 2 times daily (Patient not taking: Reported on 6/9/2022)  [DISCONTINUED] famotidine (PEPCID) 20 MG tablet Take 20 mg by mouth every 12 hours (Patient not taking: Reported on 6/9/2022)      [DISCONTINUED] ferrous sulfate (IRON 325) 325 (65 Fe) MG tablet Take 325 mg by mouth 2 times daily (Patient not taking: Reported on 6/9/2022)      [DISCONTINUED] lactulose (CHRONULAC) 10 GM/15ML solution Take 15 mLs by mouth 2 times daily (Patient not taking: Reported on 6/9/2022)      [DISCONTINUED] magnesium oxide (MAG-OX) 400 MG tablet Take 400 mg by mouth 2 times daily (Patient not taking: Reported on 6/9/2022)      [DISCONTINUED] NIFEdipine (PROCARDIA) 20 MG capsule Take 20 mg by mouth every 6 hours as needed (Patient not taking: Reported on 6/9/2022)      [DISCONTINUED] polyethylene glycol (GLYCOLAX) 17 GM/SCOOP powder Take 17 g by mouth every 8 hours (Patient not taking: Reported on 6/9/2022)       No facility-administered encounter medications on file as of 6/9/2022.            Dayan Richardson NP, APRN - CNP 06/09/22 12:16 PM

## 2022-06-09 NOTE — PROGRESS NOTES
I have reviewed the patient's visit today including history, exam and assessment by SHIVA HenaoBC. I agree with treatment/plan as above.     Yg Bradley MD  1:17 PM  06/09/22

## 2022-06-10 ENCOUNTER — FOLLOWUP TELEPHONE ENCOUNTER (OUTPATIENT)
Dept: CASE MANAGEMENT | Age: 27
End: 2022-06-10

## 2022-06-10 NOTE — TELEPHONE ENCOUNTER
SW consult received from OB. Phone call to patient at [phone]. Introduction made; patient states that she will call me back.     SARTHAK Harris, [address] [facility]   [phone]

## 2022-06-13 ENCOUNTER — TELEPHONE (OUTPATIENT)
Dept: OBGYN CLINIC | Age: 27
End: 2022-06-13

## 2022-06-13 LAB
A VAGINAE DNA VAG QL NAA+PROBE: ABNORMAL SCORE
BVAB2 DNA VAG QL NAA+PROBE: ABNORMAL SCORE
C ALBICANS DNA VAG QL NAA+PROBE: NEGATIVE
C GLABRATA DNA VAG QL NAA+PROBE: NEGATIVE
C TRACH RRNA SPEC QL NAA+PROBE: NEGATIVE
MEGA1 DNA VAG QL NAA+PROBE: ABNORMAL SCORE
N GONORRHOEA RRNA SPEC QL NAA+PROBE: NEGATIVE
SPECIMEN SOURCE: ABNORMAL
T VAGINALIS RRNA SPEC QL NAA+PROBE: NEGATIVE

## 2022-06-13 NOTE — TELEPHONE ENCOUNTER
1. Patient tested positive for Bacterial Vaginosis.  If having symptoms may have one of the following for treatment if not allergic    Flagyl 500mg PO BID for 7 days, #14, No Refills   OR    Metrogel apply 1 applicator nightly for 5 nights, #5, No Refills

## 2022-06-14 LAB
CYTOLOGIST CVX/VAG CYTO: NORMAL
CYTOLOGY CVX/VAG DOC THIN PREP: NORMAL
HPV REFLEX: NORMAL
Lab: NORMAL
PATH REPORT.FINAL DX SPEC: NORMAL
STAT OF ADQ CVX/VAG CYTO-IMP: NORMAL

## 2022-06-14 RX ORDER — METRONIDAZOLE 500 MG/1
500 TABLET ORAL 2 TIMES DAILY
Qty: 14 TABLET | Refills: 0 | Status: SHIPPED | OUTPATIENT
Start: 2022-06-14 | End: 2022-06-21

## 2022-06-14 RX ORDER — FLUCONAZOLE 150 MG/1
150 TABLET ORAL
Qty: 2 TABLET | Refills: 0 | Status: SHIPPED | OUTPATIENT
Start: 2022-06-14 | End: 2022-06-18

## 2022-06-14 NOTE — TELEPHONE ENCOUNTER
Below message reviewed with pt. Would like Flagyl PO sent. Requests diflucan just in case of a yeast infection.  Rx sent

## 2022-06-21 ENCOUNTER — FOLLOWUP TELEPHONE ENCOUNTER (OUTPATIENT)
Dept: CASE MANAGEMENT | Age: 27
End: 2022-06-21

## 2022-06-21 NOTE — TELEPHONE ENCOUNTER
Phone call to patient at [phone].       No answer; message left requesting call back.     SARTHAK Sykes, [address] [facility]   [phone]

## 2022-07-01 ENCOUNTER — FOLLOWUP TELEPHONE ENCOUNTER (OUTPATIENT)
Dept: CASE MANAGEMENT | Age: 27
End: 2022-07-01

## 2022-07-01 NOTE — PROGRESS NOTES
Phone call to patient at [phone].      Discussed how patient has been coping since having her twin daughters in May. Per patient, \"My stress level and anxiety has been up. Other than that, I've been fine. \"  Patient states that she's getting \"no rest and constantly on the go. \"  Additionally, when she's able to get rest, she's \"always worried about what's next. \"    Patient is currently on Lexapro, but she has not been able to tell a benefit from this medication. Discussed family's support system, which consists of patient's  and her mother; however, her mother just had knee surgery. SW educated patient on mental health support available thru [facility]'s [facility] ([phone]). Patient agreeable for  to provide contact information for program.  Patient is aware that she is to contact program for additional information. She will then be contacted by a [facility] Coordinator to be enrolled in support services. Information on resources available thru [employer] sent to patient (Chat With An Expert Sessions, Warmline, & Online Support Groups). Patient receptive to starting counseling.   Information provided on: [address] [facility], [facility], [address] [place] [address]   [phone]

## 2022-07-06 ENCOUNTER — FOLLOWUP TELEPHONE ENCOUNTER (OUTPATIENT)
Dept: CASE MANAGEMENT | Age: 27
End: 2022-07-06

## 2022-07-06 NOTE — TELEPHONE ENCOUNTER
Phone call to patient at [phone] to check-in. Patient states that she'll call me back when she gets off of work.     SW will await patient's call back.       SARTHAK Orosco, [address] [facility]   [phone]

## 2022-07-28 ENCOUNTER — FOLLOWUP TELEPHONE ENCOUNTER (OUTPATIENT)
Dept: CASE MANAGEMENT | Age: 27
End: 2022-07-28

## 2022-07-28 NOTE — TELEPHONE ENCOUNTER
Phone call to patient at [phone] to check-in. Patient states that she'll call me back when she gets off of work. SW will await patient's call back.        SARTHAK Balbuena, [address] [facility]   [phone]

## 2022-11-03 RX ORDER — ESCITALOPRAM OXALATE 20 MG/1
20 TABLET ORAL DAILY
Qty: 30 TABLET | Refills: 12 | OUTPATIENT
Start: 2022-11-03

## 2022-11-08 ASSESSMENT — ENCOUNTER SYMPTOMS
ABDOMINAL PAIN: 0
DIARRHEA: 0
COUGH: 0
VOMITING: 0
SHORTNESS OF BREATH: 0
NAUSEA: 0

## 2022-11-08 NOTE — PROGRESS NOTES
[address], DO  [address] [facility], [place] [address], [address]  [phone]        ASSESSMENT AND PLAN    Problem List Items Addressed This Visit          Circulatory    Chronic hypertension     BP elevated today but patient is upset, will recheck in 4 weeks. Other    Encounter to establish care with new doctor - Primary    Current moderate episode of major depressive disorder ([facility] [place] [address]     Patient with history of postpartum depression, reports Lexapro is not helping. States that she is frustrated because she does not feel supported at home. Denies suicidal/homicidal ideation. Will switch to Wellbutrin and will recheck in 4 weeks. Given a list of therapists for patient to call. Relevant Medications    buPROPion (WELLBUTRIN XL) 150 MG extended release tablet    hydrOXYzine HCl (ATARAX) 25 MG tablet    Anxiety     Patient very nervous during exam today with some pressured speech, tearful at times, reports that she cannot shut off her mind at night so she is having trouble sleeping. Possibly some ADHD involved. Will treat with Wellbutrin and hydroxyzine at night. Given a list of therapists for patient to call to set up an appointment. Will recheck in 4 weeks. Consider Psych referral if not improving. Relevant Medications    buPROPion (WELLBUTRIN XL) 150 MG extended release tablet    hydrOXYzine HCl (ATARAX) 25 MG tablet        The diagnoses and plan were discussed with the patient, who verbalizes understanding and agrees with plan. All questions answered. Chief Complaint    Chief Complaint   Patient presents with    Establish Care    Anxiety     Pt states her job mentioned that her anxiety has been getting worse. HISTORY OF PRESENT ILLNESS    27 y.o. female presents today to establish care with a new primary care provider. Referred by [facility] for anxiety. 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   States that her coworkers have noticed that her anxiety has gotten worse over the past few months. Notes that she talks a lot and is impulsive, stating that she will randomly buy things that she regrets later. States that she may need medicine since her Lexapro is not helping with postpartum depression and ADHD. States that she gets easily overwhelmed with her  and kids. Wants to become a nurse some day. States that she has difficulty sleeping because she starts thinking and can't go back to sleep. States that she did not take medicine for ADHD as a child but states that she has always had issues with focusing and concentration. States that she is easily distracted and often starts many things but cannot finish it unless she writes it down, then only sometimes gets things done. Has five children and a  but she does not think he helps much or supports her. States that she tried Ashwagandha to help with stress but it didn't help. States that she was started on Lexapro about six months ago for postpartum depression after delivering her twins but does not think it is helping. Does not want to be labelled but states that she is not doing well and thinks that she needs help.     PAST MEDICAL HISTORY    Past Medical History:   Diagnosis Date    Abnormal genetic test during pregnancy 10/26/2020    G6 - NIPT elevated risk for trisomy 18, missed AB with suction D&C    Anemia     Anxiety     Genital herpes simplex 2018    Hypertension     gestational BP    Missed ab 02/10/2015    Postpartum depression 06/15/2021    Preeclampsia      delivery     Subchorionic bleed 2014    Vulvovaginal rash 2021       PAST SURGICAL HISTORY    Past Surgical History:   Procedure Laterality Date     SECTION      GYN      OTHER SURGICAL HISTORY      D&C     OTHER SURGICAL HISTORY      D&C     OTHER SURGICAL HISTORY  2022    cerclage       FAMILY HISTORY    Family History   Problem Relation Age of Onset    Uterine Cancer Neg Hx     Breast Cancer Neg Hx     Other Maternal Grandmother         cervical cancer    Other Mother         cervical cancer    Asthma Father     Diabetes Father     Bleeding Prob Brother         Brain    Diabetes Brother     Other Brother         brain tumor    Other Paternal Grandmother         Lung cancer    Collagen Disease Neg Hx     Colon Cancer Neg Hx     Ovarian Cancer Neg Hx        SOCIAL HISTORY    Social History     Socioeconomic History    Marital status:      Spouse name: None    Number of children: None    Years of education: None    Highest education level: None   Tobacco Use    Smoking status: Every Day     Packs/day: 0.25     Types: Cigarettes     Last attempt to quit: 2020     Years since quittin.1    Smokeless tobacco: Never   Substance and Sexual Activity    Alcohol use: Yes     Comment: occasionally    Drug use: No    Sexual activity: Not Currently     Comment: tubal   Social History Narrative    Abuse: Feels safe at home, no history of physical abuse, no history of sexual abuse         MEDICATIONS      Current Outpatient Medications:     buPROPion (WELLBUTRIN XL) 150 MG extended release tablet, Take 1 tablet by mouth every morning, Disp: 30 tablet, Rfl: 1    hydrOXYzine HCl (ATARAX) 25 MG tablet, Take 1 tablet by mouth nightly, Disp: 30 tablet, Rfl: 1    ALLERGIES / INTOLERANCES    Allergies   Allergen Reactions    Sulfa Antibiotics Nausea And Vomiting       REVIEW OF SYSTEMS    Review of Systems   Constitutional:  Negative for fever. HENT:  Negative for congestion. Respiratory:  Negative for cough and shortness of breath. Cardiovascular:  Negative for chest pain. Gastrointestinal:  Negative for abdominal pain, diarrhea, nausea and vomiting. Psychiatric/Behavioral:  Positive for decreased concentration, dysphoric mood and sleep disturbance. Negative for self-injury and suicidal ideas. The patient is nervous/anxious. PHYSICAL EXAMINATION    Vitals:    11/10/22 1439   BP: (!) 142/74   Pulse: 90   Resp: 20   SpO2: 99%       Physical Exam  Vitals and nursing note reviewed. Constitutional:       General: She is not in acute distress. Appearance: Normal appearance. HENT:      Head: Normocephalic and atraumatic. Right Ear: External ear normal.      Left Ear: External ear normal.      Nose: Nose normal.   Eyes:      Extraocular Movements: Extraocular movements intact. Pupils: Pupils are equal, round, and reactive to light. Cardiovascular:      Rate and Rhythm: Normal rate and regular rhythm. Heart sounds: Normal heart sounds. No murmur heard. Pulmonary:      Effort: Pulmonary effort is normal. No respiratory distress. Breath sounds: Normal breath sounds. Abdominal:      General: Bowel sounds are normal.      Palpations: Abdomen is soft. Tenderness: There is no abdominal tenderness. There is no right CVA tenderness or left CVA tenderness. Musculoskeletal:         General: Normal range of motion. Cervical back: Normal range of motion. Skin:     General: Skin is warm. Findings: No rash. Neurological:      General: No focal deficit present. Mental Status: She is alert. Psychiatric:         Mood and Affect: Mood is anxious. Affect is tearful. Speech: Speech is rapid and pressured.          Behavior: Behavior normal.         PERTINENT LABS AND IMAGING    Lab Results   Component Value Date     04/20/2022    K 3.4 (L) 04/20/2022     (H) 04/20/2022    CO2 22 04/20/2022    BUN 3 (L) 04/20/2022    CREATININE 0.55 (L) 04/20/2022    GLUCOSE 88 04/20/2022    CALCIUM 8.6 04/20/2022    PROT 6.3 04/20/2022    LABALBU 2.3 (L) 04/20/2022    BILITOT 0.2 04/20/2022    ALKPHOS 327 (H) 04/20/2022    AST 15 04/20/2022    ALT 8 (L) 04/20/2022    GFRAA >60 04/20/2022    AGRATIO 0.6 (L) 04/20/2022    GLOB 4.0 (H) 04/20/2022       Lab Results   Component Value Date    WBC 5.2 04/20/2022    HGB 8.4 (L) 04/20/2022    HCT 25.8 (L) 04/20/2022    MCV 90.8 04/20/2022     (L) 04/20/2022         Truong Lima DO  7:00 PM  11/10/22

## 2022-11-10 ENCOUNTER — OFFICE VISIT (OUTPATIENT)
Dept: PRIMARY CARE CLINIC | Facility: CLINIC | Age: 27
End: 2022-11-10
Payer: MEDICAID

## 2022-11-10 VITALS
SYSTOLIC BLOOD PRESSURE: 142 MMHG | OXYGEN SATURATION: 99 % | DIASTOLIC BLOOD PRESSURE: 74 MMHG | HEART RATE: 90 BPM | HEIGHT: 67 IN | RESPIRATION RATE: 20 BRPM | WEIGHT: 215.6 LBS | BODY MASS INDEX: 33.84 KG/M2

## 2022-11-10 DIAGNOSIS — I10 CHRONIC HYPERTENSION: ICD-10-CM

## 2022-11-10 DIAGNOSIS — F41.9 ANXIETY: ICD-10-CM

## 2022-11-10 DIAGNOSIS — F32.1 CURRENT MODERATE EPISODE OF MAJOR DEPRESSIVE DISORDER, UNSPECIFIED WHETHER RECURRENT (HCC): ICD-10-CM

## 2022-11-10 DIAGNOSIS — Z76.89 ENCOUNTER TO ESTABLISH CARE WITH NEW DOCTOR: Primary | ICD-10-CM

## 2022-11-10 PROCEDURE — 3074F SYST BP LT 130 MM HG: CPT | Performed by: FAMILY MEDICINE

## 2022-11-10 PROCEDURE — 99204 OFFICE O/P NEW MOD 45 MIN: CPT | Performed by: FAMILY MEDICINE

## 2022-11-10 PROCEDURE — 3078F DIAST BP <80 MM HG: CPT | Performed by: FAMILY MEDICINE

## 2022-11-10 RX ORDER — FERROUS FUMARATE 324(106)MG
324 TABLET ORAL
COMMUNITY
End: 2022-11-10 | Stop reason: ALTCHOICE

## 2022-11-10 RX ORDER — HYDROXYZINE HYDROCHLORIDE 25 MG/1
25 TABLET, FILM COATED ORAL NIGHTLY
Qty: 30 TABLET | Refills: 1 | Status: SHIPPED | OUTPATIENT
Start: 2022-11-10 | End: 2022-12-10

## 2022-11-10 RX ORDER — BUPROPION HYDROCHLORIDE 150 MG/1
150 TABLET ORAL EVERY MORNING
Qty: 30 TABLET | Refills: 1 | Status: SHIPPED | OUTPATIENT
Start: 2022-11-10

## 2022-11-10 ASSESSMENT — COLUMBIA-SUICIDE SEVERITY RATING SCALE - C-SSRS
1. WITHIN THE PAST MONTH, HAVE YOU WISHED YOU WERE DEAD OR WISHED YOU COULD GO TO SLEEP AND NOT WAKE UP?: NO
2. HAVE YOU ACTUALLY HAD ANY THOUGHTS OF KILLING YOURSELF?: NO
6. HAVE YOU EVER DONE ANYTHING, STARTED TO DO ANYTHING, OR PREPARED TO DO ANYTHING TO END YOUR LIFE?: NO

## 2022-11-10 ASSESSMENT — PATIENT HEALTH QUESTIONNAIRE - PHQ9
SUM OF ALL RESPONSES TO PHQ QUESTIONS 1-9: 21
8. MOVING OR SPEAKING SO SLOWLY THAT OTHER PEOPLE COULD HAVE NOTICED. OR THE OPPOSITE, BEING SO FIGETY OR RESTLESS THAT YOU HAVE BEEN MOVING AROUND A LOT MORE THAN USUAL: 2
SUM OF ALL RESPONSES TO PHQ QUESTIONS 1-9: 20
7. TROUBLE CONCENTRATING ON THINGS, SUCH AS READING THE NEWSPAPER OR WATCHING TELEVISION: 3
6. FEELING BAD ABOUT YOURSELF - OR THAT YOU ARE A FAILURE OR HAVE LET YOURSELF OR YOUR FAMILY DOWN: 3
9. THOUGHTS THAT YOU WOULD BE BETTER OFF DEAD, OR OF HURTING YOURSELF: 1
2. FEELING DOWN, DEPRESSED OR HOPELESS: 3
SUM OF ALL RESPONSES TO PHQ QUESTIONS 1-9: 21
4. FEELING TIRED OR HAVING LITTLE ENERGY: 3
5. POOR APPETITE OR OVEREATING: 3
SUM OF ALL RESPONSES TO PHQ QUESTIONS 1-9: 21
3. TROUBLE FALLING OR STAYING ASLEEP: 3

## 2022-11-10 ASSESSMENT — ANXIETY QUESTIONNAIRES
IF YOU CHECKED OFF ANY PROBLEMS ON THIS QUESTIONNAIRE, HOW DIFFICULT HAVE THESE PROBLEMS MADE IT FOR YOU TO DO YOUR WORK, TAKE CARE OF THINGS AT HOME, OR GET ALONG WITH OTHER PEOPLE: EXTREMELY DIFFICULT
1. FEELING NERVOUS, ANXIOUS, OR ON EDGE: 3
6. BECOMING EASILY ANNOYED OR IRRITABLE: 3
4. TROUBLE RELAXING: 3
2. NOT BEING ABLE TO STOP OR CONTROL WORRYING: 3
GAD7 TOTAL SCORE: 21
3. WORRYING TOO MUCH ABOUT DIFFERENT THINGS: 3
7. FEELING AFRAID AS IF SOMETHING AWFUL MIGHT HAPPEN: 3
5. BEING SO RESTLESS THAT IT IS HARD TO SIT STILL: 3

## 2022-11-10 NOTE — PATIENT INSTRUCTIONS
IT WAS GREAT TO MEET YOU TODAY! I WANT YOU TO TAKE HALF OF THE LEXAPRO FOR THE NEXT SIX DAYS AND THEN STOP. THEN START THE WELLBUTRIN. TAKE IT ONCE DAILY TO HELP WITH DEPRESSION, IMPULSIVITY AND FOCUS. I AM STARTING YOU ON HYDROXYZINE TO TAKE AT BEDTIME.      YOU MUST HAVE 15 MINUTES A DAY ALONE TO DECOMPRESS. MAKE SURE YOUR  TAKES CARE OF THE KIDS DURING THIS TIME.  YOU CAN TAKE A NAP, GO FOR A WALK, WATCH A SHOW, PUNCH A PILLOW, PLAY A GAME, TAKE A BATH OR WRITE IN A JOURNAL. WE ARE GIVING YOU A LIST OF THERAPISTS/COUNSELORS TO CALL THAT WILL ACCEPT YOUR INSURANCE. CALL ONE OF THEM TO SCHEDULE AN APPOINTMENT. GET A PRETTY PEN AND NOTEBOOK AND WRITE DOWN WHAT YOU HAVE TO DO EACH DAY. THIS WILL HELP YOU FOCUS. I AM GOING TO SEE YOU THE WEEK AFTER THANKSGIVING TO SEE HOW YOU ARE DOING.     SEND ME A 3VR MESSAGE OR CALL ME WITH ANY PROBLEMS [phone]

## 2022-11-10 NOTE — ASSESSMENT & PLAN NOTE
Patient very nervous during exam today with some pressured speech, tearful at times, reports that she cannot shut off her mind at night so she is having trouble sleeping. Possibly some ADHD involved. Will treat with Wellbutrin and hydroxyzine at night. Given a list of therapists for patient to call to set up an appointment. Will recheck in 4 weeks. Consider Psych referral if not improving.

## 2022-11-11 NOTE — ASSESSMENT & PLAN NOTE
Patient with history of postpartum depression, reports Lexapro is not helping. States that she is frustrated because she does not feel supported at home. Denies suicidal/homicidal ideation. Will switch to Wellbutrin and will recheck in 4 weeks. Given a list of therapists for patient to call.

## 2023-01-06 ENCOUNTER — OFFICE VISIT (OUTPATIENT)
Dept: PRIMARY CARE CLINIC | Facility: CLINIC | Age: 28
End: 2023-01-06
Payer: MEDICAID

## 2023-01-06 VITALS
DIASTOLIC BLOOD PRESSURE: 74 MMHG | SYSTOLIC BLOOD PRESSURE: 126 MMHG | BODY MASS INDEX: 35.02 KG/M2 | RESPIRATION RATE: 16 BRPM | HEART RATE: 78 BPM | HEIGHT: 67 IN | OXYGEN SATURATION: 99 % | WEIGHT: 223.1 LBS

## 2023-01-06 DIAGNOSIS — F90.9 ADULT ADHD (ATTENTION DEFICIT HYPERACTIVITY DISORDER): Primary | ICD-10-CM

## 2023-01-06 DIAGNOSIS — F32.1 CURRENT MODERATE EPISODE OF MAJOR DEPRESSIVE DISORDER, UNSPECIFIED WHETHER RECURRENT (HCC): ICD-10-CM

## 2023-01-06 PROCEDURE — 99214 OFFICE O/P EST MOD 30 MIN: CPT | Performed by: FAMILY MEDICINE

## 2023-01-06 PROCEDURE — 3078F DIAST BP <80 MM HG: CPT | Performed by: FAMILY MEDICINE

## 2023-01-06 PROCEDURE — 3074F SYST BP LT 130 MM HG: CPT | Performed by: FAMILY MEDICINE

## 2023-01-06 RX ORDER — DEXTROAMPHETAMINE SACCHARATE, AMPHETAMINE ASPARTATE MONOHYDRATE, DEXTROAMPHETAMINE SULFATE AND AMPHETAMINE SULFATE 2.5; 2.5; 2.5; 2.5 MG/1; MG/1; MG/1; MG/1
10 CAPSULE, EXTENDED RELEASE ORAL DAILY
Qty: 31 CAPSULE | Refills: 0 | Status: SHIPPED | OUTPATIENT
Start: 2023-01-06 | End: 2023-02-06

## 2023-01-06 SDOH — ECONOMIC STABILITY: FOOD INSECURITY: WITHIN THE PAST 12 MONTHS, YOU WORRIED THAT YOUR FOOD WOULD RUN OUT BEFORE YOU GOT MONEY TO BUY MORE.: NEVER TRUE

## 2023-01-06 SDOH — ECONOMIC STABILITY: FOOD INSECURITY: WITHIN THE PAST 12 MONTHS, THE FOOD YOU BOUGHT JUST DIDN'T LAST AND YOU DIDN'T HAVE MONEY TO GET MORE.: NEVER TRUE

## 2023-01-06 ASSESSMENT — ANXIETY QUESTIONNAIRES
5. BEING SO RESTLESS THAT IT IS HARD TO SIT STILL: 0
1. FEELING NERVOUS, ANXIOUS, OR ON EDGE: 0
2. NOT BEING ABLE TO STOP OR CONTROL WORRYING: 0
7. FEELING AFRAID AS IF SOMETHING AWFUL MIGHT HAPPEN: 0
4. TROUBLE RELAXING: 0
GAD7 TOTAL SCORE: 0
6. BECOMING EASILY ANNOYED OR IRRITABLE: 0
3. WORRYING TOO MUCH ABOUT DIFFERENT THINGS: 0

## 2023-01-06 ASSESSMENT — PATIENT HEALTH QUESTIONNAIRE - PHQ9
10. IF YOU CHECKED OFF ANY PROBLEMS, HOW DIFFICULT HAVE THESE PROBLEMS MADE IT FOR YOU TO DO YOUR WORK, TAKE CARE OF THINGS AT HOME, OR GET ALONG WITH OTHER PEOPLE: 0
SUM OF ALL RESPONSES TO PHQ QUESTIONS 1-9: 0
6. FEELING BAD ABOUT YOURSELF - OR THAT YOU ARE A FAILURE OR HAVE LET YOURSELF OR YOUR FAMILY DOWN: 0
SUM OF ALL RESPONSES TO PHQ QUESTIONS 1-9: 0
SUM OF ALL RESPONSES TO PHQ QUESTIONS 1-9: 0
3. TROUBLE FALLING OR STAYING ASLEEP: 0
5. POOR APPETITE OR OVEREATING: 0
SUM OF ALL RESPONSES TO PHQ9 QUESTIONS 1 & 2: 0
4. FEELING TIRED OR HAVING LITTLE ENERGY: 0
7. TROUBLE CONCENTRATING ON THINGS, SUCH AS READING THE NEWSPAPER OR WATCHING TELEVISION: 0
8. MOVING OR SPEAKING SO SLOWLY THAT OTHER PEOPLE COULD HAVE NOTICED. OR THE OPPOSITE, BEING SO FIGETY OR RESTLESS THAT YOU HAVE BEEN MOVING AROUND A LOT MORE THAN USUAL: 0
2. FEELING DOWN, DEPRESSED OR HOPELESS: 0
SUM OF ALL RESPONSES TO PHQ QUESTIONS 1-9: 0
1. LITTLE INTEREST OR PLEASURE IN DOING THINGS: 0
9. THOUGHTS THAT YOU WOULD BE BETTER OFF DEAD, OR OF HURTING YOURSELF: 0

## 2023-01-06 ASSESSMENT — ENCOUNTER SYMPTOMS
VOMITING: 0
ABDOMINAL PAIN: 0
NAUSEA: 0
SHORTNESS OF BREATH: 0
COUGH: 0
DIARRHEA: 0

## 2023-01-06 ASSESSMENT — SOCIAL DETERMINANTS OF HEALTH (SDOH): HOW HARD IS IT FOR YOU TO PAY FOR THE VERY BASICS LIKE FOOD, HOUSING, MEDICAL CARE, AND HEATING?: NOT HARD AT ALL

## 2023-01-06 NOTE — ASSESSMENT & PLAN NOTE
Patient with significant difficulty focusing and very distractible in the office. Is not able to get anything done at home or at work. Slight improvement with Wellbutrin. Discussed options of increasing Wellbutrin vs. Adding a medicine for ADHD. As patient is very distractible, will start on low-dose Adderall XR. Discussed taking every morning with food and will see virtually for follow up in one month.

## 2023-01-06 NOTE — PROGRESS NOTES
[address], DO  [address] [facility], [place] [address], [address]  [phone]         ASSESSMENT AND PLAN    Problem List Items Addressed This Visit          Other    Current moderate episode of major depressive disorder ([address]     Some improvement since starting Wellbutrin, though patient with significant issues concentrating. Considered increasing Wellbutrin dose but will start Adderall first due to significant impairment with concentration and will consider increasing in one month as needed. Given list of therapists who can do virtual appointments. Adult ADHD (attention deficit hyperactivity disorder) - Primary      Patient with significant difficulty focusing and very distractible in the office. Is not able to get anything done at home or at work. Slight improvement with Wellbutrin. Discussed options of increasing Wellbutrin vs. Adding a medicine for ADHD. As patient is very distractible, will start on low-dose Adderall XR. Discussed taking every morning with food and will see virtually for follow up in one month. Relevant Medications    amphetamine-dextroamphetamine (ADDERALL XR) 10 MG extended release capsule        The diagnoses and plan were discussed with the patient, who verbalizes understanding and agrees with plan. All questions answered. Chief Complaint    Chief Complaint   Patient presents with    Discuss Medications     Patient states her anxiety has been getting better but wants to get better at focus. HISTORY OF PRESENT ILLNESS    32 y.o. female with HTN and anxiety presents today for follow up. Last seen two months ago to establish care. Patient was upset so had elevated blood pressure and uncontrolled anxiety and depression. Was switched to Wellbutrin with Hydroxyzine PRN. States that she stopped drinking alcohol, which she used to use to help with her anxiety. Stopped smoking cigarettes as well.   States that she noticed mild improvement when she started the medicine. States that she has not used Hydroxyzine. Has been working a lot more to distract herself and to get out of her house but notes that sometimes she is not able to do what she needs to at work. States that she no longer has a car so her coworkers come pick her up. States that she is having issues with focusing. Is not able to get anything done at home because she has difficulty remembering what she is doing as she is easily distracted. Is not breastfeeding because her breast milk dried up months ago. While in the office, patient tries to look up the name of her previous SSRI and ended up getting distracted by a Facebook friend request.  Allison Delvis her mother during the visit, who says that she has always been easily distracted but she was not on medicine as a child.     PAST MEDICAL HISTORY    Past Medical History:   Diagnosis Date    Abnormal genetic test during pregnancy 10/26/2020    G6 - NIPT elevated risk for trisomy 18, missed AB with suction D&C    Anemia     Anxiety     Genital herpes simplex 2018    Hypertension     gestational BP    Missed ab 02/10/2015    Postpartum depression 06/15/2021    Preeclampsia      delivery     Subchorionic bleed 2014    Vulvovaginal rash 2021       PAST SURGICAL HISTORY    Past Surgical History:   Procedure Laterality Date     SECTION      GYN      OTHER SURGICAL HISTORY      D&C     OTHER SURGICAL HISTORY      D&C     OTHER SURGICAL HISTORY  2022    cerclage       FAMILY HISTORY    Family History   Problem Relation Age of Onset    Uterine Cancer Neg Hx     Breast Cancer Neg Hx     Other Maternal Grandmother         cervical cancer    Other Mother         cervical cancer    Asthma Father     Diabetes Father     Bleeding Prob Brother         Brain    Diabetes Brother     Other Brother         brain tumor    Other Paternal Grandmother         Lung cancer    Collagen Disease Neg Hx     Colon Cancer Neg Hx     Ovarian Cancer Neg Hx        SOCIAL HISTORY    Social History     Socioeconomic History    Marital status:      Spouse name: None    Number of children: None    Years of education: None    Highest education level: None   Tobacco Use    Smoking status: Every Day     Types: E-Cigarettes    Smokeless tobacco: Never   Substance and Sexual Activity    Alcohol use: Not Currently     Comment: occasionally    Drug use: No    Sexual activity: Not Currently     Partners: Female     Comment: tubal   Social History Narrative    Abuse: Feels safe at home, no history of physical abuse, no history of sexual abuse       Social Determinants of Health     Financial Resource Strain: Low Risk     Difficulty of Paying Living Expenses: Not hard at all   Food Insecurity: No Food Insecurity    Worried About Running Out of Food in the Last Year: Never true    Ran Out of Food in the Last Year: Never true       MEDICATIONS    Current Outpatient Medications:     amphetamine-dextroamphetamine (ADDERALL XR) 10 MG extended release capsule, Take 1 capsule by mouth daily for 31 days. Max Daily Amount: 10 mg, Disp: 31 capsule, Rfl: 0    buPROPion (WELLBUTRIN XL) 150 MG extended release tablet, Take 1 tablet by mouth every morning, Disp: 30 tablet, Rfl: 1    ALLERGIES / INTOLERANCES    Allergies   Allergen Reactions    Sulfa Antibiotics Nausea And Vomiting       REVIEW OF SYSTEMS    Review of Systems   Constitutional:  Negative for fever. HENT:  Negative for congestion. Respiratory:  Negative for cough and shortness of breath. Cardiovascular:  Negative for chest pain. Gastrointestinal:  Negative for abdominal pain, diarrhea, nausea and vomiting. Psychiatric/Behavioral:  Positive for decreased concentration, dysphoric mood and sleep disturbance. Negative for self-injury and suicidal ideas. The patient is nervous/anxious.          PHYSICAL EXAMINATION    Vitals:    01/06/23 1105   BP: 126/74 Pulse: 78   Resp: 16   SpO2: 99%         Physical Exam  Vitals and nursing note reviewed. Constitutional:       General: She is not in acute distress. Appearance: Normal appearance. HENT:      Head: Normocephalic and atraumatic. Right Ear: External ear normal.      Left Ear: External ear normal.      Nose: Nose normal.   Eyes:      Extraocular Movements: Extraocular movements intact. Pupils: Pupils are equal, round, and reactive to light. Cardiovascular:      Rate and Rhythm: Normal rate and regular rhythm. Heart sounds: Normal heart sounds. No murmur heard. Pulmonary:      Effort: Pulmonary effort is normal. No respiratory distress. Breath sounds: Normal breath sounds. Abdominal:      General: Bowel sounds are normal.      Palpations: Abdomen is soft. Tenderness: There is no abdominal tenderness. There is no right CVA tenderness or left CVA tenderness. Musculoskeletal:         General: No swelling. Normal range of motion. Cervical back: Normal range of motion. Skin:     General: Skin is warm. Findings: No rash. Neurological:      General: No focal deficit present. Mental Status: She is alert. Psychiatric:         Attention and Perception: She is inattentive.          Mood and Affect: Mood normal.         Speech: Speech is tangential.         Behavior: Behavior normal.         Ej Almodovar, DO  1:11 PM  01/06/23

## 2023-01-06 NOTE — PATIENT INSTRUCTIONS
IT WAS GREAT TO SEE YOU TODAY! DRINK MORE WATER!!    TAKE THE WELLBUTRIN EVERY DAY AND TAKE THE ADDERALL EVERY MORNING WITH FOOD. I WILL SEE YOU AGAIN IN 1 MONTH VIRTUALLY. CALL WITH CONCERNS [phone]    Patient Education        Learning About Attention Deficit Hyperactivity Disorder (ADHD) in Adults  What is ADHD? Attention deficit hyperactivity disorder (ADHD) is a condition in which people have a hard time paying attention. Adults with ADHD also may be more active than normal. They tend to act without thinking. ADHD may make it harder for them to focus, get organized, and finish tasks. ADHD most often starts in childhood and lasts into adulthood. Many adults don't know that they have ADHD until their children are diagnosed. Then they begin to see their own symptoms. Doctors don't know what causes ADHD. But it tends to run in families. What are the symptoms? The most common types of ADHD symptoms in adults are attention problems and hyperactivity. Attention problems  Adults with ADHD often find it hard to:  Finish tasks that don't interest them or aren't easy. But they may become obsessed with activities that they find interesting and enjoy. Keep relationships. Focus their attention on conversations, reading materials, or jobs. They may change jobs a lot. Remember things. They may misplace or lose things. Pay attention. They are easily distracted. They find it hard to focus on one task. Think before they act. They may make quick decisions. They may act before they think about the effect of their actions. Hyperactivity  Adults with ADHD may:  Fidget. They may swing their legs, shift in their seats, or tap their fingers. Move around a lot. They may feel \"revved up\" or on the go. They may not be able to slow down until they are very tired. Find it hard to relax. They may feel restless and find it hard to do quiet things like read or watch TV. How does ADHD affect daily life?   ADHD in adults may affect:  Job performance. They may find it hard to organize their work, manage their time, and focus on one task at a time. They may forget, misplace, or lose things. They may quit their jobs out of boredom. Relationships. Adults with ADHD may find it hard to focus their attention on conversations. It is hard for them to \"read\" the behavior and moods of others and express their own feelings. Temper. They may get easily frustrated. This often can make it harder for them to deal with stress. These adults may overreact and have a short, quick temper. The ability to solve problems. Adults who have a hard time waiting for things they want may act before they think about the effect of their actions. They may take part in risky behaviors. These include unprotected sex, unsafe driving, alcohol and drug use, or unwise business ventures. How is ADHD treated? ADHD can be treated with medicines, behavior training, or counseling. Or it may be a combination of these treatments. Medicines  Stimulant medicines are most often used to treat ADHD. These may include:    Amphetamines. (Examples are Adderall and Dexedrine). Methylphenidate. (Examples are Concerta, Daytrana, Focalin, Metadate, and Ritalin). Other medicines that may be used are:    Atomoxetine. This includes Strattera, a nonstimulant medicine for ADHD. Antihypertensives. These include clonidine (such as Catapres) and guanfacine (such as Tenex). Antidepressants. These include bupropion (Wellbutrin). Behavior training  Behavior training can help adults with ADHD learn how to:    Get organized. A daily organizer or planner can help these adults organize their daily tasks. They can write down appointments and other things they need to remember. Decrease distractions. They can set up their work or home environment so that there are fewer things that will distract them. They may find using headphones or a \"white noise\" machine helpful. College students can arrange a quiet living situation. They may need a single dorm room. Work on relationships. Social skills training can help adults with ADHD relate to family, friends, and coworkers. Couples counseling or family therapy can also help improve relationships. Counseling  Counseling is not meant to treat inattention, hyperactivity, or impulsiveness. But it can help with some of the problems that go along with ADHD. These include not getting along well with others and having problems following rules. Where can you learn more? Go to http://www.woods.com/ and enter Z848 to learn more about \"Learning About Attention Deficit Hyperactivity Disorder (ADHD) in Adults. \"  Current as of: February 9, 2022               Content Version: 13.5  © 2006-2022 [employer]. Care instructions adapted under license by [facility] ([facility]). If you have questions about a medical condition or this instruction, always ask your healthcare professional. [address] any warranty or liability for your use of this information. Urine Pregnancy Test Result: negative Performed By: clinical assistant/nurse Detail Level: None

## 2023-01-06 NOTE — ASSESSMENT & PLAN NOTE
Some improvement since starting Wellbutrin, though patient with significant issues concentrating. Considered increasing Wellbutrin dose but will start Adderall first due to significant impairment with concentration and will consider increasing in one month as needed. Given list of therapists who can do virtual appointments.

## 2023-10-11 NOTE — H&P
[facility] History & Physical    Name: Imani Pete MRN: [id number]  SSN: [id number]    YOB: 1995  Age: 27 y.o. Sex: female      Subjective:     Reason for Admission:  Pregnancy and abdominal cramping, vaginal pressure    History of Present Illness: Ms. Catherine Carroll is a 27 y.o.  G9  female with an estimated gestational age of 18w10d with Estimated Date of Delivery: 22. Patient complains of pelvic cramping and vaginal pressure for the past week on and off. It got much worse today so she came for evaluation. She describes it as cramping pain in the vaginal area and rectum. 6/10 on pain scale. She has been constipated. She denies any VB or LOF. No vaginal discharge or irritation. No other complaints. Pregnancy has been complicated by history of preeclampsia in previous pregnancy, hx of habitual spontaneous , hx placenta abruption with fetal demise at 24 weeks, CHTN, hx IUGR, hx of di/di twins this pregnancy. Patient denies chest pain, fever, headache , nausea and vomiting, right upper quadrant pain  , shortness of breath, swelling, vaginal bleeding , vaginal leaking of fluid , visual disturbances and dysuria. Patient has been seen by [facility] due to above history as well as di/di twins this pregnancy. Patient had ultrasound done 22 with cervical length 38 mm at that time.     PNC with Dr. Ting Ludwig.   +fetal movement per patient        OB History    Para Term  AB Living   9 4 1 3 4 3   SAB IAB Ectopic Molar Multiple Live Births   4 0 0 0 0 3      # Outcome Date GA Lbr Timoeto/2nd Weight Sex Delivery Anes PTL Lv   9 Current            8  21 34w6d 02:04 2.055 kg F Vag-Spont EPI Y ZAYRA      Complications: Fetal Intolerance   7 SAB 20 12w0d             Birth Comments: Trisomy 18   6  19 30w0d   F Vag-Spont  N ZAYRA      Complications: Pre-eclampsia   5 2018 8w0d             Birth Comments: D&C required   4 2016 8w0d             Birth Comments: D&C required   3 SAB 2015 8w0d             Birth Comments: D&C required   2  14 19w0d  0.05 kg U VAGINAL DELI None N FD      Birth Comments: [facility] in pregnancy      Complications: Abruptio Placenta   1 Term 09/09/10 39w0d  2.92 kg M Vag-Spont EPIDURAL AN N ZAYRA      Birth Comments: PreE     Past Medical History:   Diagnosis Date    Abnormal genetic test during pregnancy 10/26/2020    G6 - NIPT elevated risk for trisomy 18, missed AB with suction D&C    Anemia     Genital herpes simplex 3/22/2018    Hypertension     gestational BP    Missed ab 2/10/15    Postpartum depression 6/15/2021    Preeclampsia      delivery     Subchorionic bleed 2014    Vulvovaginal rash 3/9/2021     Has had COVID vaccine, declines booster in pregnancy    Past Surgical History:   Procedure Laterality Date    HX GYN      HX OTHER SURGICAL      D&C     HX OTHER SURGICAL      D&C      Social History     Occupational History    Not on file   Tobacco Use    Smoking status: Former Smoker     Packs/day: 0.25     Years: 0.50     Pack years: 0.12     Quit date: 2020     Years since quittin.3    Smokeless tobacco: Never Used   Vaping Use    Vaping Use: Never used   Substance and Sexual Activity    Alcohol use: No    Drug use: No    Sexual activity: Yes     Partners: Male      Family History   Problem Relation Age of Onset    Asthma Father     Diabetes Father     Bleeding Prob Brother         Brain    Diabetes Brother     Other Brother         brain tumor    Other Paternal Grandmother         Lung cancer    Other Mother         cervical cancer    Other Maternal Grandmother         cervical cancer    Breast Cancer Neg Hx     Uterine Cancer Neg Hx     Ovarian Cancer Neg Hx     Collagen Dis Neg Hx     Colon Cancer Neg Hx        Allergies   Allergen Reactions    Bactrim [Sulfamethoprim Ds] Nausea and Vomiting     Prior to Admission medications Medication Sig Start Date End Date Taking? Authorizing Provider   cholecalciferol (VITAMIN D3) (2,000 UNITS /50 MCG) cap capsule Take 1 Capsule by mouth daily. 22  Yes Apollo Arizmendi MD   calcium carbonate (Mason-Gest Antacid) 200 mg calcium (500 mg) chew Take 2 Tablets by mouth two (2) times a day. Indications: prevention of a low amount of calcium in the blood 22  Yes Apollo Arizmendi MD   food supplemt, lactose-reduced (Ensure) liqd Take 237 mL by mouth four (4) times daily. 22  Yes Joe Griffith NP   aspirin delayed-release 81 mg tablet TAKE 2 TABLETS BY MOUTH EVERY DAY 21  Yes Joe Griffith NP   Prenatal Vitamin Plus Low Iron 27 mg iron- 1 mg tab TAKE 1 TABLET BY MOUTH EVERY DAY 21  Yes Rhona CHOUDHARY NP   sodium chloride (Saline NasaL) 0.65 % nasal squeeze bottle 0.05 mL by Both Nostrils route as needed for Congestion. Indications: stuffy nose  Patient not taking: Reported on 2022   Apollo Arizmendi MD   valACYclovir (VALTREX) 500 mg tablet Take 1 Tab by mouth two (2) times a day. Patient not taking: Reported on 20   Joe Griffith NP        Review of Systems:  A comprehensive review of systems was negative except for that written in the History of Present Illness. Objective:     Vitals:    Vitals:    22 1621 22 1624   BP:  116/66   Pulse:  100   Resp:  20   Temp:  98.1 °F (36.7 °C)   Weight: 100.7 kg (222 lb)    Height: 5' 7\" (1.702 m)       Temp (24hrs), Av.1 °F (36.7 °C), Min:98.1 °F (36.7 °C), Max:98.1 °F (36.7 °C)    BP  Min: 116/66  Max: 116/66     Physical Exam:  Constitutional: The patient appears well, alert, oriented x 3. Cardiovascular: Heart RRR, no murmurs.    Respiratory: Lungs clear, no respiratory distress  GI: Abdomen soft, nontender, no guarding  No fundal tenderness  Musculoskeletal: no cva tenderness  Upper ext: no edema, reflexes +2  Lower ext: no edema, neg karthik's, reflexes +2  Skin: no rashes or lesions  Psychiatric:Mood/ Affect: appropriate  Genitourinary: SSE: cervix visually closed, white creamy discharge in vault  TVUS: cervical length 18-19 mm with funneling spontaneusly and with pressure  FHT:+ in fetus 1 at 140 bpm, + in fetus 2 at 130 bpm by bedside ultrasound  Fetus 1 cephalic, Fetus 2 variable lie  Membranes:  Intact with dividing membrane seen by ultrasound, adequate fluid on both sides  Uterine Activity:  None      Lab/Data Review:  Recent Results (from the past 24 hour(s))   WET PREP    Collection Time: 01/25/22  4:53 PM    Specimen: Vaginal Specimen   Result Value Ref Range    Special Requests: NO SPECIAL REQUESTS      Wet prep FEW  CLUE CELLS PRESENT        Wet prep NO TRICHOMONAS SEEN      Wet prep NO YEAST SEEN      Wet prep NO WBC'S SEEN    POC URINE DIPSTICK MANUAL    Collection Time: 01/25/22  4:59 PM   Result Value Ref Range    Protein (POC) Negative Negative    Glucose, urine (POC) Negative Negative    Ketones (POC) Negative Negative       Assessment and Plan:     Principal Problem:    Short cervix during pregnancy in second trimester (1/25/2022)    Active Problems:    Back pain affecting pregnancy in second trimester (1/25/2022)      Short cervix in second trimester, antepartum (1/25/2022)    Reviewed assessment with Dr. Roseanne Cantu, [facility]. Will observe overnight, initiate indomethacin course 50 mg po q 6 hours, IV placement, T&S, CBC, urine culture and Gc/chlam culture, wet mount ordered. MFM consultation and formal ultrasound with cervical length planned in am.  Reviewed with patient in detail, she expressed understanding. Reviewed with Dr. Concepcion Martinez who is in agreement with plan of care for observation overnight and above MFM consult. Undermining Type: Entire Wound

## 2024-02-06 NOTE — PROGRESS NOTES
Per Rocío Briceno RN, Dr Mike Mireles office called and states that he would like the patient to come to the office at 0830 am and they will schedule her for a D&C since the US showed that the baby had not grown past 12 weeks. 98.1

## 2025-01-13 NOTE — PROGRESS NOTES
Dr. Alejo Perdue at bedside performing sterile speculum exam, abdominal ultrasound, and transvaginal ultrasound. Two distinct heart rates noted via ultrasound. Rx sent. MAT

## (undated) DEVICE — Z DISCONTINUED USE 2659136 CANNULA VAC DIA12MM CRV SEMI RIG W/ ROUNDED TIP TAPR END

## (undated) DEVICE — CYSTO: Brand: MEDLINE INDUSTRIES, INC.

## (undated) DEVICE — PVC URETHRAL CATHETER: Brand: DOVER

## (undated) DEVICE — CONTAINER SPEC HISTOLOGY 900ML POLYPR

## (undated) DEVICE — X-RAY SPONGES,12 PLY: Brand: DERMACEA

## (undated) DEVICE — GOWN,REINF,POLY,ECL,PP SLV,XL: Brand: MEDLINE

## (undated) DEVICE — SOLUTION IV 1000ML 0.9% SOD CHL

## (undated) DEVICE — CARDINAL HEALTH FLEXIBLE LIGHT HANDLE COVER: Brand: CARDINAL HEALTH

## (undated) DEVICE — U.V.A.C. SWIVEL HANDLE W/TUBING, 6': Brand: CONVERTORS

## (undated) DEVICE — DRAPE,UNDERBUTTOCKS,PCH,STERILE: Brand: MEDLINE

## (undated) DEVICE — TRAY PREP DRY W/ PREM GLV 2 APPL 6 SPNG 2 UNDPD 1 OVERWRAP

## (undated) DEVICE — LITHOTOMY: Brand: MEDLINE INDUSTRIES, INC.

## (undated) DEVICE — DRAPE TWL SURG 16X26IN BLU ORB04] ALLCARE INC]

## (undated) DEVICE — Z INACTIVE USE 2527070 DRAPE SURG W40XL44IN UNDERBUTTOCK SMS POLYPR W/ PCH BK DISP

## (undated) DEVICE — PREMIUM WET SKIN PREP TRAY: Brand: MEDLINE INDUSTRIES, INC.

## (undated) DEVICE — STERILE POLYISOPRENE POWDER-FREE SURGICAL GLOVES: Brand: PROTEXIS

## (undated) DEVICE — Z DISCONTINUED USE  2659134 CANNULA VAC DIA10MM CRV SEMI RIG W/ ROUNDED TIP TAPR END

## (undated) DEVICE — SOLUTION IRRIG 1000ML H2O STRL BLT

## (undated) DEVICE — REM POLYHESIVE ADULT PATIENT RETURN ELECTRODE: Brand: VALLEYLAB

## (undated) DEVICE — MINOR LITHOTOMY PACK: Brand: MEDLINE INDUSTRIES, INC.

## (undated) DEVICE — PERI-PAD,MODERATE: Brand: CURITY

## (undated) DEVICE — SUTURE PROL 2 L60IN NONABSORBABLE BLU TP 1 L65MM 1 2 CIR 8825G

## (undated) DEVICE — PAD MATERNITY 11IN W/TAILS -- STRL

## (undated) DEVICE — DRAPE,U/SHT,SPLIT,FILM,60X84,STERILE: Brand: MEDLINE